# Patient Record
Sex: MALE | Race: WHITE | NOT HISPANIC OR LATINO | Employment: PART TIME | URBAN - METROPOLITAN AREA
[De-identification: names, ages, dates, MRNs, and addresses within clinical notes are randomized per-mention and may not be internally consistent; named-entity substitution may affect disease eponyms.]

---

## 2017-07-07 DIAGNOSIS — M1A.0710 IDIOPATHIC CHRONIC GOUT OF RIGHT FOOT WITHOUT TOPHUS: ICD-10-CM

## 2017-07-07 NOTE — LETTER
[facility]  [address] [place] [address]  [place] [address] [phone]          July 12, 2017    Thien Zarate                                                                                                                     [address] [facility] [address]            Dear Thien,    We have tried to contact you, but have not been able to connect with you.    We were calling to let you know that we received a refill request for a medication.    We were able to send in a supply to your pharmacy, but the provider noted that you will need to be seen for a lab appointment in order to continue the medication.    Please call us at [phone] if you have any questions or to schedule an appointment.    Thank you        Sincerely,         Frankie Kimbrough MD

## 2017-07-10 RX ORDER — INDOMETHACIN 25 MG/1
CAPSULE ORAL
Qty: 30 CAPSULE | Refills: 0 | Status: SHIPPED | OUTPATIENT
Start: 2017-07-10 | End: 2019-09-04

## 2017-07-10 NOTE — TELEPHONE ENCOUNTER
Disp Refills Start End HEMALATHA     indomethacin (INDOCIN) 25 MG capsule 30 capsule 0 10/25/2016  No     Sig: TAKE 2 CAPSULES BY MOUTH THREE TIMES DAILY AS NEEDED WITH MEALS     Class: E-Prescribe     Order: [id number]     Last Office Visit with [facility] or [facility] prescribing provider:  9/1/16        Uric Acid   Date Value Ref Range Status   06/16/2015 9.6 (H) 3.5 - 7.2 mg/dL Final   ]  Creatinine   Date Value Ref Range Status   09/01/2016 0.80 0.66 - 1.25 mg/dL Final   ]  Lab Results   Component Value Date    WBC 8.3 06/16/2015     Lab Results   Component Value Date    RBC 4.51 06/16/2015     Lab Results   Component Value Date    HGB 14.1 06/16/2015     Lab Results   Component Value Date    HCT 40.9 06/16/2015     No components found for: MCT  Lab Results   Component Value Date    MCV 91 06/16/2015     Lab Results   Component Value Date    MCH 31.3 06/16/2015     Lab Results   Component Value Date    MCHC 34.5 06/16/2015     Lab Results   Component Value Date    RDW 12.8 06/16/2015     Lab Results   Component Value Date     06/16/2015     Lab Results   Component Value Date    AST 28 02/15/2013     Lab Results   Component Value Date    ALT 47 02/15/2013        Routing refill request to provider for review/approval because:  Labs not current:  Uric Acid, CBC.   Laura Coello RN

## 2017-09-02 ENCOUNTER — TELEPHONE (OUTPATIENT)
Dept: FAMILY MEDICINE | Facility: CLINIC | Age: 57
End: 2017-09-02

## 2017-09-02 DIAGNOSIS — I10 ESSENTIAL HYPERTENSION WITH GOAL BLOOD PRESSURE LESS THAN 140/90: ICD-10-CM

## 2017-09-04 DIAGNOSIS — I10 ESSENTIAL HYPERTENSION WITH GOAL BLOOD PRESSURE LESS THAN 140/90: ICD-10-CM

## 2017-09-05 RX ORDER — AMLODIPINE BESYLATE 10 MG/1
TABLET ORAL
Qty: 90 TABLET | Refills: 0 | Status: SHIPPED | OUTPATIENT
Start: 2017-09-05 | End: 2017-12-04

## 2017-09-05 RX ORDER — METOPROLOL SUCCINATE 50 MG/1
TABLET, EXTENDED RELEASE ORAL
Qty: 270 TABLET | Refills: 0 | Status: SHIPPED | OUTPATIENT
Start: 2017-09-05 | End: 2017-09-14

## 2017-09-05 NOTE — TELEPHONE ENCOUNTER
amLODIPine (NORVASC) 10 MG tablet      Last Written Prescription Date: 9/1/16  Last Fill Quantity: 90, # refills: 3    Last Office Visit with [facility], [facility] or [facility] prescribing provider:  9/1/16   Future Office Visit:        BP Readings from Last 3 Encounters:   09/01/16 138/87   06/16/15 110/70   06/08/15 151/90

## 2017-09-05 NOTE — TELEPHONE ENCOUNTER
Routing refill request to provider for review/approval because:  Patient needs to be seen because it has been more than 1 year since last office visit. Please Advise . Elisha Mandel RN-BSN

## 2017-09-05 NOTE — TELEPHONE ENCOUNTER
metoprolol (TOPROL-XL) 50 MG 24 hr tablet      Last Written Prescription Date: 9/1/16  Last Fill Quantity: 270, # refills: 3    Last Office Visit with [facility], [facility] or [facility] prescribing provider:  9/1/16   Future Office Visit:        BP Readings from Last 3 Encounters:   09/01/16 138/87   06/16/15 110/70   06/08/15 151/90

## 2017-09-05 NOTE — TELEPHONE ENCOUNTER
Medication is being filled for 1 time refill only due to:  Patient needs to be seen because it has been more than one year since last visit.     RN sees another refill encounter for TC to call and set up appt.  Closing this one.    Asiya Mata RN  [facility]

## 2017-09-06 NOTE — TELEPHONE ENCOUNTER
Message left on home number for patient to call back TC line.  NTBS for HTN check with PCP.    Gela PADILLA

## 2017-09-14 ENCOUNTER — OFFICE VISIT (OUTPATIENT)
Dept: FAMILY MEDICINE | Facility: CLINIC | Age: 57
End: 2017-09-14
Payer: COMMERCIAL

## 2017-09-14 VITALS
SYSTOLIC BLOOD PRESSURE: 138 MMHG | TEMPERATURE: 97.4 F | DIASTOLIC BLOOD PRESSURE: 100 MMHG | BODY MASS INDEX: 34.91 KG/M2 | WEIGHT: 272 LBS | HEIGHT: 74 IN | OXYGEN SATURATION: 96 % | HEART RATE: 84 BPM

## 2017-09-14 DIAGNOSIS — E87.6 HYPOKALEMIA: ICD-10-CM

## 2017-09-14 DIAGNOSIS — Z00.01 ENCOUNTER FOR GENERAL ADULT MEDICAL EXAMINATION WITH ABNORMAL FINDINGS: Primary | ICD-10-CM

## 2017-09-14 DIAGNOSIS — I10 ESSENTIAL HYPERTENSION WITH GOAL BLOOD PRESSURE LESS THAN 140/90: ICD-10-CM

## 2017-09-14 DIAGNOSIS — E66.09 NON MORBID OBESITY DUE TO EXCESS CALORIES: ICD-10-CM

## 2017-09-14 DIAGNOSIS — I10 HYPERTENSION GOAL BP (BLOOD PRESSURE) < 140/90: ICD-10-CM

## 2017-09-14 DIAGNOSIS — E78.5 HYPERLIPIDEMIA LDL GOAL <130: ICD-10-CM

## 2017-09-14 DIAGNOSIS — R61 GENERALIZED HYPERHIDROSIS: ICD-10-CM

## 2017-09-14 LAB
BASOPHILS # BLD AUTO: 0 10E9/L (ref 0–0.2)
BASOPHILS NFR BLD AUTO: 0.6 %
DIFFERENTIAL METHOD BLD: NORMAL
EOSINOPHIL # BLD AUTO: 0.1 10E9/L (ref 0–0.7)
EOSINOPHIL NFR BLD AUTO: 2.6 %
ERYTHROCYTE [DISTWIDTH] IN BLOOD BY AUTOMATED COUNT: 12.8 % (ref 10–15)
HCT VFR BLD AUTO: 41.8 % (ref 40–53)
HGB BLD-MCNC: 14.7 G/DL (ref 13.3–17.7)
LYMPHOCYTES # BLD AUTO: 1.8 10E9/L (ref 0.8–5.3)
LYMPHOCYTES NFR BLD AUTO: 33 %
MCH RBC QN AUTO: 32 PG (ref 26.5–33)
MCHC RBC AUTO-ENTMCNC: 35.2 G/DL (ref 31.5–36.5)
MCV RBC AUTO: 91 FL (ref 78–100)
MONOCYTES # BLD AUTO: 0.6 10E9/L (ref 0–1.3)
MONOCYTES NFR BLD AUTO: 11.2 %
NEUTROPHILS # BLD AUTO: 2.9 10E9/L (ref 1.6–8.3)
NEUTROPHILS NFR BLD AUTO: 52.6 %
PLATELET # BLD AUTO: 222 10E9/L (ref 150–450)
RBC # BLD AUTO: 4.6 10E12/L (ref 4.4–5.9)
WBC # BLD AUTO: 5.5 10E9/L (ref 4–11)

## 2017-09-14 PROCEDURE — 99396 PREV VISIT EST AGE 40-64: CPT | Performed by: FAMILY MEDICINE

## 2017-09-14 PROCEDURE — 85025 COMPLETE CBC W/AUTO DIFF WBC: CPT | Performed by: FAMILY MEDICINE

## 2017-09-14 PROCEDURE — 80048 BASIC METABOLIC PNL TOTAL CA: CPT | Performed by: FAMILY MEDICINE

## 2017-09-14 PROCEDURE — 84443 ASSAY THYROID STIM HORMONE: CPT | Performed by: FAMILY MEDICINE

## 2017-09-14 PROCEDURE — 36415 COLL VENOUS BLD VENIPUNCTURE: CPT | Performed by: FAMILY MEDICINE

## 2017-09-14 RX ORDER — METOPROLOL SUCCINATE 100 MG/1
100 TABLET, EXTENDED RELEASE ORAL 2 TIMES DAILY
Qty: 30 TABLET | Refills: 1 | Status: SHIPPED | OUTPATIENT
Start: 2017-09-14 | End: 2018-10-29

## 2017-09-14 RX ORDER — METOPROLOL SUCCINATE 100 MG/1
100 TABLET, EXTENDED RELEASE ORAL DAILY
Qty: 30 TABLET | Refills: 1 | COMMUNITY
Start: 2017-09-14 | End: 2017-09-14

## 2017-09-14 NOTE — MR AVS SNAPSHOT
After Visit Summary   9/14/2017    Thien Zarate    MRN: [id number]           Patient Information     Date Of Birth          1960        Visit Information        Provider Department      9/14/2017 5:20 PM Frankie Kimbrough MD [facility]        Today's Diagnoses     Encounter for general adult medical examination with abnormal findings    -  1    Hypertension goal BP (blood pressure) < 140/90        Hyperlipidemia LDL goal <130        Non morbid obesity due to excess calories        Essential hypertension with goal blood pressure less than 140/90        Generalized hyperhidrosis          Care Instructions      Preventive Health Recommendations  Male Ages 50 - 64    Yearly exam:             See your health care provider every year in order to  o   Review health changes.   o   Discuss preventive care.    o   Review your medicines if your doctor has prescribed any.     Have a cholesterol test every 5 years, or more frequently if you are at risk for high cholesterol/heart disease.     Have a diabetes test (fasting glucose) every three years. If you are at risk for diabetes, you should have this test more often.     Have a colonoscopy at age 50, or have a yearly FIT test (stool test). These exams will check for colon cancer.      Talk with your health care provider about whether or not a prostate cancer screening test (PSA) is right for you.    You should be tested each year for STDs (sexually transmitted diseases), if you re at risk.     Shots: Get a flu shot each year. Get a tetanus shot every 10 years.     Nutrition:    Eat at least 5 servings of fruits and vegetables daily.     Eat whole-grain bread, whole-wheat pasta and brown rice instead of white grains and rice.     Talk to your provider about Calcium and Vitamin D.     Lifestyle    Exercise for at least 150 minutes a week (30 minutes a day, 5 days a week). This will help you control your weight and prevent disease.  "    Limit alcohol to one drink per day.     No smoking.     Wear sunscreen to prevent skin cancer.     See your dentist every six months for an exam and cleaning.     See your eye doctor every 1 to 2 years.            Follow-ups after your visit        Who to contact     If you have questions or need follow up information about today's clinic visit or your schedule please contact [facility] directly at [phone].  Normal or non-critical lab and imaging results will be communicated to you by [employer], letter or phone within 4 business days after the clinic has received the results. If you do not hear from us within 7 days, please contact the clinic through [employer] or phone. If you have a critical or abnormal lab result, we will notify you by phone as soon as possible.  Submit refill requests through [employer] or call your pharmacy and they will forward the refill request to us. Please allow 3 business days for your refill to be completed.          Additional Information About Your Visit        [employer] Information     [employer] gives you secure access to your electronic health record. If you see a primary care provider, you can also send messages to your care team and make appointments. If you have questions, please call your primary care clinic.  If you do not have a primary care provider, please call [phone] and they will assist you.        Care EveryWhere ID     This is your Care EveryWhere ID. This could be used by other organizations to access your [facility] records  [id number]        Your Vitals Were     Pulse Temperature Height Pulse Oximetry BMI (Body Mass Index)       84 97.4  F (36.3  C) (Oral) 6' 1.5\" (1.867 m) 96% 35.4 kg/m2        Blood Pressure from Last 3 Encounters:   09/14/17 (!) 138/100   09/01/16 138/87   06/16/15 110/70    Weight from Last 3 Encounters:   09/14/17 272 lb (123.4 kg)   09/01/16 266 lb (120.7 kg)   06/16/15 279 lb 12.8 oz (126.9 kg)              We " Performed the Following     Basic metabolic panel     CBC with platelets differential     TSH with free T4 reflex          Today's Medication Changes          These changes are accurate as of: 9/14/17  5:51 PM.  If you have any questions, ask your nurse or doctor.               Start taking these medicines.        Dose/Directions    metoprolol 100 MG 24 hr tablet   Commonly known as:  TOPROL-XL   Used for:  Essential hypertension with goal blood pressure less than 140/90   Started by:  Frankie Kimbrough MD        Dose:  100 mg   Take 1 tablet (100 mg) by mouth 2 times daily   Quantity:  30 tablet   Refills:  1            Where to get your medicines      These medications were sent to [id number] [place], [place] [address] [facility] [address] [place] [address]  [address] [facility] [address], [place] [address]     Phone:  [phone]     metoprolol 100 MG 24 hr tablet                Primary Care Provider Office Phone # Fax #    Frankie Kimbrough [phone]       [address] [facility] [address]        Equal Access to Services     [facility]: Hadii aad ku hadasho Soomaali, waaxda luqadaha, qaybta kaalmada adeegyada, maximo day . So [facility] [phone].    ATENCIÓN: Si habla español, tiene a gray disposición servicios gratuitos de asistencia lingüística. Llame al [phone].    We comply with applicable federal civil rights laws and [place] laws. We do not discriminate on the basis of race, color, national origin, age, disability sex, sexual orientation or gender identity.            Thank you!     Thank you for choosing [facility]  for your care. Our goal is always to provide you with excellent care. Hearing back from our patients is one way we can continue to improve our services. Please take a few minutes to complete the written survey that you may receive in the mail after your visit with us. Thank you!              Your Updated Medication List - Protect others around you: Learn how to safely use, store and throw away your medicines at www.disposemymeds.org.          This list is accurate as of: 9/14/17  5:51 PM.  Always use your most recent med list.                   Brand Name Dispense Instructions for use Diagnosis    amLODIPine 10 MG tablet    NORVASC    90 tablet    TAKE 1 TABLET(10 MG) BY MOUTH DAILY    Essential hypertension with goal blood pressure less than 140/90       FISH OIL PO      Take 1,000 mg by mouth 3 TABLETS DAILY        indomethacin 25 MG capsule    INDOCIN    30 capsule    TAKE 2 CAPSULE BY MOUTH TWICE DAILY AS NEEDED WITH MEALS    Idiopathic chronic gout of right foot without tophus       lisinopril-hydrochlorothiazide 20-12.5 MG per tablet    PRINZIDE/ZESTORETIC    180 tablet    Take 2 tablets by mouth daily    Essential hypertension with goal blood pressure less than 140/90       metoprolol 100 MG 24 hr tablet    TOPROL-XL    30 tablet    Take 1 tablet (100 mg) by mouth 2 times daily    Essential hypertension with goal blood pressure less than 140/90       TYLENOL 8 HOUR PO      Take  by mouth. As needed

## 2017-09-14 NOTE — NURSING NOTE
"Chief Complaint   Patient presents with     Physical     Hypertension       Initial BP (!) 142/93  Pulse 84  Temp 97.4  F (36.3  C) (Oral)  Ht 6' 1.5\" (1.867 m)  Wt 272 lb (123.4 kg)  SpO2 96%  BMI 35.4 kg/m2 Estimated body mass index is 35.4 kg/(m^2) as calculated from the following:    Height as of this encounter: 6' 1.5\" (1.867 m).    Weight as of this encounter: 272 lb (123.4 kg).  Medication Reconciliation: complete   Desiree Carolina MA      "

## 2017-09-14 NOTE — PROGRESS NOTES
SUBJECTIVE:   CC: Thien Zarate is an 57 year old male who presents for preventative health visit.     Physical   Annual:     Getting at least 3 servings of Calcium per day::  NO    Bi-annual eye exam::  NO    Dental care twice a year::  NO    Sleep apnea or symptoms of sleep apnea::  None    Taking medications regularly::  Yes    Medication side effects::  Not applicable    Additional concerns today::  No  Hypertension   Taking medications regularly::  Yes  Medication side effects::  Not applicable  Additional concerns today::  No      Blood Pressure follow up    Today's PHQ-2 Score: PHQ-2 ( 1999 [employer]) 9/14/2017   Q1: Little interest or pleasure in doing things 0   Q2: Feeling down, depressed or hopeless 0   PHQ-2 Score 0   Q1: Little interest or pleasure in doing things Not at all   Q2: Feeling down, depressed or hopeless Not at all   PHQ-2 Score 0       Abuse: Current or Past(Physical, Sexual or Emotional)- No  Do you feel safe in your environment - Yes    Social History   Substance Use Topics     Smoking status: Former Smoker     Years: 30.00     Types: Cigars     Quit date: 6/3/1979     Smokeless tobacco: Never Used     Alcohol use Yes      Comment: 12pk a week last use two days ago     The patient does not drink >3 drinks per day nor >7 drinks per week.    Last PSA: No results found for: PSA    Reviewed orders with patient. Reviewed health maintenance and updated orders accordingly - Yes  BP Readings from Last 3 Encounters:   09/14/17 (!) 142/93   09/01/16 138/87   06/16/15 110/70    Wt Readings from Last 3 Encounters:   09/14/17 272 lb (123.4 kg)   09/01/16 266 lb (120.7 kg)   06/16/15 279 lb 12.8 oz (126.9 kg)                  Patient Active Problem List   Diagnosis     Hypertension goal BP (blood pressure) < 140/90     Obesity     Hyperlipidemia LDL goal <130     Advanced directives, counseling/discussion     Past Surgical History:   Procedure Laterality Date     DISCECTOMY LUMBAR POSTERIOR MICROSCOPIC  ONE LEVEL Left 3/24/2015    Procedure: DISCECTOMY LUMBAR POSTERIOR MICROSCOPIC ONE LEVEL;  Surgeon: Cricket Stone MD;  Location: [facility] OR     HC SACROPLASTY      and also laminectomy, unsure what level     Left MMT arthroscopy  2012     ORTHOPEDIC SURGERY  12/2011    Rt knee     ORTHOPEDIC SURGERY  3/9/2012    Lt knee     Rt MMT arthroscopy  2011       Social History   Substance Use Topics     Smoking status: Former Smoker     Years: 30.00     Types: Cigars     Quit date: 6/3/1979     Smokeless tobacco: Never Used     Alcohol use Yes      Comment: 12pk a week last use two days ago     Family History   Problem Relation Age of Onset     Cardiovascular Father      HEART DISEASE Sister          Current Outpatient Prescriptions   Medication Sig Dispense Refill     amLODIPine (NORVASC) 10 MG tablet TAKE 1 TABLET(10 MG) BY MOUTH DAILY 90 tablet 0     metoprolol (TOPROL-XL) 50 MG 24 hr tablet TAKE 3 TABLETS(150 MG) BY MOUTH DAILY 270 tablet 0     indomethacin (INDOCIN) 25 MG capsule TAKE 2 CAPSULE BY MOUTH TWICE DAILY AS NEEDED WITH MEALS 30 capsule 0     lisinopril-hydrochlorothiazide (PRINZIDE,ZESTORETIC) 20-12.5 MG per tablet Take 2 tablets by mouth daily 180 tablet 3     Acetaminophen (TYLENOL 8 HOUR PO) Take  by mouth. As needed       Omega-3 Fatty Acids (FISH OIL PO) Take 1,000 mg by mouth 3 TABLETS DAILY       Allergies   Allergen Reactions     Penicillins Unknown           Reviewed and updated as needed this visit by clinical staff         Reviewed and updated as needed this visit by Provider        Past Medical History:   Diagnosis Date     Arthritis      Gout      Hypertension       Past Surgical History:   Procedure Laterality Date     DISCECTOMY LUMBAR POSTERIOR MICROSCOPIC ONE LEVEL Left 3/24/2015    Procedure: DISCECTOMY LUMBAR POSTERIOR MICROSCOPIC ONE LEVEL;  Surgeon: Cricket Stone MD;  Location: [facility] OR     HC SACROPLASTY      and also laminectomy, unsure what level     Left MMT arthroscopy  2012      "ORTHOPEDIC SURGERY  12/2011    Rt knee     ORTHOPEDIC SURGERY  3/9/2012    Lt knee     Rt MMT arthroscopy  2011       ROS:  C: NEGATIVE for fever, chills, change in weight  I: NEGATIVE for worrisome rashes, moles or lesions  E: NEGATIVE for vision changes or irritation  ENT: NEGATIVE for ear, mouth and throat problems    Gets allergies at this time of the year     R: NEGATIVE for significant cough or SOB    CV: NEGATIVE for chest pain, palpitations or peripheral edema  GI: NEGATIVE for nausea, abdominal pain, heartburn, or change in bowel habits   male: negative for dysuria, hematuria, decreased urinary stream, erectile dysfunction, urethral discharge  M: NEGATIVE for significant arthralgias or myalgia  N: NEGATIVE for weakness, dizziness or paresthesias  P: NEGATIVE for changes in mood or affect    OBJECTIVE:   BP (!) 142/93  Pulse 84  Temp 97.4  F (36.3  C) (Oral)  Ht 6' 1.5\" (1.867 m)  Wt 272 lb (123.4 kg)  SpO2 96%  BMI 35.4 kg/m2    EXAM:  GENERAL: healthy, alert and no distress  EYES: Eyes grossly normal to inspection, PERRL and conjunctivae and sclerae normal  HENT: ear canals and TM's normal, nose and mouth without ulcers or lesions  NECK: no adenopathy, no asymmetry, masses, or scars and thyroid normal to palpation  RESP: lungs clear to auscultation - no rales, rhonchi or wheezes  CV: regular rate and rhythm, normal S1 S2, no S3 or S4, no murmur, click or rub, no peripheral edema and peripheral pulses strong  ABDOMEN: soft, nontender, no hepatosplenomegaly, no masses and bowel sounds normal  MS: no gross musculoskeletal defects noted, no edema  SKIN: no suspicious lesions or rashes  NEURO: Normal strength and tone, mentation intact and speech normal  PSYCH: mentation appears normal, affect normal/bright    ASSESSMENT/PLAN:       ICD-10-CM    1. Encounter for general adult medical examination with abnormal findings Z00.01    2. Hypertension goal BP (blood pressure) < 140/90 I10    3. Hyperlipidemia " "LDL goal <130 E78.5    4. Non morbid obesity due to excess calories E66.09        COUNSELING:   Reviewed preventive health counseling, as reflected in patient instructions       Regular exercise       Healthy diet/nutrition       Vision screening           reports that he quit smoking about 38 years ago. His smoking use included Cigars. He quit after 30.00 years of use. He has never used smokeless tobacco.      Estimated body mass index is 35.09 kg/(m^2) as calculated from the following:    Height as of 9/1/16: 6' 1\" (1.854 m).    Weight as of 9/1/16: 266 lb (120.7 kg).   Weight management plan: Discussed healthy diet and exercise guidelines and patient will follow up in 12 months in clinic to re-evaluate.    Counseling Resources:  ATP IV Guidelines  Pooled Cohorts Equation Calculator  FRAX Risk Assessment  ICSI Preventive Guidelines  Dietary Guidelines for Americans, 2010  USDA's MyPlate  ASA Prophylaxis  Lung CA Screening    Frankie Kimbrough MD  [facility]  "

## 2017-09-15 LAB
ANION GAP SERPL CALCULATED.3IONS-SCNC: 11 MMOL/L (ref 3–14)
BUN SERPL-MCNC: 15 MG/DL (ref 7–30)
CALCIUM SERPL-MCNC: 9.7 MG/DL (ref 8.5–10.1)
CHLORIDE SERPL-SCNC: 104 MMOL/L (ref 94–109)
CO2 SERPL-SCNC: 27 MMOL/L (ref 20–32)
CREAT SERPL-MCNC: 0.66 MG/DL (ref 0.66–1.25)
GFR SERPL CREATININE-BSD FRML MDRD: >90 ML/MIN/1.7M2
GLUCOSE SERPL-MCNC: 101 MG/DL (ref 70–99)
POTASSIUM SERPL-SCNC: 3.3 MMOL/L (ref 3.4–5.3)
SODIUM SERPL-SCNC: 142 MMOL/L (ref 133–144)
TSH SERPL DL<=0.005 MIU/L-ACNC: 2.22 MU/L (ref 0.4–4)

## 2017-09-15 RX ORDER — POTASSIUM CHLORIDE 1500 MG/1
20 TABLET, EXTENDED RELEASE ORAL DAILY
Qty: 30 TABLET | Refills: 0 | Status: SHIPPED | OUTPATIENT
Start: 2017-09-15 | End: 2018-10-29

## 2017-09-15 NOTE — PROGRESS NOTES
Your basic metabolic panel which includes electrolytes,kidney function is normal and  -Glucose (diabetic screening test) is within normal limits.  Your potassium was a little low.  We will nee you to takes some potassium tablets.  I have called this in for you.   Your thyroid is normal.       Follow up in 1 month(s)

## 2017-10-02 DIAGNOSIS — I10 ESSENTIAL HYPERTENSION WITH GOAL BLOOD PRESSURE LESS THAN 140/90: ICD-10-CM

## 2017-10-02 RX ORDER — LISINOPRIL AND HYDROCHLOROTHIAZIDE 12.5; 2 MG/1; MG/1
TABLET ORAL
Qty: 180 TABLET | Refills: 0 | Status: SHIPPED | OUTPATIENT
Start: 2017-10-02 | End: 2018-01-02

## 2017-10-02 NOTE — TELEPHONE ENCOUNTER
lisinopril-hydrochlorothiazide (PRINZIDE,ZESTORETIC) 20-12.5 MG per tablet      Last Written Prescription Date: 9-1-16  Last Fill Quantity: 180, # refills: 3  Last Office Visit with G, P or [facility] prescribing provider: 9-14-17  Next 5 appointments (look out 90 days)     Oct 12, 2017  5:20 PM CDT   SHORT with Frankie Kimbrough MD   [facility]    [address] [place] [id number] [phone]                   Potassium   Date Value Ref Range Status   09/14/2017 3.3 (L) 3.4 - 5.3 mmol/L Final     Creatinine   Date Value Ref Range Status   09/14/2017 0.66 0.66 - 1.25 mg/dL Final     BP Readings from Last 3 Encounters:   09/14/17 (!) 138/100   09/01/16 138/87   06/16/15 110/70

## 2017-10-02 NOTE — TELEPHONE ENCOUNTER
Routing refill request to provider for review/approval because:  Labs out of range  BP was elevated.    Yamile Perez RN [facility] Triage.

## 2017-11-02 ENCOUNTER — OFFICE VISIT (OUTPATIENT)
Dept: FAMILY MEDICINE | Facility: CLINIC | Age: 57
End: 2017-11-02
Payer: COMMERCIAL

## 2017-11-02 VITALS
TEMPERATURE: 97.6 F | WEIGHT: 273 LBS | SYSTOLIC BLOOD PRESSURE: 128 MMHG | HEART RATE: 68 BPM | DIASTOLIC BLOOD PRESSURE: 80 MMHG | BODY MASS INDEX: 35.53 KG/M2 | OXYGEN SATURATION: 97 %

## 2017-11-02 DIAGNOSIS — E66.01 MORBID OBESITY (H): ICD-10-CM

## 2017-11-02 DIAGNOSIS — H00.014 HORDEOLUM EXTERNUM OF LEFT UPPER EYELID: Primary | ICD-10-CM

## 2017-11-02 DIAGNOSIS — I10 HYPERTENSION GOAL BP (BLOOD PRESSURE) < 140/90: ICD-10-CM

## 2017-11-02 PROCEDURE — 99213 OFFICE O/P EST LOW 20 MIN: CPT | Performed by: NURSE PRACTITIONER

## 2017-11-02 RX ORDER — BACITRACIN 500 [USP'U]/G
1 OINTMENT OPHTHALMIC 4 TIMES DAILY
Qty: 3.5 G | Refills: 1 | Status: SHIPPED | OUTPATIENT
Start: 2017-11-02 | End: 2017-11-09

## 2017-11-02 ASSESSMENT — PAIN SCALES - GENERAL: PAINLEVEL: NO PAIN (0)

## 2017-11-02 NOTE — PATIENT INSTRUCTIONS
Sty (or Stye)  A sty is an infection of the oil gland of the eyelid. It may develop into a small pocket of pus (an abscess). This can cause pain, redness, and swelling. In early stages, a sty is treated with antibiotic cream, eye drops, or a small towel soaked in warm water (a warm compress). More severe cases may need to be opened and drained by a healthcare provider.  Home care    Eye drops or ointment are usually prescribed to treat the infection. Use these as directed.     Artificial tears may also be used to lubricate the eye and make it more comfortable. You can buy these over the counter without a prescription. Talk with your healthcare provider before using any over-the-counter treatment for a sty.    Apply a warm, damp towel to the affected eye for at least 5 minutes, 3 to 4 times a day for a week. Warm compresses open the pores and speed the healing. But if the compresses are too hot, they may burn your eyelid.    Sometimes the sty will drain with this treatment alone. If this happens, keep using the antibiotic until all the redness and swelling are gone.    Wash your hands before and after touching the infected eyelid to avoid spreading the infection.    Don t squeeze or try to break open the sty.  Follow-up care  Follow up with your healthcare provider, or as advised.   When to seek medical advice  Call your healthcare provider right away if any of these occur:    Increase in swelling or redness around the eyelid after 48 to 72 hours    Increase in eye pain or the eyelid blisters    Increase in warmth--the eyelid feels hot    Drainage of blood or thick pus from the sty    Blister on the eyelid    Inability to open the eyelid due to swelling    Fever of 100.4 F (38 C) or above, or as directed by your provider    Vision changes    Headache or stiff neck    The sty comes back  Date Last Reviewed: 8/1/2017 2000-2017 [employer]. [address] [place] [address], [place] [address]. All rights  reserved. This information is not intended as a substitute for professional medical care. Always follow your healthcare professional's instructions.

## 2017-11-02 NOTE — PROGRESS NOTES
SUBJECTIVE:   Thien Zarate is a 57 year old male who presents to clinic today for the following health issues:      Eye(s) Problem  Onset: 4 days    Description:   Location: left  Pain: no   Redness: YES    Accompanying Signs & Symptoms:  Discharge/mattering: no  Swelling: YES  Visual changes: no  Fever: no  Nasal Congestion: no  Bothered by bright lights: no    History:   Trauma: no   Foreign body exposure: no    Precipitating factors:   Wearing contacts: no    Alleviating factors:  Improved by: nothing    Therapies Tried and outcome:     Worsening past 4 days  No vision change  No drainage  No fevers    HTN  Compliance with medications    Problem list and histories reviewed & adjusted, as indicated.  Additional history: none    Patient Active Problem List   Diagnosis     Hypertension goal BP (blood pressure) < 140/90     Hyperlipidemia LDL goal <130     Advanced directives, counseling/discussion     Non morbid obesity due to excess calories     Morbid obesity (H)     Past Surgical History:   Procedure Laterality Date     DISCECTOMY LUMBAR POSTERIOR MICROSCOPIC ONE LEVEL Left 3/24/2015    Procedure: DISCECTOMY LUMBAR POSTERIOR MICROSCOPIC ONE LEVEL;  Surgeon: Cricket Stone MD;  Location: [facility]     HC SACROPLASTY      and also laminectomy, unsure what level     Left MMT arthroscopy  2012     ORTHOPEDIC SURGERY  12/2011    Rt knee     ORTHOPEDIC SURGERY  3/9/2012    Lt knee     Rt MMT arthroscopy  2011       Social History   Substance Use Topics     Smoking status: Former Smoker     Years: 30.00     Types: Cigars     Quit date: 6/3/1979     Smokeless tobacco: Never Used     Alcohol use Yes      Comment: 12pk a week last use two days ago     Family History   Problem Relation Age of Onset     Cardiovascular Father      HEART DISEASE Sister          BP Readings from Last 3 Encounters:   11/02/17 128/80   09/14/17 (!) 138/100   09/01/16 138/87    Wt Readings from Last 3 Encounters:   11/02/17 273 lb (123.8 kg)    09/14/17 272 lb (123.4 kg)   09/01/16 266 lb (120.7 kg)                        Reviewed and updated as needed this visit by clinical staffTobacco  Allergies  Meds  Med Hx  Surg Hx  Fam Hx  Soc Hx      Reviewed and updated as needed this visit by Provider         ROS:  Constitutional, HEENT, cardiovascular, pulmonary, gi and gu systems are negative, except as otherwise noted.      OBJECTIVE:   /80  Pulse 68  Temp 97.6  F (36.4  C) (Oral)  Wt 273 lb (123.8 kg)  SpO2 97%  BMI 35.53 kg/m2  Body mass index is 35.53 kg/(m^2).  GENERAL: healthy, alert and no distress  EYES: hordeolum left upper inner eyelid with surrounding erythema and swelling. No warmth. Visual fields intact  CV: regular rate and rhythm, normal S1 S2, no S3 or S4, no murmur, click or rub    Diagnostic Test Results:  none     ASSESSMENT/PLAN:   1. Hordeolum externum of left upper eyelid  - Reviewed self cares and handout given  - bacitracin ophthalmic ointment; Apply 1 Application to eye 4 times daily for 7 days  Dispense: 3.5 g; Refill: 1    2. Hypertension goal BP (blood pressure) < 140/90  - Improved upon recheck. Continue with current medications    3. Morbid obesity (H)  - Advised weight loss and exercise to help with BP control      See Patient Instructions    ABBI Lagunas [facility]

## 2017-11-02 NOTE — NURSING NOTE
"Chief Complaint   Patient presents with     Eye Problem       Initial BP (!) 149/93 (BP Location: Right arm, Patient Position: Chair, Cuff Size: Adult Large)  Pulse 68  Temp 97.6  F (36.4  C) (Oral)  Wt 273 lb (123.8 kg)  SpO2 97%  BMI 35.53 kg/m2 Estimated body mass index is 35.53 kg/(m^2) as calculated from the following:    Height as of 9/14/17: 6' 1.5\" (1.867 m).    Weight as of this encounter: 273 lb (123.8 kg).  Medication Reconciliation: complete.  JOSE Coronado MA      "

## 2017-11-02 NOTE — MR AVS SNAPSHOT
After Visit Summary   11/2/2017    Thien Zarate    MRN: [id number]           Patient Information     Date Of Birth          1960        Visit Information        Provider Department      11/2/2017 9:40 AM Fabiola Caceres APRN [facility]        Today's Diagnoses     Hypertension goal BP (blood pressure) < 140/90    -  1    Hordeolum externum of left upper eyelid          Care Instructions      Sty (or Stye)  A sty is an infection of the oil gland of the eyelid. It may develop into a small pocket of pus (an abscess). This can cause pain, redness, and swelling. In early stages, a sty is treated with antibiotic cream, eye drops, or a small towel soaked in warm water (a warm compress). More severe cases may need to be opened and drained by a healthcare provider.  Home care    Eye drops or ointment are usually prescribed to treat the infection. Use these as directed.     Artificial tears may also be used to lubricate the eye and make it more comfortable. You can buy these over the counter without a prescription. Talk with your healthcare provider before using any over-the-counter treatment for a sty.    Apply a warm, damp towel to the affected eye for at least 5 minutes, 3 to 4 times a day for a week. Warm compresses open the pores and speed the healing. But if the compresses are too hot, they may burn your eyelid.    Sometimes the sty will drain with this treatment alone. If this happens, keep using the antibiotic until all the redness and swelling are gone.    Wash your hands before and after touching the infected eyelid to avoid spreading the infection.    Don t squeeze or try to break open the sty.  Follow-up care  Follow up with your healthcare provider, or as advised.   When to seek medical advice  Call your healthcare provider right away if any of these occur:    Increase in swelling or redness around the eyelid after 48 to 72 hours    Increase in eye pain or  the eyelid blisters    Increase in warmth--the eyelid feels hot    Drainage of blood or thick pus from the sty    Blister on the eyelid    Inability to open the eyelid due to swelling    Fever of 100.4 F (38 C) or above, or as directed by your provider    Vision changes    Headache or stiff neck    The sty comes back  Date Last Reviewed: 8/1/2017 2000-2017 [employer]. [address] [place] [address]. All rights reserved. This information is not intended as a substitute for professional medical care. Always follow your healthcare professional's instructions.                Follow-ups after your visit        Who to contact     If you have questions or need follow up information about today's clinic visit or your schedule please contact [facility] directly at [phone].  Normal or non-critical lab and imaging results will be communicated to you by MyChart, letter or phone within 4 business days after the clinic has received the results. If you do not hear from us within 7 days, please contact the clinic through [employer] or phone. If you have a critical or abnormal lab result, we will notify you by phone as soon as possible.  Submit refill requests through [employer] or call your pharmacy and they will forward the refill request to us. Please allow 3 business days for your refill to be completed.          Additional Information About Your Visit        [employer] Information     [employer] gives you secure access to your electronic health record. If you see a primary care provider, you can also send messages to your care team and make appointments. If you have questions, please call your primary care clinic.  If you do not have a primary care provider, please call [phone] and they will assist you.        Care EveryWhere ID     This is your Care EveryWhere ID. This could be used by other organizations to access your [place] medical records  [id number]        Your Vitals  Were     Pulse Temperature Pulse Oximetry BMI (Body Mass Index)          68 97.6  F (36.4  C) (Oral) 97% 35.53 kg/m2         Blood Pressure from Last 3 Encounters:   11/02/17 128/80   09/14/17 (!) 138/100   09/01/16 138/87    Weight from Last 3 Encounters:   11/02/17 273 lb (123.8 kg)   09/14/17 272 lb (123.4 kg)   09/01/16 266 lb (120.7 kg)              Today, you had the following     No orders found for display         Today's Medication Changes          These changes are accurate as of: 11/2/17 10:12 AM.  If you have any questions, ask your nurse or doctor.               Start taking these medicines.        Dose/Directions    bacitracin ophthalmic ointment   Used for:  Hordeolum externum of left upper eyelid   Started by:  Fabiola Caceres APRN CNP        Dose:  1 Application   Apply 1 Application to eye 4 times daily for 7 days   Quantity:  3.5 g   Refills:  1            Where to get your medicines      These medications were sent to [facility] [address]  [address] [facility] [address]     Phone:  [phone]     bacitracin ophthalmic ointment                Primary Care Provider Office Phone # Fax #    Frankie Kimbrough [phone]       [address] [facility] [address]        Equal Access to Services     ANKITA MARTINEZ AH: Hadii aad ku hadasho Soomaali, waaxda luqadaha, qaybta kaalmada adeegyada, maximo llanos haysaw carrasco. So [facility] [phone].    ATENCIÓN: Si habla español, tiene a gray disposición servicios gratuitos de asistencia lingüística. Llame al [phone].    We comply with applicable federal civil rights laws and [place] laws. We do not discriminate on the basis of race, color, national origin, age, disability, sex, sexual orientation, or gender identity.            Thank you!     Thank you for choosing [facility]  for your care. Our goal is  always to provide you with excellent care. Hearing back from our patients is one way we can continue to improve our services. Please take a few minutes to complete the written survey that you may receive in the mail after your visit with us. Thank you!             Your Updated Medication List - Protect others around you: Learn how to safely use, store and throw away your medicines at www.disposemymeds.org.          This list is accurate as of: 11/2/17 10:12 AM.  Always use your most recent med list.                   Brand Name Dispense Instructions for use Diagnosis    amLODIPine 10 MG tablet    NORVASC    90 tablet    TAKE 1 TABLET(10 MG) BY MOUTH DAILY    Essential hypertension with goal blood pressure less than 140/90       bacitracin ophthalmic ointment     3.5 g    Apply 1 Application to eye 4 times daily for 7 days    Hordeolum externum of left upper eyelid       FISH OIL PO      Take 1,000 mg by mouth 3 TABLETS DAILY        indomethacin 25 MG capsule    INDOCIN    30 capsule    TAKE 2 CAPSULE BY MOUTH TWICE DAILY AS NEEDED WITH MEALS    Idiopathic chronic gout of right foot without tophus       lisinopril-hydrochlorothiazide 20-12.5 MG per tablet    PRINZIDE/ZESTORETIC    180 tablet    TAKE 2 TABLETS BY MOUTH DAILY    Essential hypertension with goal blood pressure less than 140/90       metoprolol 100 MG 24 hr tablet    TOPROL-XL    30 tablet    Take 1 tablet (100 mg) by mouth 2 times daily    Essential hypertension with goal blood pressure less than 140/90       potassium chloride SA 20 MEQ CR tablet    KLOR-CON    30 tablet    Take 1 tablet (20 mEq) by mouth daily    Hypokalemia       TYLENOL 8 HOUR PO      Take  by mouth. As needed

## 2017-11-14 ENCOUNTER — OFFICE VISIT (OUTPATIENT)
Dept: FAMILY MEDICINE | Facility: CLINIC | Age: 57
End: 2017-11-14
Payer: COMMERCIAL

## 2017-11-14 VITALS
SYSTOLIC BLOOD PRESSURE: 144 MMHG | OXYGEN SATURATION: 96 % | HEART RATE: 76 BPM | WEIGHT: 276 LBS | TEMPERATURE: 97.8 F | BODY MASS INDEX: 35.92 KG/M2 | DIASTOLIC BLOOD PRESSURE: 87 MMHG

## 2017-11-14 DIAGNOSIS — I10 HYPERTENSION GOAL BP (BLOOD PRESSURE) < 140/90: Primary | ICD-10-CM

## 2017-11-14 PROCEDURE — 99213 OFFICE O/P EST LOW 20 MIN: CPT | Performed by: FAMILY MEDICINE

## 2017-11-14 RX ORDER — CARVEDILOL 3.12 MG/1
3.12 TABLET ORAL 2 TIMES DAILY WITH MEALS
Qty: 60 TABLET | Refills: 0 | Status: SHIPPED | OUTPATIENT
Start: 2017-11-14 | End: 2017-12-23

## 2017-11-14 NOTE — PATIENT INSTRUCTIONS
We are going to change your metoprolol to coreg    Take 1.5 tablets for three days then   Take 1 tablet for three days then   Take 0.5 tab for three days then stop   The day after the last pill start coreg 3.125 mg two times  a day

## 2017-11-14 NOTE — PROGRESS NOTES
SUBJECTIVE:   Thien Zarate is a 57 year old male who presents to clinic today for the following health issues:    Blood pressure follow up    Complains of wrist pain   Just got a stye that has resolved     O:/87 (BP Location: Right arm, Patient Position: Sitting, Cuff Size: Adult Large)  Pulse 76  Temp 97.8  F (36.6  C) (Oral)  Wt 276 lb (125.2 kg)  SpO2 96%  BMI 35.92 kg/m2      In NAD     Head: Normocephalic, atraumatic.  Eyes: Conjunctiva clear, non icteric. PERRLA.  Ears: External ears and TMs normal BL.  Nose: Septum midline, nasal mucosa pink and moist. No discharge.  Mouth / Throat: Normal dentition.  No oral lesions. Pharynx non erythematous, tonsils without hypertrophy.  Neck: Supple, no enlarged LN, trachea midline.    Chest wall normal to inspection and palpation. Good excursion bilaterally. Lungs clear to auscultation. Good air movement bilaterally without rales, wheezes, or rhonchi.   Regular rate and  rhythm. S1 and S2 normal, no murmurs, clicks, gallops or rubs. No edema or JVD.    Repeat bp was the same       ICD-10-CM    1. Hypertension goal BP (blood pressure) < 140/90 I10 carvedilol (COREG) 3.125 MG tablet     Patient will return in 3 weeks for recheck of bp .   See patient instructions   Plan would be to get his bp up to 25 mg 2 x a day if needed

## 2017-11-14 NOTE — NURSING NOTE
"Chief Complaint   Patient presents with     Hypertension     Follow up       Initial /87 (BP Location: Right arm, Patient Position: Sitting, Cuff Size: Adult Large)  Pulse 76  Temp 97.8  F (36.6  C) (Oral)  Wt 276 lb (125.2 kg)  SpO2 96%  BMI 35.92 kg/m2 Estimated body mass index is 35.92 kg/(m^2) as calculated from the following:    Height as of 9/14/17: 6' 1.5\" (1.867 m).    Weight as of this encounter: 276 lb (125.2 kg).  Medication Reconciliation: complete   Desiree Carolina MA      "

## 2017-12-04 DIAGNOSIS — I10 ESSENTIAL HYPERTENSION WITH GOAL BLOOD PRESSURE LESS THAN 140/90: ICD-10-CM

## 2017-12-05 DIAGNOSIS — I10 ESSENTIAL HYPERTENSION WITH GOAL BLOOD PRESSURE LESS THAN 140/90: ICD-10-CM

## 2017-12-05 RX ORDER — AMLODIPINE BESYLATE 10 MG/1
TABLET ORAL
Qty: 90 TABLET | Refills: 0 | Status: SHIPPED | OUTPATIENT
Start: 2017-12-05 | End: 2018-03-06

## 2017-12-05 RX ORDER — METOPROLOL SUCCINATE 50 MG/1
TABLET, EXTENDED RELEASE ORAL
Qty: 270 TABLET | Refills: 0 | OUTPATIENT
Start: 2017-12-05

## 2017-12-05 RX ORDER — METOPROLOL SUCCINATE 100 MG/1
TABLET, EXTENDED RELEASE ORAL
Qty: 180 TABLET | Refills: 1 | OUTPATIENT
Start: 2017-12-05

## 2017-12-05 NOTE — TELEPHONE ENCOUNTER
Requested Prescriptions   Pending Prescriptions Disp Refills     amLODIPine (NORVASC) 10 MG tablet [Pharmacy Med Name: AMLODIPINE BESYLATE 10MG TABLETS] 90 tablet 0     Sig: TAKE 1 TABLET(10 MG) BY MOUTH DAILY    Calcium Channel Blockers Protocol  Failed    12/4/2017  8:31 AM       Failed - Blood pressure under 140/90    BP Readings from Last 3 Encounters:   11/14/17 144/87   11/02/17 128/80   09/14/17 (!) 138/100                Passed - Recent or future visit with authorizing provider    Patient had office visit in the last year or has a visit in the next 30 days with authorizing provider.  See chart review.              Passed - Patient is age 18 or older       Passed - Normal serum creatinine on file in past 12 months    Recent Labs   Lab Test  09/14/17   1756   CR  0.66             metoprolol (TOPROL-XL) 100 MG 24 hr tablet [Pharmacy Med Name: METOPROLOL ER SUCCINATE 100MG TABS] 180 tablet 1     Sig: TAKE 1 TABLET(100 MG) BY MOUTH TWICE DAILY    Beta-Blockers Protocol Failed    12/4/2017  8:31 AM       Failed - Blood pressure under 140/90    BP Readings from Last 3 Encounters:   11/14/17 144/87   11/02/17 128/80   09/14/17 (!) 138/100                Passed - Patient is age 6 or older       Passed - Recent or future visit with authorizing provider's specialty    Patient had office visit in the last year or has a visit in the next 30 days with authorizing provider.  See chart review.

## 2017-12-05 NOTE — TELEPHONE ENCOUNTER
Per last OV note:    We are going to change your metoprolol to coreg     Take 1.5 tablets for three days then   Take 1 tablet for three days then   Take 0.5 tab for three days then stop   The day after the last pill start coreg 3.125 mg two times  a day     Metoprolol denied.  Amlodipine Prescription approved per [facility] Refill Protocol.    Asiya Mata RN  [facility]

## 2017-12-06 NOTE — TELEPHONE ENCOUNTER
See other encounter.  Patient was switched to Coreg.    Asiya Mata RN  [facility]

## 2017-12-06 NOTE — TELEPHONE ENCOUNTER
Requested Prescriptions   Pending Prescriptions Disp Refills     metoprolol (TOPROL-XL) 50 MG 24 hr tablet [Pharmacy Med Name: METOPROLOL ER SUCCINATE 50MG TABS] 270 tablet 0     Sig: TAKE 3 TABLETS(150 MG) BY MOUTH DAILY    Beta-Blockers Protocol Failed    12/5/2017  3:47 AM       Failed - Blood pressure under 140/90    BP Readings from Last 3 Encounters:   11/14/17 144/87   11/02/17 128/80   09/14/17 (!) 138/100                Passed - Patient is age 6 or older       Passed - Recent or future visit with authorizing provider's specialty    Patient had office visit in the last year or has a visit in the next 30 days with authorizing provider.  See chart review.

## 2017-12-23 DIAGNOSIS — I10 HYPERTENSION GOAL BP (BLOOD PRESSURE) < 140/90: ICD-10-CM

## 2017-12-27 RX ORDER — CARVEDILOL 3.12 MG/1
TABLET ORAL
Qty: 60 TABLET | Refills: 0 | Status: SHIPPED | OUTPATIENT
Start: 2017-12-27 | End: 2017-12-29

## 2017-12-27 NOTE — TELEPHONE ENCOUNTER
Medication is being filled for 1 time refill only due to:  Patient needs to be seen because due for BP check.     Routed to team to assist with setting up needed visit.    Asiya Mata RN  [facility]

## 2017-12-27 NOTE — TELEPHONE ENCOUNTER
Requested Prescriptions   Pending Prescriptions Disp Refills     carvedilol (COREG) 3.125 MG tablet [Pharmacy Med Name: CARVEDILOL 3.125MG TABLETS] 60 tablet 0     Sig: TAKE 1 TABLET BY MOUTH TWICE DAILY WITH MEALS    Beta-Blockers Protocol Failed    12/23/2017 11:41 AM       Failed - Blood pressure under 140/90    BP Readings from Last 3 Encounters:   11/14/17 144/87   11/02/17 128/80   09/14/17 (!) 138/100                Passed - Patient is age 6 or older       Passed - Recent or future visit with authorizing provider's specialty    Patient had office visit in the last year or has a visit in the next 30 days with authorizing provider.  See chart review.

## 2017-12-29 ENCOUNTER — OFFICE VISIT (OUTPATIENT)
Dept: FAMILY MEDICINE | Facility: CLINIC | Age: 57
End: 2017-12-29
Payer: COMMERCIAL

## 2017-12-29 VITALS
HEART RATE: 71 BPM | TEMPERATURE: 97.2 F | SYSTOLIC BLOOD PRESSURE: 128 MMHG | DIASTOLIC BLOOD PRESSURE: 80 MMHG | OXYGEN SATURATION: 97 % | BODY MASS INDEX: 36.31 KG/M2 | WEIGHT: 279 LBS

## 2017-12-29 DIAGNOSIS — I10 HYPERTENSION GOAL BP (BLOOD PRESSURE) < 140/90: Primary | ICD-10-CM

## 2017-12-29 PROCEDURE — 99213 OFFICE O/P EST LOW 20 MIN: CPT | Performed by: FAMILY MEDICINE

## 2017-12-29 RX ORDER — CARVEDILOL 3.12 MG/1
3.12 TABLET ORAL 2 TIMES DAILY WITH MEALS
Qty: 180 TABLET | Refills: 3 | Status: SHIPPED | OUTPATIENT
Start: 2017-12-29 | End: 2018-09-27

## 2017-12-29 NOTE — MR AVS SNAPSHOT
After Visit Summary   12/29/2017    Thien Zarate    MRN: [id number]           Patient Information     Date Of Birth          1960        Visit Information        Provider Department      12/29/2017 3:20 PM Frankie Kimbrough MD [facility]        Today's Diagnoses     Hypertension goal BP (blood pressure) < 140/90    -  1       Follow-ups after your visit        Who to contact     If you have questions or need follow up information about today's clinic visit or your schedule please contact [facility] directly at [phone].  Normal or non-critical lab and imaging results will be communicated to you by [employer], letter or phone within 4 business days after the clinic has received the results. If you do not hear from us within 7 days, please contact the clinic through [employer] or phone. If you have a critical or abnormal lab result, we will notify you by phone as soon as possible.  Submit refill requests through [employer] or call your pharmacy and they will forward the refill request to us. Please allow 3 business days for your refill to be completed.          Additional Information About Your Visit        MyChart Information     [employer] gives you secure access to your electronic health record. If you see a primary care provider, you can also send messages to your care team and make appointments. If you have questions, please call your primary care clinic.  If you do not have a primary care provider, please call [phone] and they will assist you.        Care EveryWhere ID     This is your Care EveryWhere ID. This could be used by other organizations to access your [place] medical records  [id number]        Your Vitals Were     Pulse Temperature Pulse Oximetry BMI (Body Mass Index)          71 97.2  F (36.2  C) (Oral) 97% 36.31 kg/m2         Blood Pressure from Last 3 Encounters:   12/29/17 128/80   11/14/17 144/87   11/02/17 128/80    Weight  from Last 3 Encounters:   12/29/17 279 lb (126.6 kg)   11/14/17 276 lb (125.2 kg)   11/02/17 273 lb (123.8 kg)              Today, you had the following     No orders found for display         Today's Medication Changes          These changes are accurate as of: 12/29/17  3:53 PM.  If you have any questions, ask your nurse or doctor.               These medicines have changed or have updated prescriptions.        Dose/Directions    carvedilol 3.125 MG tablet   Commonly known as:  COREG   This may have changed:  See the new instructions.   Used for:  Hypertension goal BP (blood pressure) < 140/90   Changed by:  Frankie Kimbrough MD        Dose:  3.125 mg   Take 1 tablet (3.125 mg) by mouth 2 times daily (with meals)   Quantity:  180 tablet   Refills:  3            Where to get your medicines      These medications were sent to [facility] [address] [place] [address], [place] [address] AT [place] [address], [facility] [address]     Phone:  [phone]     carvedilol 3.125 MG tablet                Primary Care Provider Office Phone # Fax #    Frankie Kimbrough [phone]       [address] [facility] [address]        Equal Access to Services     JACOB MARTINEZ AH: Hadii amanda ku hadasho Soomaali, waaxda luqadaha, qaybta kaalmada adeegyada, waxay romi haysaw day . So [facility] [phone].    ATENCIÓN: Si habla español, tiene a gray disposición servicios gratuitos de asistencia lingüística. Llame al [phone].    We comply with applicable federal civil rights laws and [place] laws. We do not discriminate on the basis of race, color, national origin, age, disability, sex, sexual orientation, or gender identity.            Thank you!     Thank you for choosing [facility]  for your care. Our goal is always to provide you with excellent care. Hearing back from our patients is one way we can continue to improve our  services. Please take a few minutes to complete the written survey that you may receive in the mail after your visit with us. Thank you!             Your Updated Medication List - Protect others around you: Learn how to safely use, store and throw away your medicines at www.disposemymeds.org.          This list is accurate as of: 12/29/17  3:53 PM.  Always use your most recent med list.                   Brand Name Dispense Instructions for use Diagnosis    amLODIPine 10 MG tablet    NORVASC    90 tablet    TAKE 1 TABLET(10 MG) BY MOUTH DAILY    Essential hypertension with goal blood pressure less than 140/90       carvedilol 3.125 MG tablet    COREG    180 tablet    Take 1 tablet (3.125 mg) by mouth 2 times daily (with meals)    Hypertension goal BP (blood pressure) < 140/90       FISH OIL PO      Take 1,000 mg by mouth 3 TABLETS DAILY        indomethacin 25 MG capsule    INDOCIN    30 capsule    TAKE 2 CAPSULE BY MOUTH TWICE DAILY AS NEEDED WITH MEALS    Idiopathic chronic gout of right foot without tophus       lisinopril-hydrochlorothiazide 20-12.5 MG per tablet    PRINZIDE/ZESTORETIC    180 tablet    TAKE 2 TABLETS BY MOUTH DAILY    Essential hypertension with goal blood pressure less than 140/90       metoprolol 100 MG 24 hr tablet    TOPROL-XL    30 tablet    Take 1 tablet (100 mg) by mouth 2 times daily    Essential hypertension with goal blood pressure less than 140/90       potassium chloride SA 20 MEQ CR tablet    KLOR-CON    30 tablet    Take 1 tablet (20 mEq) by mouth daily    Hypokalemia       TYLENOL 8 HOUR PO      Take  by mouth. As needed

## 2017-12-29 NOTE — PROGRESS NOTES
SUBJECTIVE:   Thien Zarate is a 57 year old male who presents to clinic today for the following health issues:      Medication Followup of coreg    Taking Medication as prescribed: yes    Side Effects:  None    Medication Helping Symptoms:  Unsure     Current Outpatient Prescriptions   Medication Sig Dispense Refill     carvedilol (COREG) 3.125 MG tablet TAKE 1 TABLET BY MOUTH TWICE DAILY WITH MEALS 60 tablet 0     amLODIPine (NORVASC) 10 MG tablet TAKE 1 TABLET(10 MG) BY MOUTH DAILY 90 tablet 0     lisinopril-hydrochlorothiazide (PRINZIDE/ZESTORETIC) 20-12.5 MG per tablet TAKE 2 TABLETS BY MOUTH DAILY 180 tablet 0     indomethacin (INDOCIN) 25 MG capsule TAKE 2 CAPSULE BY MOUTH TWICE DAILY AS NEEDED WITH MEALS 30 capsule 0     Acetaminophen (TYLENOL 8 HOUR PO) Take  by mouth. As needed       Omega-3 Fatty Acids (FISH OIL PO) Take 1,000 mg by mouth 3 TABLETS DAILY       potassium chloride SA (POTASSIUM CHLORIDE) 20 MEQ CR tablet Take 1 tablet (20 mEq) by mouth daily (Patient not taking: Reported on 11/2/2017) 30 tablet 0     metoprolol (TOPROL-XL) 100 MG 24 hr tablet Take 1 tablet (100 mg) by mouth 2 times daily (Patient not taking: Reported on 12/29/2017) 30 tablet 1     No syncope       Ros: no chest pain, chest tightness   No sob   No leg edema   No abdominal pain     Denies fever or chills   Weight stable     O; /80  Pulse 71  Temp 97.2  F (36.2  C) (Oral)  Wt 279 lb (126.6 kg)  SpO2 97%  BMI 36.31 kg/m2    BP Readings from Last 3 Encounters:   12/29/17 139/85   11/14/17 144/87   11/02/17 128/80     Chest wall normal to inspection and palpation. Good excursion bilaterally. Lungs clear to auscultation. Good air movement bilaterally without rales, wheezes, or rhonchi.   Regular rate and  rhythm. S1 and S2 normal, no murmurs, clicks, gallops or rubs. No edema or JVD.      ICD-10-CM    1. Hypertension goal BP (blood pressure) < 140/90 I10 carvedilol (COREG) 3.125 MG tablet       Recheck in 6 months              Reviewed and updated as needed this visit by clinical staffTobacco  Allergies  Meds  Med Hx  Surg Hx  Fam Hx  Soc Hx      Reviewed and updated as needed this visit by Provider

## 2017-12-29 NOTE — NURSING NOTE
"Chief Complaint   Patient presents with     Hypertension     Hyperlipidemia       Initial /85 (BP Location: Right arm, Patient Position: Chair, Cuff Size: Adult Large)  Pulse 71  Temp 97.2  F (36.2  C) (Oral)  Wt 279 lb (126.6 kg)  SpO2 97%  BMI 36.31 kg/m2 Estimated body mass index is 36.31 kg/(m^2) as calculated from the following:    Height as of 9/14/17: 6' 1.5\" (1.867 m).    Weight as of this encounter: 279 lb (126.6 kg).  Medication Reconciliation: complete   Cariirena Redmond MA      "

## 2018-01-02 DIAGNOSIS — I10 ESSENTIAL HYPERTENSION WITH GOAL BLOOD PRESSURE LESS THAN 140/90: ICD-10-CM

## 2018-01-02 NOTE — TELEPHONE ENCOUNTER
Requested Prescriptions   Pending Prescriptions Disp Refills     lisinopril-hydrochlorothiazide (PRINZIDE/ZESTORETIC) 20-12.5 MG per tablet [Pharmacy Med Name: LISINOPRIL-HCTZ 20/12.5MG TABLETS] 180 tablet 0    Last Written Prescription Date:  10-2-17  Last Fill Quantity: 180,  # refills: 0   Last Office Visit with [facility], [facility] or [facility] prescribing provider:  12-29-17   Future Office Visit:      Sig: TAKE 2 TABLETS BY MOUTH DAILY    Diuretics (Including Combos) Protocol Failed    1/2/2018  3:46 AM       Failed - Normal serum potassium on file in past 12 months    Recent Labs   Lab Test  09/14/17   1756   POTASSIUM  3.3*                   Passed - Blood pressure under 140/90    BP Readings from Last 3 Encounters:   12/29/17 128/80   11/14/17 144/87   11/02/17 128/80                Passed - Recent or future visit with authorizing provider's specialty    Patient had office visit in the last year or has a visit in the next 30 days with authorizing provider.  See chart review.              Passed - Patient is age 18 or older       Passed - Normal serum creatinine on file in past 12 months    Recent Labs   Lab Test  09/14/17   1756   CR  0.66             Passed - Normal serum sodium on file in past 12 months    Recent Labs   Lab Test  09/14/17   1756   NA  142

## 2018-01-03 NOTE — TELEPHONE ENCOUNTER
Routing refill request to provider for review/approval because:  Labs out of range  Yamile Perez, JAMEL [facility] Triage.

## 2018-01-04 RX ORDER — LISINOPRIL AND HYDROCHLOROTHIAZIDE 12.5; 2 MG/1; MG/1
TABLET ORAL
Qty: 180 TABLET | Refills: 0 | Status: SHIPPED | OUTPATIENT
Start: 2018-01-04 | End: 2018-04-06

## 2018-03-06 DIAGNOSIS — I10 ESSENTIAL HYPERTENSION WITH GOAL BLOOD PRESSURE LESS THAN 140/90: ICD-10-CM

## 2018-03-06 RX ORDER — AMLODIPINE BESYLATE 10 MG/1
TABLET ORAL
Qty: 90 TABLET | Refills: 0 | Status: SHIPPED | OUTPATIENT
Start: 2018-03-06 | End: 2018-06-07

## 2018-03-06 NOTE — TELEPHONE ENCOUNTER
"Requested Prescriptions   Pending Prescriptions Disp Refills     amLODIPine (NORVASC) 10 MG tablet [Pharmacy Med Name: AMLODIPINE BESYLATE 10MG TABLETS] 90 tablet 0    Last Written Prescription Date:  12/5/17  Last Fill Quantity: 90,  # refills: 0   Last office visit: 12/29/2017 with prescribing provider:     Future Office Visit:     Sig: TAKE 1 TABLET(10 MG) BY MOUTH DAILY    Calcium Channel Blockers Protocol  Passed    3/6/2018 11:38 AM       Passed - Blood pressure under 140/90 in past 12 months    BP Readings from Last 3 Encounters:   12/29/17 128/80   11/14/17 144/87   11/02/17 128/80                Passed - Recent (12 mo) or future (30 days) visit within the authorizing provider's specialty    Patient had office visit in the last year or has a visit in the next 30 days with authorizing provider.  See \"Patient Info\" tab in inbasket, or \"Choose Columns\" in Meds & Orders section of the refill encounter.            Passed - Patient is age 18 or older       Passed - Normal serum creatinine on file in past 12 months    Recent Labs   Lab Test  09/14/17   1756   CR  0.66               "

## 2018-03-06 NOTE — TELEPHONE ENCOUNTER
Prescription approved per [facility] Refill Protocol.  Shazia Barrera,Clinic Rn  [facility] [place]

## 2018-04-06 DIAGNOSIS — I10 ESSENTIAL HYPERTENSION WITH GOAL BLOOD PRESSURE LESS THAN 140/90: ICD-10-CM

## 2018-04-06 RX ORDER — LISINOPRIL AND HYDROCHLOROTHIAZIDE 12.5; 2 MG/1; MG/1
TABLET ORAL
Qty: 180 TABLET | Refills: 0 | Status: SHIPPED | OUTPATIENT
Start: 2018-04-06 | End: 2018-07-07

## 2018-04-06 NOTE — TELEPHONE ENCOUNTER
Routing refill request to provider for review/approval because:  Labs out of range:  See below        Suleiman Richter RN

## 2018-04-06 NOTE — TELEPHONE ENCOUNTER
"Requested Prescriptions   Pending Prescriptions Disp Refills     lisinopril-hydrochlorothiazide (PRINZIDE/ZESTORETIC) 20-12.5 MG per tablet [Pharmacy Med Name: LISINOPRIL-HCTZ 20/12.5MG TABLETS] 180 tablet 0    Last Written Prescription Date:  1/4/18  Last Fill Quantity: 180,  # refills: 0   Last office visit: 12/29/2017 with prescribing provider:     Future Office Visit:   Next 5 appointments (look out 90 days)     Jun 29, 2018  3:20 PM CDT   [facility]    [address] [place] [id number] [phone]                  Sig: TAKE 2 TABLETS BY MOUTH DAILY    Diuretics (Including Combos) Protocol Failed    4/6/2018  3:46 AM       Failed - Normal serum potassium on file in past 12 months    Recent Labs   Lab Test  09/14/17   1756   POTASSIUM  3.3*                   Passed - Blood pressure under 140/90 in past 12 months    BP Readings from Last 3 Encounters:   12/29/17 128/80   11/14/17 144/87   11/02/17 128/80                Passed - Recent (12 mo) or future (30 days) visit within the authorizing provider's specialty    Patient had office visit in the last 12 months or has a visit in the next 30 days with authorizing provider or within the authorizing provider's specialty.  See \"Patient Info\" tab in inbasket, or \"Choose Columns\" in Meds & Orders section of the refill encounter.           Passed - Patient is age 18 or older       Passed - Normal serum creatinine on file in past 12 months    Recent Labs   Lab Test  09/14/17   1756   CR  0.66             Passed - Normal serum sodium on file in past 12 months    Recent Labs   Lab Test  09/14/17   1756   NA  142                "

## 2018-04-13 ENCOUNTER — TRANSFERRED RECORDS (OUTPATIENT)
Dept: HEALTH INFORMATION MANAGEMENT | Facility: CLINIC | Age: 58
End: 2018-04-13

## 2018-04-27 ENCOUNTER — TRANSFERRED RECORDS (OUTPATIENT)
Dept: HEALTH INFORMATION MANAGEMENT | Facility: CLINIC | Age: 58
End: 2018-04-27

## 2018-05-25 ENCOUNTER — TRANSFERRED RECORDS (OUTPATIENT)
Dept: HEALTH INFORMATION MANAGEMENT | Facility: CLINIC | Age: 58
End: 2018-05-25

## 2018-06-07 DIAGNOSIS — I10 ESSENTIAL HYPERTENSION WITH GOAL BLOOD PRESSURE LESS THAN 140/90: ICD-10-CM

## 2018-06-07 RX ORDER — AMLODIPINE BESYLATE 10 MG/1
TABLET ORAL
Qty: 90 TABLET | Refills: 0 | Status: SHIPPED | OUTPATIENT
Start: 2018-06-07 | End: 2018-09-09

## 2018-06-07 NOTE — TELEPHONE ENCOUNTER
"Requested Prescriptions   Pending Prescriptions Disp Refills     amLODIPine (NORVASC) 10 MG tablet [Pharmacy Med Name: AMLODIPINE BESYLATE 10MG TABLETS] 90 tablet 0    Last Written Prescription Date:  3-6-18  Last Fill Quantity: 90,  # refills: 0   Last office visit: 12/29/2017 with prescribing provider:     Future Office Visit:   Next 5 appointments (look out 90 days)     Jun 29, 2018  3:20 PM CDT   SHORT with Frankie Kimbrough MD   [facility]    [address] [place] [id number] [phone]                  Sig: TAKE 1 TABLET(10 MG) BY MOUTH DAILY    Calcium Channel Blockers Protocol  Passed    6/7/2018  3:46 AM       Passed - Blood pressure under 140/90 in past 12 months    BP Readings from Last 3 Encounters:   12/29/17 128/80   11/14/17 144/87   11/02/17 128/80                Passed - Recent (12 mo) or future (30 days) visit within the authorizing provider's specialty    Patient had office visit in the last 12 months or has a visit in the next 30 days with authorizing provider or within the authorizing provider's specialty.  See \"Patient Info\" tab in inbasket, or \"Choose Columns\" in Meds & Orders section of the refill encounter.           Passed - Patient is age 18 or older       Passed - Normal serum creatinine on file in past 12 months    Recent Labs   Lab Test  09/14/17   1756   CR  0.66               "

## 2018-06-07 NOTE — TELEPHONE ENCOUNTER
Medication is being filled for 1 time refill only due to:  Patient needs to be seen because per copy of plan below.   Pt has appt this month, 6-2018.    Recheck in 6 months

## 2018-06-22 ENCOUNTER — TRANSFERRED RECORDS (OUTPATIENT)
Dept: HEALTH INFORMATION MANAGEMENT | Facility: CLINIC | Age: 58
End: 2018-06-22

## 2018-07-07 DIAGNOSIS — I10 ESSENTIAL HYPERTENSION WITH GOAL BLOOD PRESSURE LESS THAN 140/90: ICD-10-CM

## 2018-07-09 NOTE — TELEPHONE ENCOUNTER
"Requested Prescriptions   Pending Prescriptions Disp Refills     lisinopril-hydrochlorothiazide (PRINZIDE/ZESTORETIC) 20-12.5 MG per tablet [Pharmacy Med Name: LISINOPRIL-HCTZ 20/12.5MG TABLETS] 180 tablet 0    Last Written Prescription Date:  4/6/18  Last Fill Quantity: 180,  # refills: 0   Last office visit: 12/29/2017 with prescribing provider:     Future Office Visit:     Sig: TAKE 2 TABLETS BY MOUTH DAILY    Diuretics (Including Combos) Protocol Failed    7/7/2018  3:46 AM       Failed - Normal serum potassium on file in past 12 months    Recent Labs   Lab Test  09/14/17   1756   POTASSIUM  3.3*                   Passed - Blood pressure under 140/90 in past 12 months    BP Readings from Last 3 Encounters:   12/29/17 128/80   11/14/17 144/87   11/02/17 128/80                Passed - Recent (12 mo) or future (30 days) visit within the authorizing provider's specialty    Patient had office visit in the last 12 months or has a visit in the next 30 days with authorizing provider or within the authorizing provider's specialty.  See \"Patient Info\" tab in inbasket, or \"Choose Columns\" in Meds & Orders section of the refill encounter.           Passed - Patient is age 18 or older       Passed - Normal serum creatinine on file in past 12 months    Recent Labs   Lab Test  09/14/17   1756   CR  0.66             Passed - Normal serum sodium on file in past 12 months    Recent Labs   Lab Test  09/14/17   1756   NA  142                "

## 2018-07-10 RX ORDER — LISINOPRIL AND HYDROCHLOROTHIAZIDE 12.5; 2 MG/1; MG/1
TABLET ORAL
Qty: 180 TABLET | Refills: 0 | Status: SHIPPED | OUTPATIENT
Start: 2018-07-10 | End: 2018-10-07

## 2018-07-10 NOTE — TELEPHONE ENCOUNTER
Routing refill request to provider for review/approval because:  Protocol failed d/t potassium level.    Paradise Clemente RN  [facility]

## 2018-07-20 ENCOUNTER — TRANSFERRED RECORDS (OUTPATIENT)
Dept: HEALTH INFORMATION MANAGEMENT | Facility: CLINIC | Age: 58
End: 2018-07-20

## 2018-07-31 ENCOUNTER — TRANSFERRED RECORDS (OUTPATIENT)
Dept: HEALTH INFORMATION MANAGEMENT | Facility: CLINIC | Age: 58
End: 2018-07-31

## 2018-08-03 ENCOUNTER — TRANSFERRED RECORDS (OUTPATIENT)
Dept: HEALTH INFORMATION MANAGEMENT | Facility: CLINIC | Age: 58
End: 2018-08-03

## 2018-08-06 ENCOUNTER — HEALTH MAINTENANCE LETTER (OUTPATIENT)
Age: 58
End: 2018-08-06

## 2018-08-08 ENCOUNTER — TRANSFERRED RECORDS (OUTPATIENT)
Dept: HEALTH INFORMATION MANAGEMENT | Facility: CLINIC | Age: 58
End: 2018-08-08

## 2018-09-09 DIAGNOSIS — I10 ESSENTIAL HYPERTENSION WITH GOAL BLOOD PRESSURE LESS THAN 140/90: ICD-10-CM

## 2018-09-10 NOTE — TELEPHONE ENCOUNTER
"Requested Prescriptions   Pending Prescriptions Disp Refills     amLODIPine (NORVASC) 10 MG tablet [Pharmacy Med Name: AMLODIPINE BESYLATE 10MG TABLETS] 90 tablet 0    Last Written Prescription Date:  6/7/18  Last Fill Quantity: 90,  # refills: 0   Last office visit: 12/29/2017 with prescribing provider:     Future Office Visit:     Sig: TAKE 1 TABLET(10 MG) BY MOUTH DAILY    Calcium Channel Blockers Protocol  Passed    9/9/2018  3:46 AM       Passed - Blood pressure under 140/90 in past 12 months    BP Readings from Last 3 Encounters:   12/29/17 128/80   11/14/17 144/87   11/02/17 128/80                Passed - Recent (12 mo) or future (30 days) visit within the authorizing provider's specialty    Patient had office visit in the last 12 months or has a visit in the next 30 days with authorizing provider or within the authorizing provider's specialty.  See \"Patient Info\" tab in inbasket, or \"Choose Columns\" in Meds & Orders section of the refill encounter.           Passed - Patient is age 18 or older       Passed - Normal serum creatinine on file in past 12 months    Recent Labs   Lab Test  09/14/17   1756   CR  0.66               "

## 2018-09-11 RX ORDER — AMLODIPINE BESYLATE 10 MG/1
TABLET ORAL
Qty: 90 TABLET | Refills: 0 | Status: SHIPPED | OUTPATIENT
Start: 2018-09-11 | End: 2018-12-08

## 2018-09-11 NOTE — TELEPHONE ENCOUNTER
Prescription approved per [facility] Refill Protocol.  Crystal Mccollum RN

## 2018-09-17 ENCOUNTER — TRANSFERRED RECORDS (OUTPATIENT)
Dept: HEALTH INFORMATION MANAGEMENT | Facility: CLINIC | Age: 58
End: 2018-09-17

## 2018-09-27 ENCOUNTER — OFFICE VISIT (OUTPATIENT)
Dept: FAMILY MEDICINE | Facility: CLINIC | Age: 58
End: 2018-09-27
Payer: OTHER MISCELLANEOUS

## 2018-09-27 VITALS
SYSTOLIC BLOOD PRESSURE: 138 MMHG | OXYGEN SATURATION: 97 % | HEART RATE: 75 BPM | DIASTOLIC BLOOD PRESSURE: 95 MMHG | TEMPERATURE: 97.6 F | BODY MASS INDEX: 34.27 KG/M2 | HEIGHT: 74 IN | WEIGHT: 267 LBS

## 2018-09-27 DIAGNOSIS — M25.532 LEFT WRIST PAIN: ICD-10-CM

## 2018-09-27 DIAGNOSIS — Z01.818 PREOP GENERAL PHYSICAL EXAM: Primary | ICD-10-CM

## 2018-09-27 DIAGNOSIS — I10 HYPERTENSION GOAL BP (BLOOD PRESSURE) < 140/90: ICD-10-CM

## 2018-09-27 PROCEDURE — 99214 OFFICE O/P EST MOD 30 MIN: CPT | Performed by: FAMILY MEDICINE

## 2018-09-27 PROCEDURE — 93000 ELECTROCARDIOGRAM COMPLETE: CPT | Performed by: FAMILY MEDICINE

## 2018-09-27 RX ORDER — CARVEDILOL 6.25 MG/1
6.25 TABLET ORAL 2 TIMES DAILY WITH MEALS
Qty: 180 TABLET | Refills: 3 | Status: SHIPPED | OUTPATIENT
Start: 2018-09-27 | End: 2018-10-29

## 2018-09-27 NOTE — Clinical Note
Dr. Bo.  He requires evaluation and anesthesia risk assessment prior to undergoing surgery/procedure for treatment of left wrist .  Fax number for surgical facility: [phone]

## 2018-09-27 NOTE — PROGRESS NOTES
[address] [id number] [phone]  Dept: [phone]    PRE-OP EVALUATION:  Today's date: 2018    Thien Zarate (: 1960) presents for pre-operative evaluation assessment as requested by Dr. Bo.  He requires evaluation and anesthesia risk assessment prior to undergoing surgery/procedure for treatment of left wrist .    Fax number for surgical facility: [phone]  Primary Physician: Frankie Kimbrough  Type of Anesthesia Anticipated: to be determined    Patient has a Health Care Directive or Living Will:  YES    Preop Questions 2018   Who is doing your surgery? dr bo [facility]   What are you having done? left wrist surgery   Date of Surgery/Procedure:    Facility or Hospital where procedure/surgery will be performed: [facility]   1.  Do you have a history of Heart attack, stroke, stent, coronary bypass surgery, or other heart surgery? No   2.  Do you ever have any pain or discomfort in your chest? No   3.  Do you have a history of  Heart Failure? No   4.   Are you troubled by shortness of breath when:  walking on a level surface, or up a slight hill, or at night? No   5.  Do you currently have a cold, bronchitis or other respiratory infection? No   6.  Do you have a cough, shortness of breath, or wheezing? No   7.  Do you sometimes get pains in the calves of your legs when you walk? No   8. Do you or anyone in your family have previous history of blood clots? UNKNOWN   9.  Do you or does anyone in your family have a serious bleeding problem such as prolonged bleeding following surgeries or cuts? No   10. Have you ever had problems with anemia or been told to take iron pills? No   11. Have you had any abnormal blood loss such as black, tarry or bloody stools? No   12. Have you ever had a blood transfusion? UNKNOWN   13. Have you or any of your relatives ever had  problems with anesthesia? No   14. Do you have sleep apnea, excessive snoring or daytime drowsiness? No   15. Do you have any prosthetic heart valves? No   16. Do you have prosthetic joints? No         HPI:     HPI related to upcoming procedure: Pain in the left wrist about 7 months ago.  He was grabbing frozen garbage out of carts.  He has to snap them out.  He did feel a little twinge.  He noticed that it started to swell by the end of the day.  He was told he had some bone spurs and carpal tunnel syndrome.  He failed injections and physical therapy   He is currently using K/T tape.  No numbness in the hand.  MRI and emg's done at [facility].    Past Medical History:   Diagnosis Date     Arthritis      Gout      Hypertension        MEDICAL HISTORY:     Patient Active Problem List    Diagnosis Date Noted     Morbid obesity (H) 11/02/2017     Priority: Medium     Non morbid obesity due to excess calories 09/14/2017     Priority: Medium     Advanced directives, counseling/discussion 09/02/2016     Priority: Medium     Advance Care Planning 9/2/2016: ACP Review of Chart / Resources Provided:  Reviewed chart for advance care plan.  Thien Zarate has no plan or code status on file. Discussed available resources and provided with information. Confirmed code status reflects current choices pending further ACP discussions.  Confirmed/documented legally designated decision makers.  Added by Yessenia Diez             Hyperlipidemia LDL goal <130 06/04/2013     Priority: Medium     Hypertension goal BP (blood pressure) < 140/90 08/25/2011     Priority: Medium      Past Medical History:   Diagnosis Date     Arthritis      Gout      Hypertension      Past Surgical History:   Procedure Laterality Date     DISCECTOMY LUMBAR POSTERIOR MICROSCOPIC ONE LEVEL Left 3/24/2015    Procedure: DISCECTOMY LUMBAR POSTERIOR MICROSCOPIC ONE LEVEL;  Surgeon: Cricket Stone MD;  Location:  OR      SACROPLASTY      and also  laminectomy, unsure what level     Left MMT arthroscopy  2012     ORTHOPEDIC SURGERY  12/2011    Rt knee     ORTHOPEDIC SURGERY  3/9/2012    Lt knee     Rt MMT arthroscopy  2011     Current Outpatient Prescriptions   Medication Sig Dispense Refill     Acetaminophen (TYLENOL 8 HOUR PO) Take  by mouth. As needed       amLODIPine (NORVASC) 10 MG tablet TAKE 1 TABLET(10 MG) BY MOUTH DAILY 90 tablet 0     carvedilol (COREG) 3.125 MG tablet Take 1 tablet (3.125 mg) by mouth 2 times daily (with meals) 180 tablet 3     indomethacin (INDOCIN) 25 MG capsule TAKE 2 CAPSULE BY MOUTH TWICE DAILY AS NEEDED WITH MEALS 30 capsule 0     lisinopril-hydrochlorothiazide (PRINZIDE/ZESTORETIC) 20-12.5 MG per tablet TAKE 2 TABLETS BY MOUTH DAILY 180 tablet 0     Omega-3 Fatty Acids (FISH OIL PO) Take 1,000 mg by mouth 3 TABLETS DAILY       metoprolol (TOPROL-XL) 100 MG 24 hr tablet Take 1 tablet (100 mg) by mouth 2 times daily (Patient not taking: Reported on 12/29/2017) 30 tablet 1     potassium chloride SA (POTASSIUM CHLORIDE) 20 MEQ CR tablet Take 1 tablet (20 mEq) by mouth daily (Patient not taking: Reported on 11/2/2017) 30 tablet 0     OTC products: NSAIDS    Allergies   Allergen Reactions     Penicillins Unknown      Latex Allergy: NO    Social History   Substance Use Topics     Smoking status: Former Smoker     Years: 30.00     Types: Cigars     Quit date: 6/3/1979     Smokeless tobacco: Never Used     Alcohol use Yes     History   Drug Use No       REVIEW OF SYSTEMS:   CONSTITUTIONAL: NEGATIVE for fever, chills, change in weight  INTEGUMENTARY/SKIN: NEGATIVE for worrisome rashes, moles or lesions  EYES: NEGATIVE for vision changes or irritation  ENT/MOUTH: NEGATIVE for ear, mouth and throat problems  RESP: NEGATIVE for significant cough or SOB  BREAST: NEGATIVE for masses, tenderness or discharge  CV: NEGATIVE for chest pain, palpitations or peripheral edema  GI: NEGATIVE for nausea, abdominal pain, heartburn, or change in bowel  "habits  : NEGATIVE for frequency, dysuria, or hematuria  MUSCULOSKELETAL: NEGATIVE for significant arthralgias or myalgia  NEURO: NEGATIVE for weakness, dizziness or paresthesias  ENDOCRINE: NEGATIVE for temperature intolerance, skin/hair changes  HEME: NEGATIVE for bleeding problems  PSYCHIATRIC: NEGATIVE for changes in mood or affect    EXAM:   BP (!) 138/95 (BP Location: Right arm, Patient Position: Chair, Cuff Size: Adult Large)  Pulse 75  Temp 97.6  F (36.4  C) (Oral)  Ht 6' 2\" (1.88 m)  Wt 267 lb (121.1 kg)  SpO2 97%  BMI 34.28 kg/m2    GENERAL APPEARANCE: healthy, alert and no distress     EYES: EOMI,  PERRL     HENT: ear canals and TM's normal and nose and mouth without ulcers or lesions     NECK: no adenopathy, no asymmetry, masses, or scars and thyroid normal to palpation     RESP: lungs clear to auscultation - no rales, rhonchi or wheezes     CV: regular rates and rhythm, normal S1 S2, no S3 or S4 and no murmur, click or rub     ABDOMEN:  soft, nontender, no HSM or masses and bowel sounds normal     MS: extremities normal- no gross deformities noted, no evidence of inflammation in joints, FROM in all extremities.     SKIN: no suspicious lesions or rashes     NEURO: Normal strength and tone, sensory exam grossly normal, mentation intact and speech normal     PSYCH: mentation appears normal. and affect normal/bright     LYMPHATICS: No cervical adenopathy    DIAGNOSTICS:   EKG: Not indicated due to non-vascular surgery and low risk of event (age <65 and without cardiac risk factors)    Recent Labs   Lab Test  09/14/17   1756  09/01/16   1607  06/16/15   1530   03/06/12   1825   HGB  14.7   --   14.1   < >  14.1   PLT  222   --   229   < >  186   NA  142  137   --    < >  142   POTASSIUM  3.3*  3.4   --    < >  3.8   CR  0.66  0.80   --    < >  0.79   A1C   --    --    --    --   5.3    < > = values in this interval not displayed.        IMPRESSION:   Reason for surgery/procedure: left wrist pain "   Diagnosis/reason for consult: tendonitis, carpal tunnel and bone spurs of the left wrist     The proposed surgical procedure is considered LOW risk.    REVISED CARDIAC RISK INDEX    No serious cardiac risks  INTERPRETATION: 0 risks: Class I (very low risk - 0.4% complication rate)    The patient has the following additional risks for perioperative complications:  No identified additional risks      ICD-10-CM    1. Preop general physical exam Z01.818    2. Hypertension goal BP (blood pressure) < 140/90 I10 carvedilol (COREG) 6.25 MG tablet     EKG 12-lead complete w/read - Clinics   3. Left wrist pain M25.532        RECOMMENDATIONS:       --Patient is to take all scheduled medications on the day of surgery EXCEPT for modifications listed below.    Anticoagulant or Antiplatelet Medication Use  NSAIDS: Ibuprofen (Motrin):         Stop one day prior to surgery        APPROVAL GIVEN to proceed with proposed procedure, without further diagnostic evaluation       Signed Electronically by: Frankie Kimbrough MD    Copy of this evaluation report is provided to requesting physician.    [facility] Preop Guidelines    Revised Cardiac Risk Index

## 2018-09-27 NOTE — MR AVS SNAPSHOT
After Visit Summary   9/27/2018    Thien Zarate    MRN: [id number]           Patient Information     Date Of Birth          1960        Visit Information        Provider Department      9/27/2018 9:00 AM Frankie Kimbrough MD [facility]        Today's Diagnoses     Preop general physical exam    -  1    Hypertension goal BP (blood pressure) < 140/90          Care Instructions      Before Your Surgery      Call your surgeon if there is any change in your health. This includes signs of a cold or flu (such as a sore throat, runny nose, cough, rash or fever).    Do not smoke, drink alcohol or take over the counter medicine (unless your surgeon or primary care doctor tells you to) for the 24 hours before and after surgery.    If you take prescribed drugs: Follow your doctor s orders about which medicines to take and which to stop until after surgery.    Eating and drinking prior to surgery: follow the instructions from your surgeon    Take a shower or bath the night before surgery. Use the soap your surgeon gave you to gently clean your skin. If you do not have soap from your surgeon, use your regular soap. Do not shave or scrub the surgery site.  Wear clean pajamas and have clean sheets on your bed.           Follow-ups after your visit        Who to contact     If you have questions or need follow up information about today's clinic visit or your schedule please contact [facility] directly at [phone].  Normal or non-critical lab and imaging results will be communicated to you by MyChart, letter or phone within 4 business days after the clinic has received the results. If you do not hear from us within 7 days, please contact the clinic through MyChart or phone. If you have a critical or abnormal lab result, we will notify you by phone as soon as possible.  Submit refill requests through Liquid Spins or call your pharmacy and they will forward the  "refill request to us. Please allow 3 business days for your refill to be completed.          Additional Information About Your Visit        KryptiqharMy eStore App Information     Outbrain gives you secure access to your electronic health record. If you see a primary care provider, you can also send messages to your care team and make appointments. If you have questions, please call your primary care clinic.  If you do not have a primary care provider, please call [phone] and they will assist you.        Care EveryWhere ID     This is your Care EveryWhere ID. This could be used by other organizations to access your [place] medical records  [id number]        Your Vitals Were     Pulse Temperature Height Pulse Oximetry BMI (Body Mass Index)       75 97.6  F (36.4  C) (Oral) 6' 2\" (1.88 m) 97% 34.28 kg/m2        Blood Pressure from Last 3 Encounters:   09/27/18 (!) 138/95   12/29/17 128/80   11/14/17 144/87    Weight from Last 3 Encounters:   09/27/18 267 lb (121.1 kg)   12/29/17 279 lb (126.6 kg)   11/14/17 276 lb (125.2 kg)              Today, you had the following     No orders found for display         Today's Medication Changes          These changes are accurate as of 9/27/18  9:33 AM.  If you have any questions, ask your nurse or doctor.               These medicines have changed or have updated prescriptions.        Dose/Directions    carvedilol 6.25 MG tablet   Commonly known as:  COREG   This may have changed:    - medication strength  - how much to take   Used for:  Hypertension goal BP (blood pressure) < 140/90   Changed by:  Frankie Kimbrough MD        Dose:  6.25 mg   Take 1 tablet (6.25 mg) by mouth 2 times daily (with meals)   Quantity:  180 tablet   Refills:  3            Where to get your medicines      Some of these will need a paper prescription and others can be bought over the counter.  Ask your nurse if you have questions.     Bring a paper prescription for each of these medications     carvedilol " 6.25 MG tablet                Primary Care Provider Office Phone # Fax #    Frankie Kimbrough [phone]       [address] [facility] [address]        Equal Access to Services     JACOB MARTINEZ : Hadii aad ku hadfransiscoo Somar, waaxda luqadaha, qaybta kaalmada adeegyada, maximo ruanon edison mc barb carrasco. So [facility] [phone].    ATENCIÓN: Si habla español, tiene a gray disposición servicios gratuitos de asistencia lingüística. Llame al [phone].    We comply with applicable federal civil rights laws and [place] laws. We do not discriminate on the basis of race, color, national origin, age, disability, sex, sexual orientation, or gender identity.            Thank you!     Thank you for choosing [facility]  for your care. Our goal is always to provide you with excellent care. Hearing back from our patients is one way we can continue to improve our services. Please take a few minutes to complete the written survey that you may receive in the mail after your visit with us. Thank you!             Your Updated Medication List - Protect others around you: Learn how to safely use, store and throw away your medicines at www.disposemymeds.org.          This list is accurate as of 9/27/18  9:33 AM.  Always use your most recent med list.                   Brand Name Dispense Instructions for use Diagnosis    amLODIPine 10 MG tablet    NORVASC    90 tablet    TAKE 1 TABLET(10 MG) BY MOUTH DAILY    Essential hypertension with goal blood pressure less than 140/90       carvedilol 6.25 MG tablet    COREG    180 tablet    Take 1 tablet (6.25 mg) by mouth 2 times daily (with meals)    Hypertension goal BP (blood pressure) < 140/90       FISH OIL PO      Take 1,000 mg by mouth 3 TABLETS DAILY        indomethacin 25 MG capsule    INDOCIN    30 capsule    TAKE 2 CAPSULE BY MOUTH TWICE DAILY AS NEEDED WITH MEALS    Idiopathic chronic gout of right foot without tophus        lisinopril-hydrochlorothiazide 20-12.5 MG per tablet    PRINZIDE/ZESTORETIC    180 tablet    TAKE 2 TABLETS BY MOUTH DAILY    Essential hypertension with goal blood pressure less than 140/90       metoprolol succinate 100 MG 24 hr tablet    TOPROL-XL    30 tablet    Take 1 tablet (100 mg) by mouth 2 times daily    Essential hypertension with goal blood pressure less than 140/90       potassium chloride SA 20 MEQ CR tablet    KLOR-CON    30 tablet    Take 1 tablet (20 mEq) by mouth daily    Hypokalemia       TYLENOL 8 HOUR PO      Take  by mouth. As needed

## 2018-10-07 DIAGNOSIS — I10 ESSENTIAL HYPERTENSION WITH GOAL BLOOD PRESSURE LESS THAN 140/90: ICD-10-CM

## 2018-10-08 NOTE — TELEPHONE ENCOUNTER
"Requested Prescriptions   Pending Prescriptions Disp Refills     lisinopril-hydrochlorothiazide (PRINZIDE/ZESTORETIC) 20-12.5 MG per tablet [Pharmacy Med Name: LISINOPRIL-HCTZ 20/12.5MG TABLETS] 180 tablet 0    Last Written Prescription Date:  7/10/18  Last Fill Quantity: 180,  # refills: 0   Last office visit: 9/27/2018 with prescribing provider:     Future Office Visit:   Next 5 appointments (look out 90 days)     Oct 22, 2018  5:00 PM CDT   [facility]    [address] [place] [id number] [phone]                  Sig: TAKE 2 TABLETS BY MOUTH DAILY    Diuretics (Including Combos) Protocol Failed    10/7/2018  3:56 AM       Failed - Blood pressure under 140/90 in past 12 months    BP Readings from Last 3 Encounters:   09/27/18 (!) 138/95   12/29/17 128/80   11/14/17 144/87                Failed - Normal serum creatinine on file in past 12 months    Recent Labs   Lab Test  09/14/17   1756   CR  0.66             Failed - Normal serum potassium on file in past 12 months    Recent Labs   Lab Test  09/14/17   1756   POTASSIUM  3.3*                   Failed - Normal serum sodium on file in past 12 months    Recent Labs   Lab Test  09/14/17   1756   NA  142             Passed - Recent (12 mo) or future (30 days) visit within the authorizing provider's specialty    Patient had office visit in the last 12 months or has a visit in the next 30 days with authorizing provider or within the authorizing provider's specialty.  See \"Patient Info\" tab in inbasket, or \"Choose Columns\" in Meds & Orders section of the refill encounter.           Passed - Patient is age 18 or older          "

## 2018-10-09 RX ORDER — LISINOPRIL AND HYDROCHLOROTHIAZIDE 12.5; 2 MG/1; MG/1
TABLET ORAL
Qty: 60 TABLET | Refills: 0 | Status: SHIPPED | OUTPATIENT
Start: 2018-10-09 | End: 2018-11-07

## 2018-10-09 NOTE — TELEPHONE ENCOUNTER
Patient is scheduled for advised follow up, low potassium was previously addressed.    Medication is being filled for 1 time refill only due to:  Patient needs to be seen because due for BP follow up, is scheduled.     HOWEVER:    Routing refill request to provider for review/approval because:  Drug interaction warning      Anastacia Ulrich RN  [facility]

## 2018-10-12 ENCOUNTER — TRANSFERRED RECORDS (OUTPATIENT)
Dept: HEALTH INFORMATION MANAGEMENT | Facility: CLINIC | Age: 58
End: 2018-10-12

## 2018-10-29 ENCOUNTER — OFFICE VISIT (OUTPATIENT)
Dept: FAMILY MEDICINE | Facility: CLINIC | Age: 58
End: 2018-10-29
Payer: COMMERCIAL

## 2018-10-29 VITALS
BODY MASS INDEX: 35.69 KG/M2 | DIASTOLIC BLOOD PRESSURE: 92 MMHG | TEMPERATURE: 97.3 F | WEIGHT: 278 LBS | HEART RATE: 81 BPM | SYSTOLIC BLOOD PRESSURE: 146 MMHG | OXYGEN SATURATION: 96 %

## 2018-10-29 DIAGNOSIS — Z78.9 ALCOHOL USE: Primary | ICD-10-CM

## 2018-10-29 DIAGNOSIS — I10 HYPERTENSION GOAL BP (BLOOD PRESSURE) < 140/90: ICD-10-CM

## 2018-10-29 PROCEDURE — 80048 BASIC METABOLIC PNL TOTAL CA: CPT | Performed by: FAMILY MEDICINE

## 2018-10-29 PROCEDURE — 99213 OFFICE O/P EST LOW 20 MIN: CPT | Mod: 25 | Performed by: FAMILY MEDICINE

## 2018-10-29 PROCEDURE — 36415 COLL VENOUS BLD VENIPUNCTURE: CPT | Performed by: FAMILY MEDICINE

## 2018-10-29 RX ORDER — CARVEDILOL 12.5 MG/1
12.5 TABLET ORAL 2 TIMES DAILY WITH MEALS
Qty: 60 TABLET | Refills: 1 | Status: SHIPPED | OUTPATIENT
Start: 2018-10-29 | End: 2019-01-31

## 2018-10-29 NOTE — MR AVS SNAPSHOT
After Visit Summary   10/29/2018    Thien Zarate    MRN: [id number]           Patient Information     Date Of Birth          1960        Visit Information        Provider Department      10/29/2018 5:00 PM Frankie Kimbrough MD [facility]        Today's Diagnoses     Hypertension goal BP (blood pressure) < 140/90           Follow-ups after your visit        Who to contact     If you have questions or need follow up information about today's clinic visit or your schedule please contact [facility] directly at [phone].  Normal or non-critical lab and imaging results will be communicated to you by Tylr Mobilehart, letter or phone within 4 business days after the clinic has received the results. If you do not hear from us within 7 days, please contact the clinic through Tylr Mobilehart or phone. If you have a critical or abnormal lab result, we will notify you by phone as soon as possible.  Submit refill requests through Symphogen or call your pharmacy and they will forward the refill request to us. Please allow 3 business days for your refill to be completed.          Additional Information About Your Visit        MyChart Information     Symphogen gives you secure access to your electronic health record. If you see a primary care provider, you can also send messages to your care team and make appointments. If you have questions, please call your primary care clinic.  If you do not have a primary care provider, please call [phone] and they will assist you.        Care EveryWhere ID     This is your Care EveryWhere ID. This could be used by other organizations to access your [facility] medical records  [id number]        Your Vitals Were     Pulse Temperature Pulse Oximetry BMI (Body Mass Index)          81 97.3  F (36.3  C) (Oral) 96% 35.69 kg/m2         Blood Pressure from Last 3 Encounters:   10/29/18 (!) 146/92   09/27/18 (!) 138/95   12/29/17 128/80     Weight from Last 3 Encounters:   10/29/18 278 lb (126.1 kg)   09/27/18 267 lb (121.1 kg)   12/29/17 279 lb (126.6 kg)              We Performed the Following     Basic metabolic panel          Today's Medication Changes          These changes are accurate as of 10/29/18  5:28 PM.  If you have any questions, ask your nurse or doctor.               These medicines have changed or have updated prescriptions.        Dose/Directions    carvedilol 12.5 MG tablet   Commonly known as:  COREG   This may have changed:    - medication strength  - how much to take   Used for:  Hypertension goal BP (blood pressure) < 140/90   Changed by:  Frankie Kimbrough MD        Dose:  12.5 mg   Take 1 tablet (12.5 mg) by mouth 2 times daily (with meals)   Quantity:  60 tablet   Refills:  1         Stop taking these medicines if you haven't already. Please contact your care team if you have questions.     metoprolol succinate 100 MG 24 hr tablet   Commonly known as:  TOPROL-XL   Stopped by:  Frankie Kimbrough MD           potassium chloride SA 20 MEQ CR tablet   Commonly known as:  KLOR-CON   Stopped by:  Frankie Kimbrough MD                Where to get your medicines      [facility] [address] [place] [address] AT [place] [address], [facility] [address]     Phone:  [phone]     carvedilol 12.5 MG tablet                Primary Care Provider Office Phone # Fax #    Frankie Kimbrough [phone]       [address] [facility] [address]        Equal Access to Services     [facility] Center[facility]ANNI : Hadrebekah Cedeño, wachristianeda luinna, qaybta kaalmaximo pearce. So [facility] [phone].    ATENCIÓN: Si habla español, tiene a gray disposición servicios gratuitos de asistencia lingüística. Ariel al [phone].    We comply with applicable federal civil rights laws and [place]  laws. We do not discriminate on the basis of race, color, national origin, age, disability, sex, sexual orientation, or gender identity.            Thank you!     Thank you for choosing [facility]  for your care. Our goal is always to provide you with excellent care. Hearing back from our patients is one way we can continue to improve our services. Please take a few minutes to complete the written survey that you may receive in the mail after your visit with us. Thank you!             Your Updated Medication List - Protect others around you: Learn how to safely use, store and throw away your medicines at www.disposemymeds.org.          This list is accurate as of 10/29/18  5:28 PM.  Always use your most recent med list.                   Brand Name Dispense Instructions for use Diagnosis    amLODIPine 10 MG tablet    NORVASC    90 tablet    TAKE 1 TABLET(10 MG) BY MOUTH DAILY    Essential hypertension with goal blood pressure less than 140/90       carvedilol 12.5 MG tablet    COREG    60 tablet    Take 1 tablet (12.5 mg) by mouth 2 times daily (with meals)    Hypertension goal BP (blood pressure) < 140/90       FISH OIL PO      Take 1,000 mg by mouth 3 TABLETS DAILY        indomethacin 25 MG capsule    INDOCIN    30 capsule    TAKE 2 CAPSULE BY MOUTH TWICE DAILY AS NEEDED WITH MEALS    Idiopathic chronic gout of right foot without tophus       lisinopril-hydrochlorothiazide 20-12.5 MG per tablet    PRINZIDE/ZESTORETIC    60 tablet    TAKE 2 TABLETS BY MOUTH DAILY    Essential hypertension with goal blood pressure less than 140/90       TYLENOL 8 HOUR PO      Take  by mouth. As needed

## 2018-10-29 NOTE — PROGRESS NOTES
SUBJECTIVE:   Thien Zarate is a 58 year old male who presents to clinic today for the following health issues:      Blood pressure follow up    Patient states his alcohol has cut back to once every 3 weeks     He had 8-10 beers on Saturday night (2 days ago )     Current Outpatient Prescriptions   Medication Sig Dispense Refill     Acetaminophen (TYLENOL 8 HOUR PO) Take  by mouth. As needed       amLODIPine (NORVASC) 10 MG tablet TAKE 1 TABLET(10 MG) BY MOUTH DAILY 90 tablet 0     carvedilol (COREG) 6.25 MG tablet Take 1 tablet (6.25 mg) by mouth 2 times daily (with meals) 180 tablet 3     indomethacin (INDOCIN) 25 MG capsule TAKE 2 CAPSULE BY MOUTH TWICE DAILY AS NEEDED WITH MEALS 30 capsule 0     lisinopril-hydrochlorothiazide (PRINZIDE/ZESTORETIC) 20-12.5 MG per tablet TAKE 2 TABLETS BY MOUTH DAILY 60 tablet 0     Omega-3 Fatty Acids (FISH OIL PO) Take 1,000 mg by mouth 3 TABLETS DAILY       He was drinking a case every week   He does not think this was a lot       O: BP (!) 146/92 (BP Location: Right arm, Patient Position: Sitting, Cuff Size: Adult Large)  Pulse 81  Temp 97.3  F (36.3  C) (Oral)  Wt 278 lb (126.1 kg)  SpO2 96%  BMI 35.69 kg/m2    Chest wall normal to inspection and palpation. Good excursion bilaterally. Lungs clear to auscultation. Good air movement bilaterally without rales, wheezes, or rhonchi.   Regular rate and  rhythm. S1 and S2 normal, no murmurs, clicks, gallops or rubs. No edema or JVD.          ICD-10-CM    1. Hypertension goal BP (blood pressure) < 140/90 I10 carvedilol (COREG) 12.5 MG tablet     Basic metabolic panel     Told to stop drinking for 3 weeks   Increase his coreg to 12.5 2 x a day   Recheck in 3 weeks

## 2018-10-30 LAB
ANION GAP SERPL CALCULATED.3IONS-SCNC: 11 MMOL/L (ref 3–14)
BUN SERPL-MCNC: 17 MG/DL (ref 7–30)
CALCIUM SERPL-MCNC: 9.3 MG/DL (ref 8.5–10.1)
CHLORIDE SERPL-SCNC: 103 MMOL/L (ref 94–109)
CO2 SERPL-SCNC: 28 MMOL/L (ref 20–32)
CREAT SERPL-MCNC: 0.65 MG/DL (ref 0.66–1.25)
GFR SERPL CREATININE-BSD FRML MDRD: >90 ML/MIN/1.7M2
GLUCOSE SERPL-MCNC: 98 MG/DL (ref 70–99)
POTASSIUM SERPL-SCNC: 3.6 MMOL/L (ref 3.4–5.3)
SODIUM SERPL-SCNC: 142 MMOL/L (ref 133–144)

## 2018-11-02 ENCOUNTER — TRANSFERRED RECORDS (OUTPATIENT)
Dept: HEALTH INFORMATION MANAGEMENT | Facility: CLINIC | Age: 58
End: 2018-11-02

## 2018-11-02 NOTE — PROGRESS NOTES
Your basic metabolic panel which includes electrolytes,kidney function is normal and  -Glucose (diabetic screening test) is within normal limits    Follow up in 6 month(s)

## 2018-11-07 DIAGNOSIS — I10 ESSENTIAL HYPERTENSION WITH GOAL BLOOD PRESSURE LESS THAN 140/90: ICD-10-CM

## 2018-11-07 NOTE — TELEPHONE ENCOUNTER
Requested Prescriptions   Pending Prescriptions Disp Refills     lisinopril-hydrochlorothiazide (PRINZIDE/ZESTORETIC) 20-12.5 MG per tablet 60 tablet 0    There is no refill protocol information for this order   Last Written Prescription Date:  10-9-18  Last Fill Quantity: 60,  # refills: 0   Last office visit: 10/29/2018 with prescribing provider:  10-29-18   Future Office Visit:   Next 5 appointments (look out 90 days)     Nov 19, 2018  5:00 PM CST   SHORT with Frankie Kimbrough MD   [facility]    [address] [place] [id number] [phone]

## 2018-11-07 NOTE — TELEPHONE ENCOUNTER
"Requested Prescriptions   Pending Prescriptions Disp Refills     lisinopril-hydrochlorothiazide (PRINZIDE/ZESTORETIC) 20-12.5 MG per tablet 60 tablet 0    Diuretics (Including Combos) Protocol Failed    11/7/2018 10:06 AM       Failed - Blood pressure under 140/90 in past 12 months    BP Readings from Last 3 Encounters:   10/29/18 (!) 146/92   09/27/18 (!) 138/95   12/29/17 128/80                Failed - Normal serum creatinine on file in past 12 months    Recent Labs   Lab Test  10/29/18   1731   CR  0.65*             Passed - Recent (12 mo) or future (30 days) visit within the authorizing provider's specialty    Patient had office visit in the last 12 months or has a visit in the next 30 days with authorizing provider or within the authorizing provider's specialty.  See \"Patient Info\" tab in inbasket, or \"Choose Columns\" in Meds & Orders section of the refill encounter.             Passed - Patient is age 18 or older       Passed - Normal serum potassium on file in past 12 months    Recent Labs   Lab Test  10/29/18   1731   POTASSIUM  3.6                   Passed - Normal serum sodium on file in past 12 months    Recent Labs   Lab Test  10/29/18   1731   NA  142                "

## 2018-11-07 NOTE — TELEPHONE ENCOUNTER
"Requested Prescriptions   Pending Prescriptions Disp Refills     lisinopril-hydrochlorothiazide (PRINZIDE/ZESTORETIC) 20-12.5 MG per tablet [Pharmacy Med Name: LISINOPRIL-HCTZ 20/12.5MG TABLETS] 60 tablet 0     Sig: TAKE 2 TABLETS BY MOUTH DAILY    Diuretics (Including Combos) Protocol Failed    11/7/2018  2:54 PM       Failed - Blood pressure under 140/90 in past 12 months    BP Readings from Last 3 Encounters:   10/29/18 (!) 146/92   09/27/18 (!) 138/95   12/29/17 128/80                Failed - Normal serum creatinine on file in past 12 months    Recent Labs   Lab Test  10/29/18   1731   CR  0.65*             Passed - Recent (12 mo) or future (30 days) visit within the authorizing provider's specialty    Patient had office visit in the last 12 months or has a visit in the next 30 days with authorizing provider or within the authorizing provider's specialty.  See \"Patient Info\" tab in inbasket, or \"Choose Columns\" in Meds & Orders section of the refill encounter.             Passed - Patient is age 18 or older       Passed - Normal serum potassium on file in past 12 months    Recent Labs   Lab Test  10/29/18   1731   POTASSIUM  3.6                   Passed - Normal serum sodium on file in past 12 months    Recent Labs   Lab Test  10/29/18   1731   NA  142                "

## 2018-11-07 NOTE — TELEPHONE ENCOUNTER
Routing refill request to provider for review/approval because:  RX Protocol Failed.  Please review refill.  Thank you.  Crystal Mccollum RN

## 2018-11-08 RX ORDER — LISINOPRIL AND HYDROCHLOROTHIAZIDE 12.5; 2 MG/1; MG/1
TABLET ORAL
Qty: 60 TABLET | Refills: 0 | Status: SHIPPED | OUTPATIENT
Start: 2018-11-08 | End: 2018-12-10

## 2018-11-08 RX ORDER — LISINOPRIL AND HYDROCHLOROTHIAZIDE 12.5; 2 MG/1; MG/1
TABLET ORAL
Qty: 60 TABLET | Refills: 0 | OUTPATIENT
Start: 2018-11-08

## 2018-11-09 NOTE — TELEPHONE ENCOUNTER
Patient was supposed to come in for a recheck bp and he did not come back.  Please call and have reschedule an appointment on meds   See last note

## 2018-11-19 ENCOUNTER — OFFICE VISIT (OUTPATIENT)
Dept: FAMILY MEDICINE | Facility: CLINIC | Age: 58
End: 2018-11-19
Payer: COMMERCIAL

## 2018-11-19 VITALS
BODY MASS INDEX: 35.18 KG/M2 | WEIGHT: 274 LBS | OXYGEN SATURATION: 97 % | TEMPERATURE: 96.7 F | HEART RATE: 70 BPM | DIASTOLIC BLOOD PRESSURE: 82 MMHG | SYSTOLIC BLOOD PRESSURE: 124 MMHG

## 2018-11-19 DIAGNOSIS — I10 HYPERTENSION GOAL BP (BLOOD PRESSURE) < 140/90: Primary | ICD-10-CM

## 2018-11-19 DIAGNOSIS — G89.29 CHRONIC PAIN OF LEFT KNEE: ICD-10-CM

## 2018-11-19 DIAGNOSIS — M25.562 CHRONIC PAIN OF LEFT KNEE: ICD-10-CM

## 2018-11-19 PROCEDURE — 99213 OFFICE O/P EST LOW 20 MIN: CPT | Performed by: FAMILY MEDICINE

## 2018-11-19 NOTE — PROGRESS NOTES
SUBJECTIVE:   Thien Zarate is a 58 year old male who presents to clinic today for the following health issues:      Blood pressure and Lab results    Patient did not get his results   He is not using his computer but he is willing to try again     Current Outpatient Prescriptions   Medication Sig Dispense Refill     Acetaminophen (TYLENOL 8 HOUR PO) Take  by mouth. As needed       amLODIPine (NORVASC) 10 MG tablet TAKE 1 TABLET(10 MG) BY MOUTH DAILY 90 tablet 0     carvedilol (COREG) 12.5 MG tablet Take 1 tablet (12.5 mg) by mouth 2 times daily (with meals) 60 tablet 1     lisinopril-hydrochlorothiazide (PRINZIDE/ZESTORETIC) 20-12.5 MG per tablet TAKE 2 TABLETS BY MOUTH DAILY 60 tablet 0     Omega-3 Fatty Acids (FISH OIL PO) Take 1,000 mg by mouth 3 TABLETS DAILY       indomethacin (INDOCIN) 25 MG capsule TAKE 2 CAPSULE BY MOUTH TWICE DAILY AS NEEDED WITH MEALS (Patient not taking: Reported on 11/19/2018) 30 capsule 0     Has not taken the indomethacin lately     Patient has recurrent left knee pain   It is swollen   He is supposed to be seen by Ortho   It is getting worse with cold weather   He has an MRI scheduled   Patient has had cortisone injections       O: /82  Pulse 70  Temp 96.7  F (35.9  C) (Oral)  Wt 274 lb (124.3 kg)  SpO2 97%  BMI 35.18 kg/m2    Repeat bp was good     Chest wall normal to inspection and palpation. Good excursion bilaterally. Lungs clear to auscultation. Good air movement bilaterally without rales, wheezes, or rhonchi.   Regular rate and  rhythm. S1 and S2 normal, no murmurs, clicks, gallops or rubs. No edema or JVD.      ICD-10-CM    1. Hypertension goal BP (blood pressure) < 140/90 I10    2. Chronic pain of left knee M25.562     G89.29      Recheck 6 months

## 2018-11-19 NOTE — MR AVS SNAPSHOT
After Visit Summary   11/19/2018    Thien Zarate    MRN: [id number]           Patient Information     Date Of Birth          1960        Visit Information        Provider Department      11/19/2018 5:00 PM Frankie Kimbrough MD [facility]        Today's Diagnoses     Hypertension goal BP (blood pressure) < 140/90    -  1    Chronic pain of left knee           Follow-ups after your visit        Follow-up notes from your care team     Return in about 6 months (around 5/19/2019) for BP Recheck.      Who to contact     If you have questions or need follow up information about today's clinic visit or your schedule please contact [facility] directly at [phone].  Normal or non-critical lab and imaging results will be communicated to you by [place], letter or phone within 4 business days after the clinic has received the results. If you do not hear from us within 7 days, please contact the clinic through [place] or phone. If you have a critical or abnormal lab result, we will notify you by phone as soon as possible.  Submit refill requests through [place] or call your pharmacy and they will forward the refill request to us. Please allow 3 business days for your refill to be completed.          Additional Information About Your Visit        MyChart Information     [place] gives you secure access to your electronic health record. If you see a primary care provider, you can also send messages to your care team and make appointments. If you have questions, please call your primary care clinic.  If you do not have a primary care provider, please call [phone] and they will assist you.        Care EveryWhere ID     This is your Care EveryWhere ID. This could be used by other organizations to access your [facility] medical records  [id number]        Your Vitals Were     Pulse Temperature Pulse Oximetry BMI (Body Mass Index)          70 96.7  F (35.9   C) (Oral) 97% 35.18 kg/m2         Blood Pressure from Last 3 Encounters:   11/19/18 124/82   10/29/18 (!) 146/92   09/27/18 (!) 138/95    Weight from Last 3 Encounters:   11/19/18 274 lb (124.3 kg)   10/29/18 278 lb (126.1 kg)   09/27/18 267 lb (121.1 kg)              Today, you had the following     No orders found for display       Primary Care Provider Office Phone # Fax #    Frankie Kimbrough [phone]       [address] [facility] [address]        Equal Access to Services     ANKITA MARTINEZ : Lenin Cedeño, waaxda kika, qaybta kaalmada jena, maximo day . So [facility] [phone].    ATENCIÓN: Si habla español, tiene a gray disposición servicios gratuitos de asistencia lingüística. Llame al [phone].    We comply with applicable federal civil rights laws and [place] laws. We do not discriminate on the basis of race, color, national origin, age, disability, sex, sexual orientation, or gender identity.            Thank you!     Thank you for choosing [facility]  for your care. Our goal is always to provide you with excellent care. Hearing back from our patients is one way we can continue to improve our services. Please take a few minutes to complete the written survey that you may receive in the mail after your visit with us. Thank you!             Your Updated Medication List - Protect others around you: Learn how to safely use, store and throw away your medicines at www.disposemymeds.org.          This list is accurate as of 11/19/18  5:29 PM.  Always use your most recent med list.                   Brand Name Dispense Instructions for use Diagnosis    amLODIPine 10 MG tablet    NORVASC    90 tablet    TAKE 1 TABLET(10 MG) BY MOUTH DAILY    Essential hypertension with goal blood pressure less than 140/90       carvedilol 12.5 MG tablet    COREG    60 tablet    Take 1 tablet (12.5 mg) by mouth 2 times  daily (with meals)    Hypertension goal BP (blood pressure) < 140/90       FISH OIL PO      Take 1,000 mg by mouth 3 TABLETS DAILY        indomethacin 25 MG capsule    INDOCIN    30 capsule    TAKE 2 CAPSULE BY MOUTH TWICE DAILY AS NEEDED WITH MEALS    Idiopathic chronic gout of right foot without tophus       lisinopril-hydrochlorothiazide 20-12.5 MG per tablet    PRINZIDE/ZESTORETIC    60 tablet    TAKE 2 TABLETS BY MOUTH DAILY    Essential hypertension with goal blood pressure less than 140/90       TYLENOL 8 HOUR PO      Take  by mouth. As needed

## 2018-12-08 DIAGNOSIS — I10 ESSENTIAL HYPERTENSION WITH GOAL BLOOD PRESSURE LESS THAN 140/90: ICD-10-CM

## 2018-12-10 DIAGNOSIS — I10 ESSENTIAL HYPERTENSION WITH GOAL BLOOD PRESSURE LESS THAN 140/90: ICD-10-CM

## 2018-12-10 RX ORDER — AMLODIPINE BESYLATE 10 MG/1
TABLET ORAL
Qty: 90 TABLET | Refills: 0 | Status: SHIPPED | OUTPATIENT
Start: 2018-12-10 | End: 2019-03-09

## 2018-12-10 NOTE — TELEPHONE ENCOUNTER
"Requested Prescriptions   Pending Prescriptions Disp Refills     amLODIPine (NORVASC) 10 MG tablet [Pharmacy Med Name: AMLODIPINE BESYLATE 10MG TABLETS]  Last Written Prescription Date:  9/11/18  Last Fill Quantity: 90,  # refills: 0   Last office visit: 11/19/2018 with prescribing provider:  Kaylan   Future Office Visit:     90 tablet 0     Sig: TAKE 1 TABLET(10 MG) BY MOUTH DAILY    Calcium Channel Blockers Protocol  Failed - 12/8/2018  3:46 AM       Failed - Normal serum creatinine on file in past 12 months    Recent Labs   Lab Test 10/29/18  1731   CR 0.65*            Passed - Blood pressure under 140/90 in past 12 months    BP Readings from Last 3 Encounters:   11/19/18 124/82   10/29/18 (!) 146/92   09/27/18 (!) 138/95                Passed - Recent (12 mo) or future (30 days) visit within the authorizing provider's specialty    Patient had office visit in the last 12 months or has a visit in the next 30 days with authorizing provider or within the authorizing provider's specialty.  See \"Patient Info\" tab in inbasket, or \"Choose Columns\" in Meds & Orders section of the refill encounter.             Passed - Patient is age 18 or older          "

## 2018-12-10 NOTE — TELEPHONE ENCOUNTER
Routing refill request to provider for review/approval because:  Labs out of range:  Creatinine      Tiffany Rowland RN

## 2018-12-10 NOTE — TELEPHONE ENCOUNTER
"Requested Prescriptions   Pending Prescriptions Disp Refills     lisinopril-hydrochlorothiazide (PRINZIDE/ZESTORETIC) 20-12.5 MG tablet [Pharmacy Med Name: LISINOPRIL-HCTZ 20/12.5MG TABLETS]  Last Written Prescription Date:  11/8/18  Last Fill Quantity: 60,  # refills: 0   Last office visit: 11/19/2018 with prescribing provider:  Kaylan   Future Office Visit:     60 tablet 0     Sig: TAKE 2 TABLETS BY MOUTH DAILY    Diuretics (Including Combos) Protocol Failed - 12/10/2018  2:53 PM       Failed - Normal serum creatinine on file in past 12 months    Recent Labs   Lab Test 10/29/18  1731   CR 0.65*             Passed - Blood pressure under 140/90 in past 12 months    BP Readings from Last 3 Encounters:   11/19/18 124/82   10/29/18 (!) 146/92   09/27/18 (!) 138/95                Passed - Recent (12 mo) or future (30 days) visit within the authorizing provider's specialty    Patient had office visit in the last 12 months or has a visit in the next 30 days with authorizing provider or within the authorizing provider's specialty.  See \"Patient Info\" tab in inbasket, or \"Choose Columns\" in Meds & Orders section of the refill encounter.             Passed - Patient is age 18 or older       Passed - Normal serum potassium on file in past 12 months    Recent Labs   Lab Test 10/29/18  1731   POTASSIUM 3.6                   Passed - Normal serum sodium on file in past 12 months    Recent Labs   Lab Test 10/29/18  1731                   "

## 2018-12-13 RX ORDER — LISINOPRIL AND HYDROCHLOROTHIAZIDE 12.5; 2 MG/1; MG/1
TABLET ORAL
Qty: 60 TABLET | Refills: 0 | Status: SHIPPED | OUTPATIENT
Start: 2018-12-13 | End: 2019-01-13

## 2019-01-04 DIAGNOSIS — I10 HYPERTENSION GOAL BP (BLOOD PRESSURE) < 140/90: ICD-10-CM

## 2019-01-04 NOTE — TELEPHONE ENCOUNTER
"Requested Prescriptions   Pending Prescriptions Disp Refills     carvedilol (COREG) 3.125 MG tablet [Pharmacy Med Name: CARVEDILOL 3.125MG TABLETS] 180 tablet 0    Last Written Prescription Date:  10/29/18  Last Fill Quantity: 60,  # refills: 1   Last office visit: 11/19/2018 with prescribing provider:     Future Office Visit:     Sig: TAKE 1 TABLET BY MOUTH TWICE DAILY(WITH MEALS)    Beta-Blockers Protocol Passed - 1/4/2019  3:41 PM       Passed - Blood pressure under 140/90 in past 12 months    BP Readings from Last 3 Encounters:   11/19/18 124/82   10/29/18 (!) 146/92   09/27/18 (!) 138/95                Passed - Patient is age 6 or older       Passed - Recent (12 mo) or future (30 days) visit within the authorizing provider's specialty    Patient had office visit in the last 12 months or has a visit in the next 30 days with authorizing provider or within the authorizing provider's specialty.  See \"Patient Info\" tab in inbasket, or \"Choose Columns\" in Meds & Orders section of the refill encounter.             Passed - Medication is active on med list          "

## 2019-01-07 NOTE — TELEPHONE ENCOUNTER
Routing refill request to provider for review/approval because clarify prescription:  Verify dose / directions:    carvedilol (COREG) 12.5 MG tablet 60 tablet 1 10/29/2018  --   Sig - Route: Take 1 tablet (12.5 mg) by mouth 2 times daily (with meals) - Oral     Route to PCP    Renetta Madden RN

## 2019-01-08 RX ORDER — CARVEDILOL 3.12 MG/1
TABLET ORAL
Qty: 180 TABLET | Refills: 0 | Status: SHIPPED | OUTPATIENT
Start: 2019-01-08 | End: 2019-01-31 | Stop reason: DRUGHIGH

## 2019-01-13 DIAGNOSIS — I10 ESSENTIAL HYPERTENSION WITH GOAL BLOOD PRESSURE LESS THAN 140/90: ICD-10-CM

## 2019-01-14 RX ORDER — LISINOPRIL AND HYDROCHLOROTHIAZIDE 12.5; 2 MG/1; MG/1
TABLET ORAL
Qty: 60 TABLET | Refills: 0 | Status: SHIPPED | OUTPATIENT
Start: 2019-01-14 | End: 2019-02-10

## 2019-01-14 NOTE — TELEPHONE ENCOUNTER
"Requested Prescriptions   Pending Prescriptions Disp Refills     lisinopril-hydrochlorothiazide (PRINZIDE/ZESTORETIC) 20-12.5 MG tablet [Pharmacy Med Name: LISINOPRIL-HCTZ 20/12.5MG TABLETS] 60 tablet 0    Last Written Prescription Date:  12/13/18  Last Fill Quantity: 60,  # refills: 0   Last office visit: 11/19/2018 with prescribing provider:     Future Office Visit:     Sig: TAKE 2 TABLETS BY MOUTH DAILY    Diuretics (Including Combos) Protocol Failed - 1/13/2019 12:30 PM       Failed - Normal serum creatinine on file in past 12 months    Recent Labs   Lab Test 10/29/18  1731   CR 0.65*             Passed - Blood pressure under 140/90 in past 12 months    BP Readings from Last 3 Encounters:   11/19/18 124/82   10/29/18 (!) 146/92   09/27/18 (!) 138/95                Passed - Recent (12 mo) or future (30 days) visit within the authorizing provider's specialty    Patient had office visit in the last 12 months or has a visit in the next 30 days with authorizing provider or within the authorizing provider's specialty.  See \"Patient Info\" tab in inbasket, or \"Choose Columns\" in Meds & Orders section of the refill encounter.             Passed - Medication is active on med list       Passed - Patient is age 18 or older       Passed - Normal serum potassium on file in past 12 months    Recent Labs   Lab Test 10/29/18  1731   POTASSIUM 3.6                   Passed - Normal serum sodium on file in past 12 months    Recent Labs   Lab Test 10/29/18  1731                   "

## 2019-01-31 DIAGNOSIS — I10 HYPERTENSION GOAL BP (BLOOD PRESSURE) < 140/90: ICD-10-CM

## 2019-01-31 RX ORDER — CARVEDILOL 12.5 MG/1
TABLET ORAL
Qty: 60 TABLET | Refills: 2 | Status: SHIPPED | OUTPATIENT
Start: 2019-01-31 | End: 2019-05-04

## 2019-01-31 NOTE — TELEPHONE ENCOUNTER
"Requested Prescriptions   Pending Prescriptions Disp Refills     carvedilol (COREG) 12.5 MG tablet [Pharmacy Med Name: CARVEDILOL 12.5MG TABLETS] 60 tablet 0    Last Written Prescription Date:  1-8-19  Last Fill Quantity: 180,  # refills: 0   Last office visit: 11/19/2018 with prescribing provider:  11-19-18   Future Office Visit:     Sig: TAKE 1 TABLET(12.5 MG) BY MOUTH TWICE DAILY WITH MEALS    Beta-Blockers Protocol Passed - 1/31/2019  3:46 AM       Passed - Blood pressure under 140/90 in past 12 months    BP Readings from Last 3 Encounters:   11/19/18 124/82   10/29/18 (!) 146/92   09/27/18 (!) 138/95                Passed - Patient is age 6 or older       Passed - Recent (12 mo) or future (30 days) visit within the authorizing provider's specialty    Patient had office visit in the last 12 months or has a visit in the next 30 days with authorizing provider or within the authorizing provider's specialty.  See \"Patient Info\" tab in inbasket, or \"Choose Columns\" in Meds & Orders section of the refill encounter.             Passed - Medication is active on med list          "

## 2019-01-31 NOTE — TELEPHONE ENCOUNTER
There are 2 different strengths of coreg on Epic med list, 12.5 BID and 3.125 BID.  Is he on both?    Patient was last seen 11/19/18.    See plan:          Instructions         Return in about 6 months (around 5/19/2019) for BP Recheck.       I see the Rx for the 3.125 was routed to provider earlier this month to clarify the dosing.   Rx was sent but no explanation, both still on list.  This is a request for the 12.5 mg strength.    Which is he on?  Both?    Attempted to call patient at home/mobile number, left message on voicemail; patient was instructed to return call to [facility] RN directly on the RN call back line at [phone]   Anastacia Ulrich, JAMEL  [facility]

## 2019-01-31 NOTE — TELEPHONE ENCOUNTER
Patient returned call, he states the 3.125 was stopped and he is on 12.5 mg BID now.   He says he did NOT  the 3.125 mg sent earlier this month as he knew it was wrong.      12.5 mg BID Prescription approved per [facility] Refill Protocol.    Patient due to be seen again in May, he was reminded of that in this call as well.    Anastacia Ulrich RN  [facility]

## 2019-02-10 DIAGNOSIS — I10 ESSENTIAL HYPERTENSION WITH GOAL BLOOD PRESSURE LESS THAN 140/90: ICD-10-CM

## 2019-02-11 NOTE — TELEPHONE ENCOUNTER
"Requested Prescriptions   Pending Prescriptions Disp Refills     lisinopril-hydrochlorothiazide (PRINZIDE/ZESTORETIC) 20-12.5 MG tablet [Pharmacy Med Name: LISINOPRIL-HCTZ 20/12.5MG TABLETS] 60 tablet 0    Last Written Prescription Date:  1-14-19  Last Fill Quantity: 60,  # refills: 0   Last office visit: 11/19/2018 with prescribing provider:  11-19-18   Future Office Visit:     Sig: TAKE 2 TABLETS BY MOUTH DAILY    Diuretics (Including Combos) Protocol Failed - 2/10/2019  5:58 PM       Failed - Normal serum creatinine on file in past 12 months    Recent Labs   Lab Test 10/29/18  1731   CR 0.65*             Passed - Blood pressure under 140/90 in past 12 months    BP Readings from Last 3 Encounters:   11/19/18 124/82   10/29/18 (!) 146/92   09/27/18 (!) 138/95                Passed - Recent (12 mo) or future (30 days) visit within the authorizing provider's specialty    Patient had office visit in the last 12 months or has a visit in the next 30 days with authorizing provider or within the authorizing provider's specialty.  See \"Patient Info\" tab in inbasket, or \"Choose Columns\" in Meds & Orders section of the refill encounter.             Passed - Medication is active on med list       Passed - Patient is age 18 or older       Passed - Normal serum potassium on file in past 12 months    Recent Labs   Lab Test 10/29/18  1731   POTASSIUM 3.6                   Passed - Normal serum sodium on file in past 12 months    Recent Labs   Lab Test 10/29/18  1731                   "

## 2019-02-12 RX ORDER — LISINOPRIL AND HYDROCHLOROTHIAZIDE 12.5; 2 MG/1; MG/1
TABLET ORAL
Qty: 60 TABLET | Refills: 1 | Status: SHIPPED | OUTPATIENT
Start: 2019-02-12 | End: 2019-04-15

## 2019-02-12 NOTE — TELEPHONE ENCOUNTER
Routing refill request to provider for review/approval because:  Labs out of range  Yamile Perze, JAMEL [facility] Triage.

## 2019-03-09 DIAGNOSIS — I10 ESSENTIAL HYPERTENSION WITH GOAL BLOOD PRESSURE LESS THAN 140/90: ICD-10-CM

## 2019-03-11 NOTE — TELEPHONE ENCOUNTER
"Requested Prescriptions   Pending Prescriptions Disp Refills     amLODIPine (NORVASC) 10 MG tablet [Pharmacy Med Name: AMLODIPINE BESYLATE 10MG TABLETS] 90 tablet 0    Last Written Prescription Date:  12/10/18  Last Fill Quantity: 90,  # refills: 0   Last office visit: 11/19/2018 with prescribing provider:     Future Office Visit:   Next 5 appointments (look out 90 days)    May 20, 2019  5:00 PM CDT  SHORT with Frankie Kimbrough MD  [facility] [address] [place] [id number] [phone]          Sig: TAKE 1 TABLET(10 MG) BY MOUTH DAILY    Calcium Channel Blockers Protocol  Failed - 3/9/2019  3:46 AM       Failed - Normal serum creatinine on file in past 12 months    Recent Labs   Lab Test 10/29/18  1731   CR 0.65*            Passed - Blood pressure under 140/90 in past 12 months    BP Readings from Last 3 Encounters:   11/19/18 124/82   10/29/18 (!) 146/92   09/27/18 (!) 138/95                Passed - Recent (12 mo) or future (30 days) visit within the authorizing provider's specialty    Patient had office visit in the last 12 months or has a visit in the next 30 days with authorizing provider or within the authorizing provider's specialty.  See \"Patient Info\" tab in inbasket, or \"Choose Columns\" in Meds & Orders section of the refill encounter.             Passed - Medication is active on med list       Passed - Patient is age 18 or older          "

## 2019-03-12 RX ORDER — AMLODIPINE BESYLATE 10 MG/1
TABLET ORAL
Qty: 90 TABLET | Refills: 0 | Status: SHIPPED | OUTPATIENT
Start: 2019-03-12 | End: 2019-04-15

## 2019-04-15 DIAGNOSIS — I10 ESSENTIAL HYPERTENSION WITH GOAL BLOOD PRESSURE LESS THAN 140/90: ICD-10-CM

## 2019-04-15 NOTE — TELEPHONE ENCOUNTER
"Requested Prescriptions   Pending Prescriptions Disp Refills     lisinopril-hydrochlorothiazide (PRINZIDE/ZESTORETIC) 20-12.5 MG tablet [Pharmacy Med Name: LISINOPRIL-HCTZ 20/12.5MG TABLETS] 60 tablet 0     Sig: TAKE 2 TABLETS BY MOUTH DAILY   Last Written Prescription Date:  2-12-19  Last Fill Quantity: 60,  # refills: 1   Last office visit: 11/19/2018 with prescribing provider:  11-19-18   Future Office Visit:   Next 5 appointments (look out 90 days)    May 20, 2019  5:00 PM CDT  SHORT with Frankie Kimbrough MD  [facility]) [address] [place] [id number] [phone]             Diuretics (Including Combos) Protocol Failed - 4/15/2019  6:33 PM        Failed - Normal serum creatinine on file in past 12 months     Recent Labs   Lab Test 10/29/18  1731   CR 0.65*              Passed - Blood pressure under 140/90 in past 12 months     BP Readings from Last 3 Encounters:   11/19/18 124/82   10/29/18 (!) 146/92   09/27/18 (!) 138/95                 Passed - Recent (12 mo) or future (30 days) visit within the authorizing provider's specialty     Patient had office visit in the last 12 months or has a visit in the next 30 days with authorizing provider or within the authorizing provider's specialty.  See \"Patient Info\" tab in inbasket, or \"Choose Columns\" in Meds & Orders section of the refill encounter.              Passed - Medication is active on med list        Passed - Patient is age 18 or older        Passed - Normal serum potassium on file in past 12 months     Recent Labs   Lab Test 10/29/18  1731   POTASSIUM 3.6                    Passed - Normal serum sodium on file in past 12 months     Recent Labs   Lab Test 10/29/18  1731                 amLODIPine (NORVASC) 10 MG tablet [Pharmacy Med Name: AMLODIPINE BESYLATE 10MG TABLETS] 90 tablet 0     Sig: TAKE 1 TABLET(10 MG) BY MOUTH DAILY   Last Written Prescription " "Date:  3-12-19  Last Fill Quantity: 90,  # refills: 0   Last office visit: 11/19/2018 with prescribing provider:     Future Office Visit:   Next 5 appointments (look out 90 days)    May 20, 2019  5:00 PM CDT  SHORT with Frankie Kimbrough MD  [facility]) [address] [facility] [address]  [phone]             Calcium Channel Blockers Protocol  Failed - 4/15/2019  6:33 PM        Failed - Normal serum creatinine on file in past 12 months     Recent Labs   Lab Test 10/29/18  1731   CR 0.65*             Passed - Blood pressure under 140/90 in past 12 months     BP Readings from Last 3 Encounters:   11/19/18 124/82   10/29/18 (!) 146/92   09/27/18 (!) 138/95                 Passed - Recent (12 mo) or future (30 days) visit within the authorizing provider's specialty     Patient had office visit in the last 12 months or has a visit in the next 30 days with authorizing provider or within the authorizing provider's specialty.  See \"Patient Info\" tab in inbasket, or \"Choose Columns\" in Meds & Orders section of the refill encounter.              Passed - Medication is active on med list        Passed - Patient is age 18 or older          "

## 2019-04-16 RX ORDER — AMLODIPINE BESYLATE 10 MG/1
TABLET ORAL
Qty: 90 TABLET | Refills: 0 | Status: SHIPPED | OUTPATIENT
Start: 2019-04-16 | End: 2019-06-10

## 2019-04-16 RX ORDER — LISINOPRIL AND HYDROCHLOROTHIAZIDE 12.5; 2 MG/1; MG/1
TABLET ORAL
Qty: 60 TABLET | Refills: 0 | Status: SHIPPED | OUTPATIENT
Start: 2019-04-16 | End: 2019-05-13

## 2019-04-22 ENCOUNTER — TRANSFERRED RECORDS (OUTPATIENT)
Dept: HEALTH INFORMATION MANAGEMENT | Facility: CLINIC | Age: 59
End: 2019-04-22

## 2019-05-04 DIAGNOSIS — I10 HYPERTENSION GOAL BP (BLOOD PRESSURE) < 140/90: ICD-10-CM

## 2019-05-05 DIAGNOSIS — I10 HYPERTENSION GOAL BP (BLOOD PRESSURE) < 140/90: ICD-10-CM

## 2019-05-06 RX ORDER — CARVEDILOL 12.5 MG/1
TABLET ORAL
Qty: 60 TABLET | Refills: 0 | Status: SHIPPED | OUTPATIENT
Start: 2019-05-06 | End: 2019-05-20

## 2019-05-06 NOTE — TELEPHONE ENCOUNTER
See plan at last visit with PCP on 11/19/18:                Instructions         Return in about 6 months (around 5/19/2019) for BP Recheck.     Patient is scheduled for 5/20/19.      Medication is being filled for 1 time refill only due to:  Patient needs to be seen because due per plan.      Anastacia Ulrich RN  [facility]

## 2019-05-06 NOTE — TELEPHONE ENCOUNTER
"Requested Prescriptions   Pending Prescriptions Disp Refills     carvedilol (COREG) 12.5 MG tablet [Pharmacy Med Name: CARVEDILOL 12.5MG TABLETS] 60 tablet 0     Sig: TAKE 1 TABLET(12.5 MG) BY MOUTH TWICE DAILY WITH MEALS   Last Written Prescription Date:  1/31/19  Last Fill Quantity: 60,  # refills: 2   Last office visit: 11/19/2018 with prescribing provider:     Future Office Visit:   Next 5 appointments (look out 90 days)    May 20, 2019  5:00 PM CDT  SHORT with Frankie Kimbrough MD  [facility] ([facility]) [address] [place] [id number]  [phone]             Beta-Blockers Protocol Passed - 5/5/2019 11:35 AM        Passed - Blood pressure under 140/90 in past 12 months     BP Readings from Last 3 Encounters:   11/19/18 124/82   10/29/18 (!) 146/92   09/27/18 (!) 138/95                 Passed - Patient is age 6 or older        Passed - Recent (12 mo) or future (30 days) visit within the authorizing provider's specialty     Patient had office visit in the last 12 months or has a visit in the next 30 days with authorizing provider or within the authorizing provider's specialty.  See \"Patient Info\" tab in inbasket, or \"Choose Columns\" in Meds & Orders section of the refill encounter.              Passed - Medication is active on med list          "

## 2019-05-06 NOTE — TELEPHONE ENCOUNTER
"Requested Prescriptions   Pending Prescriptions Disp Refills     carvedilol (COREG) 12.5 MG tablet [Pharmacy Med Name: CARVEDILOL 12.5MG TABLETS] 60 tablet 0     Sig: TAKE 1 TABLET(12.5 MG) BY MOUTH TWICE DAILY WITH MEALS       Last Written Prescription Date:  1/31/19  Last Fill Quantity: 60,  # refills: 2   Last office visit: 11/19/2018 with prescribing provider:  11/19/18   Future Office Visit:   Next 5 appointments (look out 90 days)    May 20, 2019  5:00 PM CDT  SHORT with Frankie Kimbrough MD  [facility] [address] [facility] [address]  [phone]             Beta-Blockers Protocol Passed - 5/4/2019  3:47 AM        Passed - Blood pressure under 140/90 in past 12 months     BP Readings from Last 3 Encounters:   11/19/18 124/82   10/29/18 (!) 146/92   09/27/18 (!) 138/95                 Passed - Patient is age 6 or older        Passed - Recent (12 mo) or future (30 days) visit within the authorizing provider's specialty     Patient had office visit in the last 12 months or has a visit in the next 30 days with authorizing provider or within the authorizing provider's specialty.  See \"Patient Info\" tab in inbasket, or \"Choose Columns\" in Meds & Orders section of the refill encounter.              Passed - Medication is active on med list          "

## 2019-05-07 RX ORDER — CARVEDILOL 12.5 MG/1
TABLET ORAL
Qty: 60 TABLET | Refills: 0 | OUTPATIENT
Start: 2019-05-07

## 2019-05-07 NOTE — TELEPHONE ENCOUNTER
"Duplicate.    \"Refusal\" routed back to pharmacy with note.    Anastacia Ulrich RN  [facility]      "

## 2019-05-13 DIAGNOSIS — I10 ESSENTIAL HYPERTENSION WITH GOAL BLOOD PRESSURE LESS THAN 140/90: ICD-10-CM

## 2019-05-14 NOTE — TELEPHONE ENCOUNTER
"Requested Prescriptions   Pending Prescriptions Disp Refills     lisinopril-hydrochlorothiazide (PRINZIDE/ZESTORETIC) 20-12.5 MG tablet [Pharmacy Med Name: LISINOPRIL-HCTZ 20/12.5MG TABLETS] 60 tablet 0     Sig: TAKE 2 TABLETS BY MOUTH DAILY   Last Written Prescription Date:  4/16/19  Last Fill Quantity: 60,  # refills: 0   Last office visit: 11/19/2018 with prescribing provider:     Future Office Visit:   Next 5 appointments (look out 90 days)    May 20, 2019  5:00 PM CDT  SHORT with Frankie Kimbrough MD  [facility] [address] [place] [id number] [phone]             Diuretics (Including Combos) Protocol Failed - 5/13/2019  6:46 PM        Failed - Normal serum creatinine on file in past 12 months     Recent Labs   Lab Test 10/29/18  1731   CR 0.65*              Passed - Blood pressure under 140/90 in past 12 months     BP Readings from Last 3 Encounters:   11/19/18 124/82   10/29/18 (!) 146/92   09/27/18 (!) 138/95                 Passed - Recent (12 mo) or future (30 days) visit within the authorizing provider's specialty     Patient had office visit in the last 12 months or has a visit in the next 30 days with authorizing provider or within the authorizing provider's specialty.  See \"Patient Info\" tab in inbasket, or \"Choose Columns\" in Meds & Orders section of the refill encounter.              Passed - Medication is active on med list        Passed - Patient is age 18 or older        Passed - Normal serum potassium on file in past 12 months     Recent Labs   Lab Test 10/29/18  1731   POTASSIUM 3.6                    Passed - Normal serum sodium on file in past 12 months     Recent Labs   Lab Test 10/29/18  1731                   "

## 2019-05-17 RX ORDER — LISINOPRIL AND HYDROCHLOROTHIAZIDE 12.5; 2 MG/1; MG/1
TABLET ORAL
Qty: 60 TABLET | Refills: 0 | Status: SHIPPED | OUTPATIENT
Start: 2019-05-17 | End: 2019-06-10

## 2019-05-20 ENCOUNTER — OFFICE VISIT (OUTPATIENT)
Dept: FAMILY MEDICINE | Facility: CLINIC | Age: 59
End: 2019-05-20
Payer: COMMERCIAL

## 2019-05-20 VITALS
OXYGEN SATURATION: 98 % | SYSTOLIC BLOOD PRESSURE: 141 MMHG | WEIGHT: 268 LBS | HEART RATE: 70 BPM | HEIGHT: 73 IN | BODY MASS INDEX: 35.52 KG/M2 | TEMPERATURE: 97.4 F | DIASTOLIC BLOOD PRESSURE: 92 MMHG

## 2019-05-20 DIAGNOSIS — I10 HYPERTENSION GOAL BP (BLOOD PRESSURE) < 140/90: ICD-10-CM

## 2019-05-20 PROCEDURE — 99213 OFFICE O/P EST LOW 20 MIN: CPT | Performed by: FAMILY MEDICINE

## 2019-05-20 RX ORDER — CARVEDILOL 25 MG/1
25 TABLET ORAL 2 TIMES DAILY WITH MEALS
Qty: 60 TABLET | Refills: 1 | Status: SHIPPED | OUTPATIENT
Start: 2019-05-20 | End: 2019-07-20

## 2019-05-20 ASSESSMENT — MIFFLIN-ST. JEOR: SCORE: 2094.52

## 2019-05-20 NOTE — PROGRESS NOTES
"Subjective     Thien Zarate is a 58 year old male who presents to clinic today for the following health issues:    HPI   Blood pressure follow up    He had an injury to the right knee in the past   He had a menisectomy on the right   He was to have a  knee replacement on the right   He needed a second opinion     As a result he did not get his surgery   He has been out of work   He was denied, saying that his knee injury was due to weight and age    He started to have problems on his left knee due to using it more     Initial injury was from a fall when he was getting out of his truck and slipped on a mat that was torn and sticking out   This was the right knee and in 2011.      They did take fluid out of it   They gave him steroids   He was told that they are bone on bone on right   The left one is a little better     They denied him FMLA         Reviewed and updated as needed this visit by Provider         Review of Systems         Objective    BP (!) 141/92 (BP Location: Right arm, Patient Position: Sitting, Cuff Size: Adult Large)   Pulse 70   Temp 97.4  F (36.3  C) (Oral)   Ht 1.854 m (6' 1\")   Wt 121.6 kg (268 lb)   SpO2 98%   BMI 35.36 kg/m    Body mass index is 35.36 kg/m .  Physical Exam         ICD-10-CM    1. Hypertension goal BP (blood pressure) < 140/90 I10 carvedilol (COREG) 25 MG tablet             "

## 2019-06-05 DIAGNOSIS — I10 HYPERTENSION GOAL BP (BLOOD PRESSURE) < 140/90: ICD-10-CM

## 2019-06-05 RX ORDER — CARVEDILOL 12.5 MG/1
TABLET ORAL
Qty: 60 TABLET | Refills: 0 | OUTPATIENT
Start: 2019-06-05

## 2019-06-05 NOTE — TELEPHONE ENCOUNTER
"Requested Prescriptions   Pending Prescriptions Disp Refills     carvedilol (COREG) 12.5 MG tablet [Pharmacy Med Name: CARVEDILOL 12.5MG TABLETS] 60 tablet 0     Sig: TAKE 1 TABLET(12.5 MG) BY MOUTH TWICE DAILY WITH MEALS   Last Written Prescription Date:  5-20-19  Last Fill Quantity: 60,  # refills: 1   Last office visit: No previous visit found with prescribing provider:  5-20-19   Future Office Visit:   Next 5 appointments (look out 90 days)    Saud 10, 2019 11:20 AM CDT  SHORT with Frankie Kimbrough MD  [facility] ([facility]) [address] [facility] [address]  [phone]             Beta-Blockers Protocol Failed - 6/5/2019  3:46 AM        Failed - Blood pressure under 140/90 in past 12 months     BP Readings from Last 3 Encounters:   05/20/19 (!) 141/92   11/19/18 124/82   10/29/18 (!) 146/92                 Passed - Patient is age 6 or older        Passed - Recent (12 mo) or future (30 days) visit within the authorizing provider's specialty     Patient had office visit in the last 12 months or has a visit in the next 30 days with authorizing provider or within the authorizing provider's specialty.  See \"Patient Info\" tab in inbasket, or \"Choose Columns\" in Meds & Orders section of the refill encounter.              Passed - Medication is active on med list          "

## 2019-06-07 ENCOUNTER — TRANSFERRED RECORDS (OUTPATIENT)
Dept: HEALTH INFORMATION MANAGEMENT | Facility: CLINIC | Age: 59
End: 2019-06-07

## 2019-06-10 ENCOUNTER — OFFICE VISIT (OUTPATIENT)
Dept: FAMILY MEDICINE | Facility: CLINIC | Age: 59
End: 2019-06-10
Payer: COMMERCIAL

## 2019-06-10 VITALS
DIASTOLIC BLOOD PRESSURE: 79 MMHG | HEART RATE: 59 BPM | SYSTOLIC BLOOD PRESSURE: 119 MMHG | OXYGEN SATURATION: 97 % | BODY MASS INDEX: 34.43 KG/M2 | WEIGHT: 261 LBS | TEMPERATURE: 97.3 F

## 2019-06-10 DIAGNOSIS — I10 ESSENTIAL HYPERTENSION WITH GOAL BLOOD PRESSURE LESS THAN 140/90: ICD-10-CM

## 2019-06-10 DIAGNOSIS — Z12.11 SCREENING FOR COLON CANCER: Primary | ICD-10-CM

## 2019-06-10 DIAGNOSIS — I10 HYPERTENSION GOAL BP (BLOOD PRESSURE) < 140/90: ICD-10-CM

## 2019-06-10 PROCEDURE — 99213 OFFICE O/P EST LOW 20 MIN: CPT | Performed by: FAMILY MEDICINE

## 2019-06-10 RX ORDER — LISINOPRIL AND HYDROCHLOROTHIAZIDE 12.5; 2 MG/1; MG/1
2 TABLET ORAL DAILY
Qty: 60 TABLET | Refills: 5 | Status: SHIPPED | OUTPATIENT
Start: 2019-06-10 | End: 2019-12-27

## 2019-06-10 RX ORDER — AMLODIPINE BESYLATE 10 MG/1
10 TABLET ORAL DAILY
Qty: 30 TABLET | Refills: 5 | Status: SHIPPED | OUTPATIENT
Start: 2019-06-10 | End: 2019-12-26

## 2019-06-10 NOTE — PROGRESS NOTES
Subjective     Thien Zarate is a 58 year old male who presents to clinic today for the following health issues:    HPI   Hypertension Follow-up      Do you check your blood pressure regularly outside of the clinic? Yes but has an old BP cuff    Are you following a low salt diet? Yes    Are your blood pressures ever more than 140 on the top number (systolic) OR more   than 90 on the bottom number (diastolic), for example 140/90? Yes    Amount of exercise or physical activity: walking    Problems taking medications regularly: No    Medication side effects: none    Diet: no salt added    Pt would like refills on amlodipine.    Past Medical History:   Diagnosis Date     Arthritis      Gout      Hypertension        Past Surgical History:   Procedure Laterality Date     DISCECTOMY LUMBAR POSTERIOR MICROSCOPIC ONE LEVEL Left 3/24/2015    Procedure: DISCECTOMY LUMBAR POSTERIOR MICROSCOPIC ONE LEVEL;  Surgeon: Cricket Stone MD;  Location: [facility] OR      SACROPLASTY      and also laminectomy, unsure what level     Left MMT arthroscopy       ORTHOPEDIC SURGERY  2011    Rt knee     ORTHOPEDIC SURGERY  3/9/2012    Lt knee     Rt MMT arthroscopy         Family History   Problem Relation Age of Onset     Cardiovascular Father      Heart Disease Sister        Social History     Tobacco Use     Smoking status: Former Smoker     Years: 30.00     Types: Cigars     Last attempt to quit: 6/3/1979     Years since quittin.0     Smokeless tobacco: Never Used   Substance Use Topics     Alcohol use: Yes       Reviewed and updated as needed this visit by Provider         Review of Systems   Ros: no chest pain, chest tightness   No sob   No leg edema   No abdominal pain     Denies fever or chills   Weight is good     Patient has bilateral knee pain.    He is going to have both at once           Objective    /79 (BP Location: Left arm, Patient Position: Chair, Cuff Size: Adult Large)   Pulse 59   Temp 97.3  F (36.3   C) (Oral)   Wt 118.4 kg (261 lb)   SpO2 97%   BMI 34.43 kg/m    Body mass index is 34.43 kg/m .     Wt Readings from Last 4 Encounters:   06/10/19 118.4 kg (261 lb)   05/20/19 121.6 kg (268 lb)   11/19/18 124.3 kg (274 lb)   10/29/18 126.1 kg (278 lb)       Physical Exam     Chest wall normal to inspection and palpation. Good excursion bilaterally. Lungs clear to auscultation. Good air movement bilaterally without rales, wheezes, or rhonchi.   Regular rate and  rhythm. S1 and S2 normal, no murmurs, clicks, gallops or rubs. No edema or JVD.        ICD-10-CM    1. Screening for colon cancer Z12.11    2. Hypertension goal BP (blood pressure) < 140/90 I10    3. Essential hypertension with goal blood pressure less than 140/90 I10 lisinopril-hydrochlorothiazide (PRINZIDE/ZESTORETIC) 20-12.5 MG tablet     amLODIPine (NORVASC) 10 MG tablet     Currently bp med is working well   Follow up in 6 months   No change in dose

## 2019-07-20 DIAGNOSIS — I10 HYPERTENSION GOAL BP (BLOOD PRESSURE) < 140/90: ICD-10-CM

## 2019-07-22 RX ORDER — CARVEDILOL 25 MG/1
TABLET ORAL
Qty: 60 TABLET | Refills: 0 | Status: SHIPPED | OUTPATIENT
Start: 2019-07-22 | End: 2019-08-18

## 2019-07-22 NOTE — TELEPHONE ENCOUNTER
"Requested Prescriptions   Pending Prescriptions Disp Refills     carvedilol (COREG) 25 MG tablet [Pharmacy Med Name: CARVEDILOL 25MG TABLETS] 60 tablet 0     Sig: TAKE 1 TABLET(25 MG) BY MOUTH TWICE DAILY WITH MEALS   Last Written Prescription Date:  5/20/19  Last Fill Quantity: 60,  # refills: 1   Last office visit: 6/10/2019 with prescribing provider:     Future Office Visit:   Next 5 appointments (look out 90 days)    Jul 25, 2019 11:40 AM CDT  Pre-Op physical with Frankie Kimbrough MD  [facility] ([facility]) [address] [place] [id number]  [phone]             Beta-Blockers Protocol Passed - 7/20/2019  1:43 PM        Passed - Blood pressure under 140/90 in past 12 months     BP Readings from Last 3 Encounters:   06/10/19 119/79   05/20/19 (!) 141/92   11/19/18 124/82                 Passed - Patient is age 6 or older        Passed - Recent (12 mo) or future (30 days) visit within the authorizing provider's specialty     Patient had office visit in the last 12 months or has a visit in the next 30 days with authorizing provider or within the authorizing provider's specialty.  See \"Patient Info\" tab in inbasket, or \"Choose Columns\" in Meds & Orders section of the refill encounter.              Passed - Medication is active on med list          "

## 2019-07-25 ENCOUNTER — OFFICE VISIT (OUTPATIENT)
Dept: FAMILY MEDICINE | Facility: CLINIC | Age: 59
End: 2019-07-25
Payer: COMMERCIAL

## 2019-07-25 VITALS
WEIGHT: 264 LBS | HEART RATE: 66 BPM | OXYGEN SATURATION: 96 % | BODY MASS INDEX: 34.99 KG/M2 | DIASTOLIC BLOOD PRESSURE: 79 MMHG | TEMPERATURE: 97.2 F | HEIGHT: 73 IN | SYSTOLIC BLOOD PRESSURE: 118 MMHG

## 2019-07-25 DIAGNOSIS — I10 HYPERTENSION GOAL BP (BLOOD PRESSURE) < 140/90: ICD-10-CM

## 2019-07-25 DIAGNOSIS — Z01.818 PREOP GENERAL PHYSICAL EXAM: Primary | ICD-10-CM

## 2019-07-25 DIAGNOSIS — E78.5 HYPERLIPIDEMIA LDL GOAL <130: ICD-10-CM

## 2019-07-25 LAB
ALBUMIN UR-MCNC: NEGATIVE MG/DL
ANION GAP SERPL CALCULATED.3IONS-SCNC: 7 MMOL/L (ref 3–14)
APPEARANCE UR: CLEAR
BASOPHILS # BLD AUTO: 0 10E9/L (ref 0–0.2)
BASOPHILS NFR BLD AUTO: 0.7 %
BILIRUB UR QL STRIP: NEGATIVE
BUN SERPL-MCNC: 14 MG/DL (ref 7–30)
CALCIUM SERPL-MCNC: 8.9 MG/DL (ref 8.5–10.1)
CHLORIDE SERPL-SCNC: 106 MMOL/L (ref 94–109)
CO2 SERPL-SCNC: 28 MMOL/L (ref 20–32)
COLOR UR AUTO: YELLOW
CREAT SERPL-MCNC: 0.71 MG/DL (ref 0.66–1.25)
DIFFERENTIAL METHOD BLD: NORMAL
EOSINOPHIL # BLD AUTO: 0.1 10E9/L (ref 0–0.7)
EOSINOPHIL NFR BLD AUTO: 1.8 %
ERYTHROCYTE [DISTWIDTH] IN BLOOD BY AUTOMATED COUNT: 14.6 % (ref 10–15)
GFR SERPL CREATININE-BSD FRML MDRD: >90 ML/MIN/{1.73_M2}
GLUCOSE SERPL-MCNC: 120 MG/DL (ref 70–99)
GLUCOSE UR STRIP-MCNC: NEGATIVE MG/DL
HCT VFR BLD AUTO: 42.1 % (ref 40–53)
HGB BLD-MCNC: 14.4 G/DL (ref 13.3–17.7)
HGB UR QL STRIP: NEGATIVE
KETONES UR STRIP-MCNC: NEGATIVE MG/DL
LEUKOCYTE ESTERASE UR QL STRIP: NEGATIVE
LYMPHOCYTES # BLD AUTO: 1.5 10E9/L (ref 0.8–5.3)
LYMPHOCYTES NFR BLD AUTO: 26.9 %
MCH RBC QN AUTO: 30.8 PG (ref 26.5–33)
MCHC RBC AUTO-ENTMCNC: 34.2 G/DL (ref 31.5–36.5)
MCV RBC AUTO: 90 FL (ref 78–100)
MONOCYTES # BLD AUTO: 0.7 10E9/L (ref 0–1.3)
MONOCYTES NFR BLD AUTO: 12.8 %
NEUTROPHILS # BLD AUTO: 3.2 10E9/L (ref 1.6–8.3)
NEUTROPHILS NFR BLD AUTO: 57.8 %
NITRATE UR QL: NEGATIVE
PH UR STRIP: 6 PH (ref 5–7)
PLATELET # BLD AUTO: 205 10E9/L (ref 150–450)
POTASSIUM SERPL-SCNC: 3.4 MMOL/L (ref 3.4–5.3)
RBC # BLD AUTO: 4.68 10E12/L (ref 4.4–5.9)
SODIUM SERPL-SCNC: 141 MMOL/L (ref 133–144)
SOURCE: NORMAL
SP GR UR STRIP: 1.02 (ref 1–1.03)
UROBILINOGEN UR STRIP-ACNC: 0.2 EU/DL (ref 0.2–1)
WBC # BLD AUTO: 5.5 10E9/L (ref 4–11)

## 2019-07-25 PROCEDURE — 81003 URINALYSIS AUTO W/O SCOPE: CPT | Performed by: FAMILY MEDICINE

## 2019-07-25 PROCEDURE — 99214 OFFICE O/P EST MOD 30 MIN: CPT | Performed by: FAMILY MEDICINE

## 2019-07-25 PROCEDURE — 85025 COMPLETE CBC W/AUTO DIFF WBC: CPT | Performed by: FAMILY MEDICINE

## 2019-07-25 PROCEDURE — 80048 BASIC METABOLIC PNL TOTAL CA: CPT | Performed by: FAMILY MEDICINE

## 2019-07-25 PROCEDURE — 36415 COLL VENOUS BLD VENIPUNCTURE: CPT | Performed by: FAMILY MEDICINE

## 2019-07-25 ASSESSMENT — MIFFLIN-ST. JEOR: SCORE: 2071.38

## 2019-07-25 NOTE — PROGRESS NOTES
[address] [address] [phone]  Dept: [phone]    PRE-OP EVALUATION:  Today's date: 2019    Thien Zarate (: 1960) presents for pre-operative evaluation assessment as requested by Dr. Morrison.  He requires evaluation and anesthesia risk assessment prior to undergoing surgery/procedure for treatment of Bilat knee Replacement .    Fax number for surgical facility: [phone]  Primary Physician: Frankie Kimbrough  Type of Anesthesia Anticipated: to be determined    Patient has a Health Care Directive or Living Will:  NO    Preop Questions 2019   Who is doing your surgery? dr edis morrison   What are you having done? [facility]   Date of Surgery/Procedure:    Facility or Hospital where procedure/surgery will be performed[facility]   1.  Do you have a history of Heart attack, stroke, stent, coronary bypass surgery, or other heart surgery? No   2.  Do you ever have any pain or discomfort in your chest? No   3.  Do you have a history of  Heart Failure? No   4.   Are you troubled by shortness of breath when:  walking on a level surface, or up a slight hill, or at night? No   5.  Do you currently have a cold, bronchitis or other respiratory infection? No   6.  Do you have a cough, shortness of breath, or wheezing? No   7.  Do you sometimes get pains in the calves of your legs when you walk? No   8. Do you or anyone in your family have previous history of blood clots? No   9.  Do you or does anyone in your family have a serious bleeding problem such as prolonged bleeding following surgeries or cuts? No   10. Have you ever had problems with anemia or been told to take iron pills? No   11. Have you had any abnormal blood loss such as black, tarry or bloody stools? No   12. Have you ever had a blood transfusion? UNKNOWN    13. Have you or any of your relatives ever had problems with  anesthesia? No   14. Do you have sleep apnea, excessive snoring or daytime drowsiness? No   15. Do you have any prosthetic heart valves? No   16. Do you have prosthetic joints? No         HPI:     HPI related to upcoming procedure: both knees are having pain   He is having bilateral knees         HYPERLIPIDEMIA - Patient has a long history of significant Hyperlipidemia requiring medication for treatment with recent good control. Patient reports no problems or side effects with the medication.     HYPERTENSION - Patient has longstanding history of HTN , currently denies any symptoms referable to elevated blood pressure. Specifically denies chest pain, palpitations, dyspnea, orthopnea, PND or peripheral edema. Blood pressure readings have been in normal range. Current medication regimen is as listed below. Patient denies any side effects of medication.       MEDICAL HISTORY:     Patient Active Problem List    Diagnosis Date Noted     Morbid obesity (H) 11/02/2017     Priority: Medium     Non morbid obesity due to excess calories 09/14/2017     Priority: Medium     Advanced directives, counseling/discussion 09/02/2016     Priority: Medium     Advance Care Planning 9/2/2016: ACP Review of Chart / Resources Provided:  Reviewed chart for advance care plan.  Thien Zarate has no plan or code status on file. Discussed available resources and provided with information. Confirmed code status reflects current choices pending further ACP discussions.  Confirmed/documented legally designated decision makers.  Added by Yessenia Diez             Hyperlipidemia LDL goal <130 06/04/2013     Priority: Medium     Hypertension goal BP (blood pressure) < 140/90 08/25/2011     Priority: Medium      Past Medical History:   Diagnosis Date     Arthritis      Gout      Hypertension      Past Surgical History:   Procedure Laterality Date     DISCECTOMY LUMBAR POSTERIOR MICROSCOPIC ONE LEVEL Left 3/24/2015    Procedure: DISCECTOMY LUMBAR  POSTERIOR MICROSCOPIC ONE LEVEL;  Surgeon: Cricket Stone MD;  Location: [facility] OR     HC SACROPLASTY      and also laminectomy, unsure what level     Left MMT arthroscopy  2012     ORTHOPEDIC SURGERY  2011    Rt knee     ORTHOPEDIC SURGERY  3/9/2012    Lt knee     Rt MMT arthroscopy       Current Outpatient Medications   Medication Sig Dispense Refill     Acetaminophen (TYLENOL 8 HOUR PO) Take  by mouth. As needed       amLODIPine (NORVASC) 10 MG tablet Take 1 tablet (10 mg) by mouth daily 30 tablet 5     carvedilol (COREG) 25 MG tablet TAKE 1 TABLET(25 MG) BY MOUTH TWICE DAILY WITH MEALS 60 tablet 0     indomethacin (INDOCIN) 25 MG capsule TAKE 2 CAPSULE BY MOUTH TWICE DAILY AS NEEDED WITH MEALS 30 capsule 0     lisinopril-hydrochlorothiazide (PRINZIDE/ZESTORETIC) 20-12.5 MG tablet Take 2 tablets by mouth daily 60 tablet 5     Omega-3 Fatty Acids (FISH OIL PO) Take 1,000 mg by mouth 3 TABLETS DAILY     Patient has Tramadol at home, but is not using them       OTC products: None, except as noted above    Allergies   Allergen Reactions     Penicillins Unknown      Latex Allergy: NO    Social History     Tobacco Use     Smoking status: Former Smoker     Years: 30.00     Types: Cigars     Last attempt to quit: 6/3/1979     Years since quittin.1     Smokeless tobacco: Never Used   Substance Use Topics     Alcohol use: Yes     History   Drug Use No       REVIEW OF SYSTEMS:   CONSTITUTIONAL: NEGATIVE for fever, chills, change in weight  INTEGUMENTARY/SKIN: NEGATIVE for worrisome rashes, moles or lesions  EYES: NEGATIVE for vision changes or irritation  ENT/MOUTH: NEGATIVE for ear, mouth and throat problems  RESP: NEGATIVE for significant cough or SOB  BREAST: NEGATIVE for masses, tenderness or discharge  CV: NEGATIVE for chest pain, palpitations or peripheral edema  GI: NEGATIVE for nausea, abdominal pain, heartburn, or change in bowel habits  : NEGATIVE for frequency, dysuria, or hematuria  MUSCULOSKELETAL:  "NEGATIVE for significant arthralgias or myalgia  NEURO: NEGATIVE for weakness, dizziness or paresthesias  ENDOCRINE: NEGATIVE for temperature intolerance, skin/hair changes  HEME: NEGATIVE for bleeding problems  PSYCHIATRIC: NEGATIVE for changes in mood or affect    EXAM:   /79 (BP Location: Right arm, Patient Position: Sitting, Cuff Size: Adult Large)   Pulse 66   Temp 97.2  F (36.2  C) (Oral)   Ht 1.854 m (6' 1\")   Wt 119.7 kg (264 lb)   SpO2 96%   BMI 34.83 kg/m       Wt Readings from Last 4 Encounters:   07/25/19 119.7 kg (264 lb)   06/10/19 118.4 kg (261 lb)   05/20/19 121.6 kg (268 lb)   11/19/18 124.3 kg (274 lb)       GENERAL APPEARANCE: healthy, alert and no distress     EYES: EOMI,  PERRL     HENT: ear canals and TM's normal and nose and mouth without ulcers or lesions     NECK: no adenopathy, no asymmetry, masses, or scars and thyroid normal to palpation     RESP: lungs clear to auscultation - no rales, rhonchi or wheezes     CV: regular rates and rhythm, normal S1 S2, no S3 or S4 and no murmur, click or rub     ABDOMEN:  soft, nontender, no HSM or masses and bowel sounds normal     MS: extremities normal- no gross deformities noted, no evidence of inflammation in joints, FROM in all extremities.     SKIN: no suspicious lesions or rashes     NEURO: Normal strength and tone, sensory exam grossly normal, mentation intact and speech normal     PSYCH: mentation appears normal. and affect normal/bright     LYMPHATICS: No cervical adenopathy    DIAGNOSTICS:   EKG: appears normal, NSR, normal axis, normal intervals, no acute ST/T changes c/w ischemia, no LVH by voltage criteria, unchanged from previous tracings    Recent Labs   Lab Test 10/29/18  1731 09/14/17  1756  06/16/15  1530  03/06/12  1825   HGB  --  14.7  --  14.1   < > 14.1   PLT  --  222  --  229   < > 186    142   < >  --    < > 142   POTASSIUM 3.6 3.3*   < >  --    < > 3.8   CR 0.65* 0.66   < >  --    < > 0.79   A1C  --   --   --   " --   --  5.3    < > = values in this interval not displayed.        IMPRESSION:   Reason for surgery/procedure: bilaeral knee pain   Diagnosis/reason for consult: pre op authorization     The proposed surgical procedure is considered INTERMEDIATE risk.    REVISED CARDIAC RISK INDEX  The patient has the following serious cardiovascular risks for perioperative complications such as (MI, PE, VFib and 3  AV Block):  No serious cardiac risks  INTERPRETATION: 0 risks: Class I (very low risk - 0.4% complication rate)    The patient has the following additional risks for perioperative complications:  No identified additional risks      ICD-10-CM    1. Preop general physical exam Z01.818        RECOMMENDATIONS:         --Patient is to take all scheduled medications on the day of surgery EXCEPT for modifications listed below.    APPROVAL GIVEN to proceed with proposed procedure, without further diagnostic evaluation       Signed Electronically by: Frankie Kimbrough MD    Copy of this evaluation report is provided to requesting physician.    [facility] Preop Guidelines    Revised Cardiac Risk Index

## 2019-07-25 NOTE — Clinical Note
Dr. Morrison.  He requires evaluation and anesthesia risk assessment prior to undergoing surgery/procedure for treatment of Bilat knee Replacement .Fax number for surgical facility: [phone] Physician: Frankie Kimbrough

## 2019-07-26 NOTE — RESULT ENCOUNTER NOTE
Your complete blood count is normal.  Urinalysis is normal  Electrolytes are normal and kidney function tests are normal, however your blood sugar is 120.  This is clearly in the prediabetic level if this is a fasting specimen.  He should have this test repeated in approximately 6 months to make sure that everything stays normal.  He could certainly have it done sooner and on a fasting level.  If it stays elevated some people start you on a medication to prevent you from going into diabetes.  This would be called metformin.

## 2019-08-01 ENCOUNTER — TRANSFERRED RECORDS (OUTPATIENT)
Dept: HEALTH INFORMATION MANAGEMENT | Facility: CLINIC | Age: 59
End: 2019-08-01

## 2019-08-14 ENCOUNTER — TELEPHONE (OUTPATIENT)
Dept: FAMILY MEDICINE | Facility: CLINIC | Age: 59
End: 2019-08-14

## 2019-08-14 ENCOUNTER — TRANSFERRED RECORDS (OUTPATIENT)
Dept: HEALTH INFORMATION MANAGEMENT | Facility: CLINIC | Age: 59
End: 2019-08-14

## 2019-08-14 NOTE — TELEPHONE ENCOUNTER
Reason for Call:  Other/ Home care follow up    Detailed comments: Dhara with [facility] called and stated they would like to confirm if PCP would be okay to follow up on home care orders.    Phone Number Patient can be reached at: [phone] (Dhara kimble/[employer])    Best Time: ASAP    Can we leave a detailed message on this number? YES    Call taken on 8/14/2019 at 4:14 PM by Lisa Reynolds

## 2019-08-18 DIAGNOSIS — I10 HYPERTENSION GOAL BP (BLOOD PRESSURE) < 140/90: ICD-10-CM

## 2019-08-19 NOTE — TELEPHONE ENCOUNTER
"Requested Prescriptions   Pending Prescriptions Disp Refills     carvedilol (COREG) 25 MG tablet [Pharmacy Med Name: CARVEDILOL 25MG TABLETS] 60 tablet 0     Sig: TAKE 1 TABLET(25 MG) BY MOUTH TWICE DAILY WITH MEALS   Last Written Prescription Date:  7/22/19  Last Fill Quantity: 60,  # refills: 0   Last office visit: 7/25/2019 with prescribing provider:     Future Office Visit:   Next 5 appointments (look out 90 days)    Aug 22, 2019 10:40 AM CDT  SHORT with Frankie Kimbrough MD  [facility] ([facility]) [address] [place] [address]  [phone]             Beta-Blockers Protocol Passed - 8/18/2019 12:45 PM        Passed - Blood pressure under 140/90 in past 12 months     BP Readings from Last 3 Encounters:   07/25/19 118/79   06/10/19 119/79   05/20/19 (!) 141/92                 Passed - Patient is age 6 or older        Passed - Recent (12 mo) or future (30 days) visit within the authorizing provider's specialty     Patient had office visit in the last 12 months or has a visit in the next 30 days with authorizing provider or within the authorizing provider's specialty.  See \"Patient Info\" tab in inbasket, or \"Choose Columns\" in Meds & Orders section of the refill encounter.              Passed - Medication is active on med list          "

## 2019-08-20 ENCOUNTER — TELEPHONE (OUTPATIENT)
Dept: FAMILY MEDICINE | Facility: CLINIC | Age: 59
End: 2019-08-20

## 2019-08-20 RX ORDER — CARVEDILOL 25 MG/1
TABLET ORAL
Qty: 60 TABLET | Refills: 0 | Status: SHIPPED | OUTPATIENT
Start: 2019-08-20 | End: 2020-01-30

## 2019-08-20 NOTE — TELEPHONE ENCOUNTER
Forms received from: [facility]   Phone number listed: [phone]   Fax listed: [phone]  Date received: 08/20/2019  Form description: admission orders   Once forms are completed, please return to [facility]  via fax.  Form placed: in providers folder  Margarita Ivey

## 2019-08-21 DIAGNOSIS — M1A.0710 IDIOPATHIC CHRONIC GOUT OF RIGHT FOOT WITHOUT TOPHUS: ICD-10-CM

## 2019-08-22 ENCOUNTER — OFFICE VISIT (OUTPATIENT)
Dept: FAMILY MEDICINE | Facility: CLINIC | Age: 59
End: 2019-08-22
Payer: COMMERCIAL

## 2019-08-22 VITALS
DIASTOLIC BLOOD PRESSURE: 64 MMHG | BODY MASS INDEX: 34.1 KG/M2 | OXYGEN SATURATION: 95 % | TEMPERATURE: 97.9 F | WEIGHT: 258.5 LBS | SYSTOLIC BLOOD PRESSURE: 122 MMHG | HEART RATE: 73 BPM

## 2019-08-22 DIAGNOSIS — Z96.653 AFTERCARE FOLLOWING BILATERAL KNEE JOINT REPLACEMENT SURGERY: ICD-10-CM

## 2019-08-22 DIAGNOSIS — E66.01 MORBID OBESITY (H): ICD-10-CM

## 2019-08-22 DIAGNOSIS — I10 HYPERTENSION GOAL BP (BLOOD PRESSURE) < 140/90: Primary | ICD-10-CM

## 2019-08-22 DIAGNOSIS — Z47.1 AFTERCARE FOLLOWING BILATERAL KNEE JOINT REPLACEMENT SURGERY: ICD-10-CM

## 2019-08-22 PROCEDURE — 99214 OFFICE O/P EST MOD 30 MIN: CPT | Performed by: FAMILY MEDICINE

## 2019-08-22 RX ORDER — ASPIRIN 325 MG
325 TABLET ORAL DAILY
COMMUNITY
End: 2023-08-08

## 2019-08-22 NOTE — PROGRESS NOTES
Subjective     Thien Zarate is a 59 year old male who presents to clinic today for the following health issues:    HPI       Hospital Follow-up Visit:    Hospital/Nursing Home/IP Rehab Facility: [facility]  Date of Admission: 8/1/19  Date of Discharge: 8/5/19  Reason(s) for Admission: totally knee arthroplasty, bilateral            Problems taking medications regularly:  None       Medication changes since discharge: pt was on Xarelto but was told he does not need to take it anymore and can take Aspirin 325 mg       Problems adhering to non-medication therapy:  None    Summary of hospitalization:  CareEverywhere information obtained and reviewed  Diagnostic Tests/Treatments reviewed.  Follow up needed: rehab (physical therapy )   Other Healthcare Providers Involved in Patient s Care:         fu with surgeon  1 month   Update since discharge: improved.     Post Discharge Medication Reconciliation: discharge medications reconciled, continue medications without change.  Plan of care communicated with patient     Coding guidelines for this visit:  Type of Medical   Decision Making Face-to-Face Visit       within 7 Days of discharge Face-to-Face Visit        within 14 days of discharge   Moderate Complexity [id number]   High Complexity [id number]                Current Outpatient Medications   Medication Sig Dispense Refill     Acetaminophen (TYLENOL 8 HOUR PO) Take  by mouth. As needed       amLODIPine (NORVASC) 10 MG tablet Take 1 tablet (10 mg) by mouth daily 30 tablet 5     aspirin (ASA) 325 MG tablet Take 325 mg by mouth daily       carvedilol (COREG) 25 MG tablet TAKE 1 TABLET(25 MG) BY MOUTH TWICE DAILY WITH MEALS 60 tablet 0     indomethacin (INDOCIN) 25 MG capsule TAKE 2 CAPSULE BY MOUTH TWICE DAILY AS NEEDED WITH MEALS 30 capsule 0     lisinopril-hydrochlorothiazide (PRINZIDE/ZESTORETIC) 20-12.5 MG tablet Take 2 tablets by mouth daily 60 tablet 5     Omega-3 Fatty Acids (FISH OIL PO) Take 1,000 mg by  mouth 3 TABLETS DAILY       Allergies   Allergen Reactions     Penicillins Unknown     Recent Labs   Lab Test 07/25/19  1247 10/29/18  1731 09/14/17  1756  08/14/14  1449  02/15/13  0820 03/06/12  1825   A1C  --   --   --   --   --   --   --  5.3   LDL  --   --   --   --  130*  --  125 140*   HDL  --   --   --   --  61  --  55 50   TRIG  --   --   --   --  157*  --  199* 281*   ALT  --   --   --   --   --   --  47 33   CR 0.71 0.65* 0.66   < >  --    < > 0.79 0.79   GFRESTIMATED >90 >90 >90   < >  --    < > >90 >90   GFRESTBLACK >90 >90 >90   < >  --    < > >90 >90   POTASSIUM 3.4 3.6 3.3*   < >  --    < > 3.6 3.8   TSH  --   --  2.22  --   --   --   --   --     < > = values in this interval not displayed.      In reviewing the chart patient did have an ultrasound done of his right leg because he suddenly developed swelling in the right foot his ultrasound was negative and the foot swelling resolved after ice pack and elevation for 1 day.    Reviewed and updated as needed this visit by Provider         Review of Systems   Patient gained 20 lbs when in hospital due to fluids   This has come off now.        Objective    /64 (BP Location: Right arm, Patient Position: Chair, Cuff Size: Adult Large)   Pulse 73   Temp 97.9  F (36.6  C) (Oral)   Wt 117.3 kg (258 lb 8 oz)   SpO2 95%   BMI 34.10 kg/m    Body mass index is 34.1 kg/m .  Physical Exam   Head: Normocephalic, atraumatic.  Eyes: Conjunctiva clear, non icteric. PERRLA.  Ears: External ears and TMs normal BL.  Nose: Septum midline, nasal mucosa pink and moist. No discharge.  Mouth / Throat: Normal dentition.  No oral lesions. Pharynx non erythematous, tonsils without hypertrophy.  Neck: Supple, no enlarged LN, trachea midline.    Chest wall normal to inspection and palpation. Good excursion bilaterally. Lungs clear to auscultation. Good air movement bilaterally without rales, wheezes, or rhonchi.   Regular rate and  rhythm. S1 and S2 normal, no murmurs,  clicks, gallops or rubs. 1+ edema, redness  or JVD.        ICD-10-CM    1. Hypertension goal BP (blood pressure) < 140/90 I10    2. Morbid obesity (H) E66.01    3. Aftercare following bilateral knee joint replacement surgery Z47.1     Z96.653      Follow-up in 6 months for blood pressure.  He has follow-up scheduled with orthopedics in 1 month.  Is been getting and continues to get rehab he is starting outpatient rehab now.    Patient is recovering well from his bilateral knee replacements.  He is able to bend the knee to 110 degrees on the left and 90 degrees on the right he is only taking 1 pill pain pill per day as a rule

## 2019-08-23 ENCOUNTER — TELEPHONE (OUTPATIENT)
Dept: FAMILY MEDICINE | Facility: CLINIC | Age: 59
End: 2019-08-23

## 2019-08-23 NOTE — TELEPHONE ENCOUNTER
Forms received from: [facility]   Phone number listed: [phone]   Fax listed: [phone]  Date received: 08/23/2019  Form description: physical therapy discharge summary  Once forms are completed, please return to [facility] via fax.  Form placed: in providers folder  Margarita Ivey

## 2019-08-26 ENCOUNTER — TELEPHONE (OUTPATIENT)
Dept: FAMILY MEDICINE | Facility: CLINIC | Age: 59
End: 2019-08-26

## 2019-08-26 NOTE — TELEPHONE ENCOUNTER
Forms received from: [facility]   Phone number listed: [phone]   Fax listed: [phone]  Date received: 08/26/2019  Form description: occupational therapy discharge summary  Once forms are completed, please return to [facility] via fax.  Form placed: in providers folder  Margarita Ivey

## 2019-08-30 ENCOUNTER — TELEPHONE (OUTPATIENT)
Dept: FAMILY MEDICINE | Facility: CLINIC | Age: 59
End: 2019-08-30

## 2019-08-30 NOTE — TELEPHONE ENCOUNTER
Forms received from: [facility] care   Phone number listed: [phone]   Fax listed: [phone]  Date received: 08/30/2019  Form description: occupational therapy discharge summary  Once forms are completed, please return to [facility] via fax.  Form placed: in providers folder  Margarita Ivey

## 2019-09-04 DIAGNOSIS — M1A.0710 IDIOPATHIC CHRONIC GOUT OF RIGHT FOOT WITHOUT TOPHUS: ICD-10-CM

## 2019-09-04 NOTE — TELEPHONE ENCOUNTER
Reason for call:  Patient reporting a symptom    Symptom or request: Gout symptoms    Duration (how long have symptoms been present): 2 days    Have you been treated for this before? Not currently- past history and PT states same symptoms    Additional comments: None    Phone Number patient can be reached at:  Home number on file [phone] (home)    Best Time:  anytime    Can we leave a detailed message on this number:  YES    Call taken on 9/4/2019 at 8:47 AM by Francy Viramontes

## 2019-09-04 NOTE — TELEPHONE ENCOUNTER
Routing refill request to provider for review/approval because:  Labs not current  Yamile Perez RN [facility] Triage.

## 2019-09-04 NOTE — TELEPHONE ENCOUNTER
"Requested Prescriptions   Pending Prescriptions Disp Refills     indomethacin (INDOCIN) 25 MG capsule 30 capsule 0     Sig: TAKE 2 CAPSULE BY MOUTH TWICE DAILY AS NEEDED WITH MEALS   Last Written Prescription Date:  7-10-17  Last Fill Quantity: 30,  # refills: 0   Last office visit: 8/22/2019 with prescribing provider:  8-22-19   Future Office Visit:        Gout Agents Protocol Failed - 9/4/2019  8:59 AM        Failed - ALT on file in past 12 months     Recent Labs   Lab Test 02/15/13  0820   ALT 47             Failed - Has Uric Acid on file in past 12 months and value is less than 6     Recent Labs   Lab Test 06/16/15  1530   URIC 9.6*     If level is 6mg/dL or greater, ok to refill one time and refer to provider.           Passed - CBC on file in past 12 months     Recent Labs   Lab Test 07/25/19  1247   WBC 5.5   RBC 4.68   HGB 14.4   HCT 42.1                    Passed - Recent (12 mo) or future (30 days) visit within the authorizing provider's specialty     Patient had office visit in the last 12 months or has a visit in the next 30 days with authorizing provider or within the authorizing provider's specialty.  See \"Patient Info\" tab in inbasket, or \"Choose Columns\" in Meds & Orders section of the refill encounter.              Passed - Medication is active on med list        Passed - Patient is age 18 or older        Passed - Normal serum creatinine on file in the past 12 months     Recent Labs   Lab Test 07/25/19  1247   CR 0.71               "

## 2019-09-04 NOTE — TELEPHONE ENCOUNTER
"Called patient and he is looking for a refill of his indomethacin. He stated his gout in his right wrist flares about twice a year and he is unable to refill this medication through the pharmacy.     Routing refill request to provider for review/approval because:  Labs not current:  See below.     Tiffany Rowland, RN      Requested Prescriptions   Pending Prescriptions Disp Refills     indomethacin (INDOCIN) 25 MG capsule 30 capsule 0     Sig: TAKE 2 CAPSULE BY MOUTH TWICE DAILY AS NEEDED WITH MEALS       Gout Agents Protocol Failed - 9/4/2019  8:59 AM        Failed - ALT on file in past 12 months     Recent Labs   Lab Test 02/15/13  0820   ALT 47             Failed - Has Uric Acid on file in past 12 months and value is less than 6     Recent Labs   Lab Test 06/16/15  1530   URIC 9.6*     If level is 6mg/dL or greater, ok to refill one time and refer to provider.           Passed - CBC on file in past 12 months     Recent Labs   Lab Test 07/25/19  1247   WBC 5.5   RBC 4.68   HGB 14.4   HCT 42.1                    Passed - Recent (12 mo) or future (30 days) visit within the authorizing provider's specialty     Patient had office visit in the last 12 months or has a visit in the next 30 days with authorizing provider or within the authorizing provider's specialty.  See \"Patient Info\" tab in inbasket, or \"Choose Columns\" in Meds & Orders section of the refill encounter.              Passed - Medication is active on med list        Passed - Patient is age 18 or older        Passed - Normal serum creatinine on file in the past 12 months     Recent Labs   Lab Test 07/25/19  1247   CR 0.71                 "

## 2019-09-05 ENCOUNTER — TELEPHONE (OUTPATIENT)
Dept: FAMILY MEDICINE | Facility: CLINIC | Age: 59
End: 2019-09-05

## 2019-09-05 RX ORDER — INDOMETHACIN 25 MG/1
CAPSULE ORAL
Qty: 30 CAPSULE | Refills: 0 | Status: SHIPPED | OUTPATIENT
Start: 2019-09-05 | End: 2020-07-03

## 2019-09-05 NOTE — TELEPHONE ENCOUNTER
Reason for Call:  Other / Requests call back    Detailed comments: Patient called and stated he requested this medication for his gout flair up a few days ago.  Patient also stated he cannot continue to take anymore ibuprofen because although he has been practically swallowing it, still it is not doing any good. Patient also stated he cannot take the pain and swelling anymore because he bumps into everything on his way and he is not even sleeping at nights.  Patient is requesting this refill as soon as possible or a call back to explain why it has not been approved.    Phone Number Patient can be reached at: Home number on file [phone] (home)    Best Time: ASAP    Can we leave a detailed message on this number? YES    Call taken on 9/5/2019 at 3:22 PM by Lisa Reynolds

## 2019-09-05 NOTE — TELEPHONE ENCOUNTER
Forms received from: [facility] care   Phone number listed: [phone]    Fax listed: [phone]  Date received: 09/05/2019  Form description: physician orders  Once forms are completed, please return to [facility] via fax.  Form placed: in providers folder  Margarita Ivey

## 2019-09-11 ENCOUNTER — TRANSFERRED RECORDS (OUTPATIENT)
Dept: HEALTH INFORMATION MANAGEMENT | Facility: CLINIC | Age: 59
End: 2019-09-11

## 2019-09-13 ENCOUNTER — TELEPHONE (OUTPATIENT)
Dept: FAMILY MEDICINE | Facility: CLINIC | Age: 59
End: 2019-09-13

## 2019-09-13 NOTE — TELEPHONE ENCOUNTER
Forms received from: [facility]   Phone number listed: [phone]   Fax listed: [phone]  Date received: 09/13/2019  Form description: skilled nurse patient missed visit  Once forms are completed, please return to [facility] via fax.  Form placed: in providers folder  Margarita Ivey

## 2019-09-23 DIAGNOSIS — I10 HYPERTENSION GOAL BP (BLOOD PRESSURE) < 140/90: ICD-10-CM

## 2019-09-23 NOTE — TELEPHONE ENCOUNTER
"Requested Prescriptions   Pending Prescriptions Disp Refills     carvedilol (COREG) 25 MG tablet [Pharmacy Med Name: CARVEDILOL 25MG TABLETS] 60 tablet 0     Sig: TAKE 1 TABLET(25 MG) BY MOUTH TWICE DAILY WITH MEALS   Last Written Prescription Date:  8/20/19  Last Fill Quantity: 60,  # refills: 0   Last office visit: 8/22/2019 with prescribing provider:     Future Office Visit:        Beta-Blockers Protocol Passed - 9/23/2019  9:18 AM        Passed - Blood pressure under 140/90 in past 12 months     BP Readings from Last 3 Encounters:   08/22/19 122/64   07/25/19 118/79   06/10/19 119/79                 Passed - Patient is age 6 or older        Passed - Recent (12 mo) or future (30 days) visit within the authorizing provider's specialty     Patient had office visit in the last 12 months or has a visit in the next 30 days with authorizing provider or within the authorizing provider's specialty.  See \"Patient Info\" tab in inbasket, or \"Choose Columns\" in Meds & Orders section of the refill encounter.              Passed - Medication is active on med list          "

## 2019-09-24 ENCOUNTER — TELEPHONE (OUTPATIENT)
Dept: FAMILY MEDICINE | Facility: CLINIC | Age: 59
End: 2019-09-24

## 2019-09-24 RX ORDER — CARVEDILOL 25 MG/1
TABLET ORAL
Qty: 180 TABLET | Refills: 1 | Status: SHIPPED | OUTPATIENT
Start: 2019-09-24 | End: 2020-05-20

## 2019-09-24 NOTE — TELEPHONE ENCOUNTER
Forms received from: [facility] care   Phone number listed: [phone]   Fax listed: [phone]  Date received: 09/24/2019  Form description: skilled nurse patient missed visit  Once forms are completed, please return to [facility]  via fax.  Form placed: in covering providers folder  Margarita Ivey

## 2019-09-24 NOTE — TELEPHONE ENCOUNTER
6 mos follow up advised at last visit 8/22/19.    Prescription approved per [facility] Refill Protocol.    Anastacia Ulrich RN  [facility]

## 2019-09-30 ENCOUNTER — TRANSFERRED RECORDS (OUTPATIENT)
Dept: HEALTH INFORMATION MANAGEMENT | Facility: CLINIC | Age: 59
End: 2019-09-30

## 2019-10-09 ENCOUNTER — TELEPHONE (OUTPATIENT)
Dept: FAMILY MEDICINE | Facility: CLINIC | Age: 59
End: 2019-10-09

## 2019-10-09 NOTE — TELEPHONE ENCOUNTER
Forms received from [facility]   Phone number listed: [phone]   Fax listed: [phone]  Date received: 10/09/2019  Form description: Skilled nurse patient missed visit  Once forms are completed, please return to [facility] via fax.  Form placed: in providers folder  Margarita Ivey

## 2019-10-10 ENCOUNTER — OFFICE VISIT (OUTPATIENT)
Dept: FAMILY MEDICINE | Facility: CLINIC | Age: 59
End: 2019-10-10
Payer: COMMERCIAL

## 2019-10-10 VITALS
WEIGHT: 259 LBS | HEART RATE: 65 BPM | SYSTOLIC BLOOD PRESSURE: 109 MMHG | HEIGHT: 73 IN | DIASTOLIC BLOOD PRESSURE: 74 MMHG | TEMPERATURE: 97.3 F | BODY MASS INDEX: 34.33 KG/M2 | OXYGEN SATURATION: 97 %

## 2019-10-10 DIAGNOSIS — Z87.39 HISTORY OF GOUT: ICD-10-CM

## 2019-10-10 DIAGNOSIS — E78.5 HYPERLIPIDEMIA LDL GOAL <130: ICD-10-CM

## 2019-10-10 DIAGNOSIS — Z01.818 PREOP GENERAL PHYSICAL EXAM: Primary | ICD-10-CM

## 2019-10-10 DIAGNOSIS — G56.01 RIGHT CARPAL TUNNEL SYNDROME: ICD-10-CM

## 2019-10-10 DIAGNOSIS — I10 HYPERTENSION GOAL BP (BLOOD PRESSURE) < 140/90: ICD-10-CM

## 2019-10-10 DIAGNOSIS — E66.01 MORBID OBESITY (H): ICD-10-CM

## 2019-10-10 PROCEDURE — 99214 OFFICE O/P EST MOD 30 MIN: CPT | Performed by: FAMILY MEDICINE

## 2019-10-10 ASSESSMENT — PAIN SCALES - GENERAL: PAINLEVEL: NO PAIN (0)

## 2019-10-10 ASSESSMENT — MIFFLIN-ST. JEOR: SCORE: 2052.31

## 2019-10-10 NOTE — PROGRESS NOTES
[address] [id number] [phone]  Dept: [phone]    PRE-OP EVALUATION:  Today's date: 10/10/2019    Thien Zarate (: 1960) presents for pre-operative evaluation assessment as requested by Dr. Bo.  He requires evaluation and anesthesia risk assessment prior to undergoing surgery/procedure for treatment of  Right dorsal carpal boss excision and right ECRB debridement .    Fax number for surgical facility: [phone]  Primary Physician: Frankie Kimbrough  Type of Anesthesia Anticipated: to be determined    Patient has a Health Care Directive or Living Will:  NO    Preop Questions 10/10/2019   Who is doing your surgery? dr dionicio bo   What are you having done? right dorsal carpal boss ecision rightECRB debriderment repaid   Date of Surgery/Procedure: 2019   Facility or Hospital where procedure/surgery will be performed: [facility]   1.  Do you have a history of Heart attack, stroke, stent, coronary bypass surgery, or other heart surgery? No   2.  Do you ever have any pain or discomfort in your chest? No   3.  Do you have a history of  Heart Failure? No   4.   Are you troubled by shortness of breath when:  walking on a level surface, or up a slight hill, or at night? No   5.  Do you currently have a cold, bronchitis or other respiratory infection? No   6.  Do you have a cough, shortness of breath, or wheezing? No   7.  Do you sometimes get pains in the calves of your legs when you walk? No   8. Do you or anyone in your family have previous history of blood clots? No   9.  Do you or does anyone in your family have a serious bleeding problem such as prolonged bleeding following surgeries or cuts? No   10. Have you ever had problems with anemia or been told to take iron pills? No   11. Have you had any abnormal blood loss such as black, tarry or bloody stools? No   12. Have you ever had a blood  transfusion? UNKNOWN -    13. Have you or any of your relatives ever had problems with anesthesia? No   14. Do you have sleep apnea, excessive snoring or daytime drowsiness? No   15. Do you have any prosthetic heart valves? No   16. Do you have prosthetic joints? YES - Knees         HPI:     HPI related to upcoming procedure: Patient with history of carpal tunnel scheduled to have a right carpal tunnel release.  He reports he had his left side done over a year ago.    Recently, he had both of his knee replaced, he had a total knee arthroplasty bilaterally he had no complication.  No complication with anesthesia.  No bleeding and no blood transfusion.    History of hypertension, dyslipidemia being controlled.    Denies history of heart disease, no chest pain, no shortness of breath.  He is very active.  No recent sickness or travel      See problem list for active medical problems.  Problems all longstanding and stable, except as noted/documented.  See ROS for pertinent symptoms related to these conditions.      MEDICAL HISTORY:     Patient Active Problem List    Diagnosis Date Noted     Morbid obesity (H) 11/02/2017     Priority: Medium     Non morbid obesity due to excess calories 09/14/2017     Priority: Medium     Advanced directives, counseling/discussion 09/02/2016     Priority: Medium     Advance Care Planning 9/2/2016: ACP Review of Chart / Resources Provided:  Reviewed chart for advance care plan.  Thien Zarate has no plan or code status on file. Discussed available resources and provided with information. Confirmed code status reflects current choices pending further ACP discussions.  Confirmed/documented legally designated decision makers.  Added by Yessenia Diez             Hyperlipidemia LDL goal <130 06/04/2013     Priority: Medium     Hypertension goal BP (blood pressure) < 140/90 08/25/2011     Priority: Medium      Past Medical History:   Diagnosis Date     Arthritis      Gout      Hypertension       Past Surgical History:   Procedure Laterality Date     DISCECTOMY LUMBAR POSTERIOR MICROSCOPIC ONE LEVEL Left 3/24/2015    Procedure: DISCECTOMY LUMBAR POSTERIOR MICROSCOPIC ONE LEVEL;  Surgeon: Cricket Stone MD;  Location: [facility] OR     HC SACROPLASTY      and also laminectomy, unsure what level     Left MMT arthroscopy  2012     ORTHOPEDIC SURGERY  2011    Rt knee     ORTHOPEDIC SURGERY  3/9/2012    Lt knee     Rt MMT arthroscopy       Current Outpatient Medications   Medication Sig Dispense Refill     Acetaminophen (TYLENOL 8 HOUR PO) Take  by mouth. As needed       amLODIPine (NORVASC) 10 MG tablet Take 1 tablet (10 mg) by mouth daily 30 tablet 5     aspirin (ASA) 325 MG tablet Take 325 mg by mouth daily       carvedilol (COREG) 25 MG tablet TAKE 1 TABLET(25 MG) BY MOUTH TWICE DAILY WITH MEALS 60 tablet 0     indomethacin (INDOCIN) 25 MG capsule TAKE 2 CAPSULE BY MOUTH TWICE DAILY AS NEEDED WITH MEALS 30 capsule 0     lisinopril-hydrochlorothiazide (PRINZIDE/ZESTORETIC) 20-12.5 MG tablet Take 2 tablets by mouth daily 60 tablet 5     Omega-3 Fatty Acids (FISH OIL PO) Take 1,000 mg by mouth 3 TABLETS DAILY       carvedilol (COREG) 25 MG tablet TAKE 1 TABLET(25 MG) BY MOUTH TWICE DAILY WITH MEALS (Patient not taking: Reported on 10/10/2019) 180 tablet 1     OTC products: None, except as noted above    Allergies   Allergen Reactions     Penicillins Unknown     Other reaction(s): *Unknown - Childhood Rxn     Shellfish Allergy       Latex Allergy: NO    Social History     Tobacco Use     Smoking status: Former Smoker     Years: 30.00     Types: Cigars     Last attempt to quit: 6/3/1979     Years since quittin.3     Smokeless tobacco: Never Used   Substance Use Topics     Alcohol use: Yes     History   Drug Use No     Comment: Hx of...       REVIEW OF SYSTEMS:   CONSTITUTIONAL: NEGATIVE for fever, chills, change in weight  INTEGUMENTARY/SKIN: NEGATIVE for worrisome rashes, moles or lesions  EYES:  "NEGATIVE for vision changes or irritation  ENT/MOUTH: NEGATIVE for ear, mouth and throat problems  RESP: NEGATIVE for significant cough or SOB  BREAST: NEGATIVE for masses, tenderness or discharge  CV: NEGATIVE for chest pain, palpitations or peripheral edema  GI: NEGATIVE for nausea, abdominal pain, heartburn, or change in bowel habits  : NEGATIVE for frequency, dysuria, or hematuria  MUSCULOSKELETAL: NEGATIVE for significant arthralgias or myalgia  NEURO: NEGATIVE for weakness, dizziness or paresthesias  ENDOCRINE: NEGATIVE for temperature intolerance, skin/hair changes  HEME: NEGATIVE for bleeding problems  PSYCHIATRIC: NEGATIVE for changes in mood or affect    EXAM:   /74 (BP Location: Right arm, Patient Position: Chair, Cuff Size: Adult Large)   Pulse 65   Temp 97.3  F (36.3  C) (Oral)   Ht 1.86 m (6' 1.23\")   Wt 117.5 kg (259 lb)   SpO2 97%   BMI 33.96 kg/m      GENERAL APPEARANCE: healthy, alert and no distress     EYES: EOMI,  PERRL     HENT: ear canals and TM's normal and nose and mouth without ulcers or lesions     NECK: no adenopathy, no asymmetry, masses, or scars and thyroid normal to palpation     RESP: lungs clear to auscultation - no rales, rhonchi or wheezes     CV: regular rates and rhythm, normal S1 S2, no S3 or S4 and no murmur, click or rub     ABDOMEN:  soft, nontender, no HSM or masses and bowel sounds normal     MS: extremities normal- no gross deformities noted, no evidence of inflammation in joints, FROM in all extremities.     SKIN: no suspicious lesions or rashes     NEURO: Normal strength and tone, sensory exam grossly normal, mentation intact and speech normal     PSYCH: mentation appears normal. and affect normal/bright     LYMPHATICS: No cervical adenopathy    DIAGNOSTICS:   EKG:  ( 09/2018 ): appears normal, NSR, normal axis, normal intervals, no acute ST/T changes c/w ischemia, no LVH by voltage criteria, unchanged from previous tracings    Recent Labs   Lab Test " 07/25/19  1247 10/29/18  1731 09/14/17  1756  03/06/12  1825   HGB 14.4  --  14.7   < > 14.1     --  222   < > 186    142 142   < > 142   POTASSIUM 3.4 3.6 3.3*   < > 3.8   CR 0.71 0.65* 0.66   < > 0.79   A1C  --   --   --   --  5.3    < > = values in this interval not displayed.        IMPRESSION:   Reason for surgery/procedure: Right Carpal tunnel release  Diagnosis/reason for consult: Right hand / wrist pain and numbness, Carpal tunnel syndrome.    The proposed surgical procedure is considered INTERMEDIATE risk.    REVISED CARDIAC RISK INDEX  The patient has the following serious cardiovascular risks for perioperative complications such as (MI, PE, VFib and 3  AV Block):  No serious cardiac risks  INTERPRETATION: 0 risks: Class I (very low risk - 0.4% complication rate)    The patient has the following additional risks for perioperative complications:  No identified additional risks     Diagnosis Comments   1. Preop general physical exam     2. Right carpal tunnel syndrome     3. Hyperlipidemia LDL goal <130     4. Hypertension goal BP (blood pressure) < 140/90     5. Morbid obesity (H)     6. History of gout       RECOMMENDATIONS:     --Consult hospital rounder / IM to assist post-op medical management    --Patient is to take all scheduled medications on the day of surgery EXCEPT for modifications listed below.    APPROVAL GIVEN to proceed with proposed procedure, without further diagnostic evaluation       Signed Electronically by: Nahid Harris MD  Patient Instructions     Before Your Surgery      Call your surgeon if there is any change in your health. This includes signs of a cold or flu (such as a sore throat, runny nose, cough, rash or fever).    Do not smoke, drink alcohol or take over the counter medicine (unless your surgeon or primary care doctor tells you to) for the 24 hours before and after surgery.    If you take prescribed drugs: Follow your doctor s orders about which medicines  to take and which to stop until after surgery.    Eating and drinking prior to surgery: follow the instructions from your surgeon    Take a shower or bath the night before surgery. Use the soap your surgeon gave you to gently clean your skin. If you do not have soap from your surgeon, use your regular soap. Do not shave or scrub the surgery site.  Wear clean pajamas and have clean sheets on your bed.     Nothing to eat or drink after Midnight.  Hold all NSAID and blood thinner 7 days, before surgery ( Aspirin, Ibuprofen, Naproxen, etc, ), and Coumadin.  May take blood pressure medication, the morning of your surgery with sip of water, as directed.        Copy of this evaluation report is provided to requesting physician.    [facility] Preop Guidelines    Revised Cardiac Risk Index

## 2019-10-16 ENCOUNTER — TELEPHONE (OUTPATIENT)
Dept: FAMILY MEDICINE | Facility: CLINIC | Age: 59
End: 2019-10-16

## 2019-10-16 NOTE — TELEPHONE ENCOUNTER
Forms received from: [employer]   Phone number listed: [phone]   Fax listed: [phone]  Date received: 10/16/2019  Form description: Ok for late SOC-08/15/19  Once forms are completed, please return to [facility] via fax.  Is patient requesting to be contacted when forms are completed: NA    Form placed: In provider's basket  Mari Chiang

## 2019-10-31 ENCOUNTER — TELEPHONE (OUTPATIENT)
Dept: FAMILY MEDICINE | Facility: CLINIC | Age: 59
End: 2019-10-31

## 2019-10-31 NOTE — TELEPHONE ENCOUNTER
Forms received from: [facility]   Phone number listed: [phone]   Fax listed: [phone]  Date received: 10/31/2019  Form description: skilled nurse patient missed visit  Once forms are completed, please return to [facility] via fax.  Form placed: in providers folder  Margarita Ivey

## 2019-11-06 ENCOUNTER — TRANSFERRED RECORDS (OUTPATIENT)
Dept: HEALTH INFORMATION MANAGEMENT | Facility: CLINIC | Age: 59
End: 2019-11-06

## 2019-11-11 ENCOUNTER — TELEPHONE (OUTPATIENT)
Dept: FAMILY MEDICINE | Facility: CLINIC | Age: 59
End: 2019-11-11

## 2019-11-11 ENCOUNTER — TRANSFERRED RECORDS (OUTPATIENT)
Dept: HEALTH INFORMATION MANAGEMENT | Facility: CLINIC | Age: 59
End: 2019-11-11

## 2019-11-11 NOTE — TELEPHONE ENCOUNTER
Forms received from: [facility]   Phone number listed: [phone]   Fax listed: [phone]  Date received: 11/11/2019  Form description: home care discontinue summary  Once forms are completed, please return to [facility] via fax.  Form placed: in providers folder  Margarita Ivey

## 2019-12-09 ENCOUNTER — TRANSFERRED RECORDS (OUTPATIENT)
Dept: HEALTH INFORMATION MANAGEMENT | Facility: CLINIC | Age: 59
End: 2019-12-09

## 2019-12-25 DIAGNOSIS — I10 ESSENTIAL HYPERTENSION WITH GOAL BLOOD PRESSURE LESS THAN 140/90: ICD-10-CM

## 2019-12-26 RX ORDER — AMLODIPINE BESYLATE 10 MG/1
TABLET ORAL
Qty: 30 TABLET | Refills: 5 | Status: SHIPPED | OUTPATIENT
Start: 2019-12-26 | End: 2020-03-27

## 2019-12-26 NOTE — TELEPHONE ENCOUNTER
"Requested Prescriptions   Pending Prescriptions Disp Refills     amLODIPine (NORVASC) 10 MG tablet [Pharmacy Med Name: AMLODIPINE BESYLATE 10MG TABLETS] 30 tablet 5     Sig: TAKE 1 TABLET(10 MG) BY MOUTH DAILY   Last Written Prescription Date:  6-10-19  Last Fill Quantity: 30,  # refills: 5   Last office visit: 10/10/2019 with prescribing provider:  8-22-19   Future Office Visit:        Calcium Channel Blockers Protocol  Passed - 12/25/2019  3:46 AM        Passed - Blood pressure under 140/90 in past 12 months     BP Readings from Last 3 Encounters:   10/10/19 109/74   08/22/19 122/64   07/25/19 118/79                 Passed - Recent (12 mo) or future (30 days) visit within the authorizing provider's specialty     Patient has had an office visit with the authorizing provider or a provider within the authorizing providers department within the previous 12 mos or has a future within next 30 days. See \"Patient Info\" tab in inbasket, or \"Choose Columns\" in Meds & Orders section of the refill encounter.              Passed - Medication is active on med list        Passed - Patient is age 18 or older        Passed - Normal serum creatinine on file in past 12 months     Recent Labs   Lab Test 07/25/19  1247   CR 0.71               "

## 2019-12-26 NOTE — TELEPHONE ENCOUNTER
Prescription approved per [facility] Refill Protocol.    Rody Plascencia, RN, BSN, PHN  [facility]: [place]

## 2019-12-27 DIAGNOSIS — I10 ESSENTIAL HYPERTENSION WITH GOAL BLOOD PRESSURE LESS THAN 140/90: ICD-10-CM

## 2019-12-27 RX ORDER — LISINOPRIL AND HYDROCHLOROTHIAZIDE 12.5; 2 MG/1; MG/1
2 TABLET ORAL DAILY
Qty: 60 TABLET | Refills: 3 | Status: SHIPPED | OUTPATIENT
Start: 2019-12-27 | End: 2020-04-27

## 2019-12-27 NOTE — TELEPHONE ENCOUNTER
Routing refill request to provider for review/approval because:  Drug interaction warning          Rody Plascencia RN, BSN, PHN  [facility]: [place]

## 2019-12-27 NOTE — TELEPHONE ENCOUNTER
"Requested Prescriptions   Pending Prescriptions Disp Refills     lisinopril-hydrochlorothiazide (PRINZIDE/ZESTORETIC) 20-12.5 MG tablet [Pharmacy Med Name: LISINOPRIL-HCTZ 20/12.5MG TABLETS] 60 tablet 5     Sig: TAKE 2 TABLETS BY MOUTH DAILY   Last Written Prescription Date:  6-10-19  Last Fill Quantity: 60,  # refills: 5   Last office visit: 10/10/2019 with prescribing provider:  8-22-19   Future Office Visit:        Diuretics (Including Combos) Protocol Passed - 12/27/2019 12:05 PM        Passed - Blood pressure under 140/90 in past 12 months     BP Readings from Last 3 Encounters:   10/10/19 109/74   08/22/19 122/64   07/25/19 118/79                 Passed - Recent (12 mo) or future (30 days) visit within the authorizing provider's specialty     Patient has had an office visit with the authorizing provider or a provider within the authorizing providers department within the previous 12 mos or has a future within next 30 days. See \"Patient Info\" tab in inbasket, or \"Choose Columns\" in Meds & Orders section of the refill encounter.              Passed - Medication is active on med list        Passed - Patient is age 18 or older        Passed - Normal serum creatinine on file in past 12 months     Recent Labs   Lab Test 07/25/19  1247   CR 0.71              Passed - Normal serum potassium on file in past 12 months     Recent Labs   Lab Test 07/25/19  1247   POTASSIUM 3.4                    Passed - Normal serum sodium on file in past 12 months     Recent Labs   Lab Test 07/25/19  1247                   "

## 2020-01-28 ENCOUNTER — TELEPHONE (OUTPATIENT)
Dept: FAMILY MEDICINE | Facility: CLINIC | Age: 60
End: 2020-01-28

## 2020-01-28 DIAGNOSIS — I10 HYPERTENSION GOAL BP (BLOOD PRESSURE) < 140/90: ICD-10-CM

## 2020-01-28 NOTE — TELEPHONE ENCOUNTER
Reason for Call:  Other call back    Detailed comments: Patient states that he will be going on Cobra for one month.  He states he can't afford to get his meds then for next month and wants a refill on his lisinopril; amlodipine; and the carvedilol.  Pt was told that the lisinopril and amlodipine has more refills left and that the carvedilol would need another refill. I believe the patient wants his meds filled this month, before next month when he is on COBRA otherwise he can't afford to take his meds.  Please give him a call to discuss and advise.    Phone Number Patient can be reached at: Cell number on file:    Telephone Information:   Mobile [phone]       Best Time: a nytime    Can we leave a detailed message on this number? YES    Call taken on 1/28/2020 at 12:57 PM by Tiana Flood

## 2020-01-30 RX ORDER — CARVEDILOL 25 MG/1
TABLET ORAL
Qty: 60 TABLET | Refills: 3 | Status: SHIPPED | OUTPATIENT
Start: 2020-01-30 | End: 2020-06-03

## 2020-01-30 NOTE — TELEPHONE ENCOUNTER
Attempt # 1  Called [phone] (home)     Did patient answer the phone: No, left a message on voicemail to return call to triage line at [phone].    Yamile PerezRN,BSN  [facility]

## 2020-01-30 NOTE — TELEPHONE ENCOUNTER
I have sent over the refill for carvedilol.  He will have to discuss whether EKG get any of these through his current insurance.  If he has problems getting meds filled he can apply for assistance through [facility] [place] if he lives in [facility].  I believe they will give him 300 dollars for this.  Social work can give him the info on how to apply.

## 2020-01-31 NOTE — TELEPHONE ENCOUNTER
Attempt # 2  Called [phone] (home)     Did patient answer the phone: No, left a message on voicemail to return call to triage line at [phone].    Yamile PerezRN,BSN  [facility]

## 2020-02-03 NOTE — TELEPHONE ENCOUNTER
I called and spoke to patient by phone.   He states he can't afford the COBRA insurance after his work insurance ended (he is not employed).    He says he has since applied for and was accepted by [facility] so will get meds covered by them.   I advised he has refills at pharmacy of all the requested meds now, call pharmacy directly for refills.      Patient verbalized understanding of and agreement with plan.    Anastacia Ulrich RN  [facility]

## 2020-02-04 ENCOUNTER — TRANSFERRED RECORDS (OUTPATIENT)
Dept: HEALTH INFORMATION MANAGEMENT | Facility: CLINIC | Age: 60
End: 2020-02-04

## 2020-02-24 ENCOUNTER — HEALTH MAINTENANCE LETTER (OUTPATIENT)
Age: 60
End: 2020-02-24

## 2020-03-06 ENCOUNTER — TRANSFERRED RECORDS (OUTPATIENT)
Dept: HEALTH INFORMATION MANAGEMENT | Facility: CLINIC | Age: 60
End: 2020-03-06

## 2020-04-14 ENCOUNTER — TELEPHONE (OUTPATIENT)
Dept: BEHAVIORAL HEALTH | Facility: CLINIC | Age: 60
End: 2020-04-14

## 2020-04-14 NOTE — TELEPHONE ENCOUNTER
Community Paramedic Social Needs Outreach  [facility]/Voicemail       Clinical Data: Community Paramedic Outreach    Outreach attempted x 1.      Left message on patient's voicemail with call back information and requested return call.    Community Paramedic Plan will try to reach patient again in 1-2 business days.

## 2020-04-17 ENCOUNTER — TELEPHONE (OUTPATIENT)
Dept: BEHAVIORAL HEALTH | Facility: CLINIC | Age: 60
End: 2020-04-17

## 2020-04-17 NOTE — TELEPHONE ENCOUNTER
Community Paramedic Social Needs Outreach  [facility]/Voicemail       Clinical Data: Community Paramedic Outreach    Outreach attempted x 2.      Left message on patient's voicemail with call back information and requested return call.    Community Paramedic Plan will do no further outreaches at this time.

## 2020-04-27 DIAGNOSIS — I10 ESSENTIAL HYPERTENSION WITH GOAL BLOOD PRESSURE LESS THAN 140/90: ICD-10-CM

## 2020-04-27 RX ORDER — LISINOPRIL AND HYDROCHLOROTHIAZIDE 12.5; 2 MG/1; MG/1
2 TABLET ORAL DAILY
Qty: 60 TABLET | Refills: 3 | Status: SHIPPED | OUTPATIENT
Start: 2020-04-27 | End: 2020-06-03

## 2020-04-27 NOTE — TELEPHONE ENCOUNTER
"Last Written Prescription Date:  12-27-19  Last Fill Quantity: 60,  # refills: 3   Last office visit: 10/10/2019 with prescribing provider:  8-22-9   Future Office Visit:  Requested Prescriptions   Pending Prescriptions Disp Refills     lisinopril-hydrochlorothiazide (ZESTORETIC) 20-12.5 MG tablet 60 tablet 3     Sig: Take 2 tablets by mouth daily       Diuretics (Including Combos) Protocol Passed - 4/27/2020  1:44 PM        Passed - Blood pressure under 140/90 in past 12 months     BP Readings from Last 3 Encounters:   10/10/19 109/74   08/22/19 122/64   07/25/19 118/79                 Passed - Recent (12 mo) or future (30 days) visit within the authorizing provider's specialty     Patient has had an office visit with the authorizing provider or a provider within the authorizing providers department within the previous 12 mos or has a future within next 30 days. See \"Patient Info\" tab in inbasket, or \"Choose Columns\" in Meds & Orders section of the refill encounter.              Passed - Medication is active on med list        Passed - Patient is age 18 or older        Passed - Normal serum creatinine on file in past 12 months     Recent Labs   Lab Test 07/25/19  1247   CR 0.71              Passed - Normal serum potassium on file in past 12 months     Recent Labs   Lab Test 07/25/19  1247   POTASSIUM 3.4                    Passed - Normal serum sodium on file in past 12 months     Recent Labs   Lab Test 07/25/19  1247                ACE Inhibitors (Including Combos) Protocol Passed - 4/27/2020  1:44 PM        Passed - Blood pressure under 140/90 in past 12 months     BP Readings from Last 3 Encounters:   10/10/19 109/74   08/22/19 122/64   07/25/19 118/79                 Passed - Recent (12 mo) or future (30 days) visit within the authorizing provider's specialty     Patient has had an office visit with the authorizing provider or a provider within the authorizing providers department within the previous 12 " "mos or has a future within next 30 days. See \"Patient Info\" tab in inbasket, or \"Choose Columns\" in Meds & Orders section of the refill encounter.              Passed - Medication is active on med list        Passed - Patient is age 18 or older        Passed - Normal serum creatinine on file in past 12 months     Recent Labs   Lab Test 07/25/19  1247   CR 0.71       Ok to refill medication if creatinine is low          Passed - Normal serum potassium on file in past 12 months     Recent Labs   Lab Test 07/25/19  1247   POTASSIUM 3.4                "

## 2020-05-20 ENCOUNTER — TRANSFERRED RECORDS (OUTPATIENT)
Dept: HEALTH INFORMATION MANAGEMENT | Facility: CLINIC | Age: 60
End: 2020-05-20

## 2020-05-20 ENCOUNTER — VIRTUAL VISIT (OUTPATIENT)
Dept: FAMILY MEDICINE | Facility: CLINIC | Age: 60
End: 2020-05-20
Payer: COMMERCIAL

## 2020-05-20 DIAGNOSIS — R60.0 BILATERAL LOWER EXTREMITY EDEMA: Primary | ICD-10-CM

## 2020-05-20 DIAGNOSIS — R73.01 IMPAIRED FASTING GLUCOSE: ICD-10-CM

## 2020-05-20 DIAGNOSIS — I10 HYPERTENSION GOAL BP (BLOOD PRESSURE) < 140/90: ICD-10-CM

## 2020-05-20 DIAGNOSIS — E66.811 OBESITY (BMI 30.0-34.9): ICD-10-CM

## 2020-05-20 PROBLEM — E66.01 MORBID OBESITY (H): Status: RESOLVED | Noted: 2017-11-02 | Resolved: 2020-05-20

## 2020-05-20 PROBLEM — E66.09 NON MORBID OBESITY DUE TO EXCESS CALORIES: Status: RESOLVED | Noted: 2017-09-14 | Resolved: 2020-05-20

## 2020-05-20 PROCEDURE — 99214 OFFICE O/P EST MOD 30 MIN: CPT | Mod: 95 | Performed by: FAMILY MEDICINE

## 2020-05-20 RX ORDER — CARVEDILOL 25 MG/1
TABLET ORAL
Qty: 60 TABLET | Refills: 3 | Status: CANCELLED | OUTPATIENT
Start: 2020-05-20

## 2020-05-20 RX ORDER — FUROSEMIDE 20 MG
20 TABLET ORAL DAILY
Qty: 30 TABLET | Refills: 1 | Status: SHIPPED | OUTPATIENT
Start: 2020-05-20 | End: 2021-06-16

## 2020-05-20 RX ORDER — AMLODIPINE BESYLATE 10 MG/1
5 TABLET ORAL DAILY
Qty: 90 TABLET | Refills: 0 | COMMUNITY
Start: 2020-05-20 | End: 2020-06-03 | Stop reason: DRUGHIGH

## 2020-05-20 NOTE — PROGRESS NOTES
"Thien Zarate is a 59 year old male who is being evaluated via a billable telephone visit.      The patient has been notified of following:     \"This telephone visit will be conducted via a call between you and your physician/provider. We have found that certain health care needs can be provided without the need for a physical exam.  This service lets us provide the care you need with a short phone conversation.  If a prescription is necessary we can send it directly to your pharmacy.  If lab work is needed we can place an order for that and you can then stop by our lab to have the test done at a later time.    Telephone visits are billed at different rates depending on your insurance coverage. During this emergency period, for some insurers they may be billed the same as an in-person visit.  Please reach out to your insurance provider with any questions.    If during the course of the call the physician/provider feels a telephone visit is not appropriate, you will not be charged for this service.\"    Patient has given verbal consent for Telephone visit?  Yes    What phone number would you like to be contacted at? [phone]    How would you like to obtain your AVS? [facility]    Subjective     Thien Zarate is a 59 year old male who presents via phone visit today for the following health issues:    HPI  Bilateral leg swelling below knees      Duration: Off and on since August. Last week it was bad. Had a follow up with [facility] ortho today and was told to follow up with PCP.    Description (location/character/radiation): off and on since bilateral knee replacement in August 2019    Intensity:  Moderate -- last week he wasn't able to put on his work boots.     Accompanying signs and symptoms:     History (similar episodes/previous evaluation): Off and on swelling    Precipitating or alleviating factors:     Therapies tried and outcome: Elevation, compression socks that he wears occasionally. Doesn't seem " like the swelling gets better.        He actually has had leg swelling for a number of years, even prior to his knee surgery last summer.  Lower leg swelling gets better at times and worse at times.  It is symmetric.  No unusual shortness of breath.  He does have obesity.  He is on baseline medications as below, including amlodipine 10 mg daily.    Patient Active Problem List   Diagnosis     Hypertension goal BP (blood pressure) < 140/90     Hyperlipidemia LDL goal <130     Advanced directives, counseling/discussion     Non morbid obesity due to excess calories     Morbid obesity (H)     History of gout     Past Surgical History:   Procedure Laterality Date     AS TOTAL KNEE ARTHROPLASTY Bilateral 2019     DISCECTOMY LUMBAR POSTERIOR MICROSCOPIC ONE LEVEL Left 3/24/2015    Procedure: DISCECTOMY LUMBAR POSTERIOR MICROSCOPIC ONE LEVEL;  Surgeon: Cricket Stone MD;  Location: [facility] OR     HC SACROPLASTY      and also laminectomy, unsure what level     left carpal tunnel release  10/2018     Left MMT arthroscopy  2012     ORTHOPEDIC SURGERY  2011    Rt knee     ORTHOPEDIC SURGERY  3/9/2012    Lt knee     Rt MMT arthroscopy         Social History     Tobacco Use     Smoking status: Former Smoker     Years: 30.00     Types: Cigars     Last attempt to quit: 6/3/1979     Years since quittin.9     Smokeless tobacco: Never Used   Substance Use Topics     Alcohol use: Yes     Family History   Problem Relation Age of Onset     Cardiovascular Father      Heart Disease Sister          Current Outpatient Medications   Medication Sig Dispense Refill     Acetaminophen (TYLENOL 8 HOUR PO) Take  by mouth. As needed       amLODIPine (NORVASC) 10 MG tablet TAKE 1 TABLET(10 MG) BY MOUTH DAILY 90 tablet 1     aspirin (ASA) 325 MG tablet Take 325 mg by mouth daily       carvedilol (COREG) 25 MG tablet TAKE 1 TABLET(25 MG) BY MOUTH TWICE DAILY WITH MEALS 60 tablet 3     indomethacin (INDOCIN) 25 MG capsule TAKE 2 CAPSULE BY  "MOUTH TWICE DAILY AS NEEDED WITH MEALS 30 capsule 0     lisinopril-hydrochlorothiazide (ZESTORETIC) 20-12.5 MG tablet Take 2 tablets by mouth daily 60 tablet 3     Allergies   Allergen Reactions     Penicillins Unknown     Other reaction(s): *Unknown - Childhood Rxn     Shellfish Allergy        Reviewed and updated as needed this visit by Provider         Review of Systems   Constitutional, HEENT, cardiovascular, pulmonary, gi and gu systems are negative, except as otherwise noted.       Objective   Reported vitals:  There were no vitals taken for this visit.     This visit is being conducted \"virtually\" via telephone rather than \"in person\" because of the current nationwide COVID-19 pandemic and the desire to limit potential exposures to illness for patients.    PSYCH: Alert and oriented times 3; coherent speech, normal   rate and volume, able to articulate logical thoughts, able   to abstract reason, no tangential thoughts, no hallucinations   or delusions  His affect is normal  RESP: No cough, no audible wheezing, able to talk in full sentences  Remainder of exam unable to be completed due to telephone visits    Diagnostic Test Results:  Labs reviewed in Epic        Assessment/Plan:    ICD-10-CM    1. Bilateral lower extremity edema  R60.0 furosemide (LASIX) 20 MG tablet   2. Hypertension goal BP (blood pressure) < 140/90  I10    3. Obesity (BMI 30.0-34.9)  E66.9    4. Impaired fasting glucose  R73.01      I suspect his leg swelling is multifactorial, but mainly related to his use of the amlodipine at 10 mg daily and also his obesity  We discussed options for treatment and I suggested he cut back the amlodipine to 5 mg daily and we will start him on furosemide 20 mg daily  Continue same carvedilol and lisinopril HCTZ  Plan a recheck in the office in 2 weeks for a face-to-face visit along with some fasting lab work    No follow-ups on file.      Phone call duration:  11 minutes    Amaury Solares MD      "

## 2020-06-03 ENCOUNTER — OFFICE VISIT (OUTPATIENT)
Dept: FAMILY MEDICINE | Facility: CLINIC | Age: 60
End: 2020-06-03
Payer: COMMERCIAL

## 2020-06-03 VITALS
DIASTOLIC BLOOD PRESSURE: 86 MMHG | SYSTOLIC BLOOD PRESSURE: 134 MMHG | BODY MASS INDEX: 36.1 KG/M2 | TEMPERATURE: 98 F | HEART RATE: 67 BPM | OXYGEN SATURATION: 98 % | WEIGHT: 275.31 LBS

## 2020-06-03 DIAGNOSIS — R73.01 IMPAIRED FASTING GLUCOSE: ICD-10-CM

## 2020-06-03 DIAGNOSIS — E78.5 HYPERLIPIDEMIA LDL GOAL <100: ICD-10-CM

## 2020-06-03 DIAGNOSIS — E66.01 MORBID OBESITY (H): ICD-10-CM

## 2020-06-03 DIAGNOSIS — Z11.4 SCREENING FOR HIV (HUMAN IMMUNODEFICIENCY VIRUS): ICD-10-CM

## 2020-06-03 DIAGNOSIS — Z96.653 HISTORY OF TOTAL KNEE ARTHROPLASTY, BILATERAL: ICD-10-CM

## 2020-06-03 DIAGNOSIS — R60.0 BILATERAL LEG EDEMA: Primary | ICD-10-CM

## 2020-06-03 DIAGNOSIS — Z87.39 HISTORY OF GOUT: ICD-10-CM

## 2020-06-03 DIAGNOSIS — Z86.0100 HISTORY OF COLONIC POLYPS: ICD-10-CM

## 2020-06-03 DIAGNOSIS — I10 HYPERTENSION GOAL BP (BLOOD PRESSURE) < 140/90: ICD-10-CM

## 2020-06-03 PROBLEM — E66.811 OBESITY (BMI 30.0-34.9): Status: RESOLVED | Noted: 2020-05-20 | Resolved: 2020-06-03

## 2020-06-03 LAB
ERYTHROCYTE [DISTWIDTH] IN BLOOD BY AUTOMATED COUNT: 13.2 % (ref 10–15)
HBA1C MFR BLD: 5.6 % (ref 0–5.6)
HCT VFR BLD AUTO: 42.2 % (ref 40–53)
HGB BLD-MCNC: 14.3 G/DL (ref 13.3–17.7)
MCH RBC QN AUTO: 30 PG (ref 26.5–33)
MCHC RBC AUTO-ENTMCNC: 33.9 G/DL (ref 31.5–36.5)
MCV RBC AUTO: 89 FL (ref 78–100)
PLATELET # BLD AUTO: 181 10E9/L (ref 150–450)
RBC # BLD AUTO: 4.77 10E12/L (ref 4.4–5.9)
WBC # BLD AUTO: 5 10E9/L (ref 4–11)

## 2020-06-03 PROCEDURE — 80061 LIPID PANEL: CPT | Performed by: FAMILY MEDICINE

## 2020-06-03 PROCEDURE — 99214 OFFICE O/P EST MOD 30 MIN: CPT | Performed by: FAMILY MEDICINE

## 2020-06-03 PROCEDURE — 80053 COMPREHEN METABOLIC PANEL: CPT | Performed by: FAMILY MEDICINE

## 2020-06-03 PROCEDURE — 85027 COMPLETE CBC AUTOMATED: CPT | Performed by: FAMILY MEDICINE

## 2020-06-03 PROCEDURE — 84550 ASSAY OF BLOOD/URIC ACID: CPT | Performed by: FAMILY MEDICINE

## 2020-06-03 PROCEDURE — 36415 COLL VENOUS BLD VENIPUNCTURE: CPT | Performed by: FAMILY MEDICINE

## 2020-06-03 PROCEDURE — 83036 HEMOGLOBIN GLYCOSYLATED A1C: CPT | Performed by: FAMILY MEDICINE

## 2020-06-03 RX ORDER — LISINOPRIL AND HYDROCHLOROTHIAZIDE 12.5; 2 MG/1; MG/1
2 TABLET ORAL DAILY
Qty: 60 TABLET | Refills: 5 | Status: SHIPPED | OUTPATIENT
Start: 2020-06-03 | End: 2021-03-11

## 2020-06-03 RX ORDER — AMLODIPINE BESYLATE 5 MG/1
5 TABLET ORAL DAILY
Qty: 30 TABLET | Refills: 5 | Status: SHIPPED | OUTPATIENT
Start: 2020-06-03 | End: 2021-04-05

## 2020-06-03 RX ORDER — CARVEDILOL 25 MG/1
TABLET ORAL
Qty: 60 TABLET | Refills: 5 | Status: SHIPPED | OUTPATIENT
Start: 2020-06-03 | End: 2021-02-01

## 2020-06-03 ASSESSMENT — PAIN SCALES - GENERAL: PAINLEVEL: MILD PAIN (3)

## 2020-06-03 NOTE — PROGRESS NOTES
Subjective     Thien Zarate is a 59 year old male who presents to clinic today for the following health issues:    HPI   Edema      Duration: Ongoing after knee replacement in August     Description (location/character/radiation): Bilateral knees    Intensity:  3/10    Accompanying signs and symptoms: Swelling, pain    History (similar episodes/previous evaluation): Ongoing    Precipitating or alleviating factors: Walking makes it worse    Therapies tried and outcome: Elevation, ice packs     Jennifer Grissom MA    I had spoken with the patient 2 weeks ago by phone.  He was having a lot of lower leg swelling.  We reduced his amlodipine from 10 mg daily to 5 mg daily and started him on furosemide 20 mg daily.  His leg swelling is much improved now.  It has almost resolved.  He did have bilateral knee replacements last August and he has been having some leg swelling since then.  He remains on other medication as below.  He is a new patient to me.  I see that he is overdue for colonoscopy.  He has a history of impaired fasting glucose and gout.  He is on HCTZ along with the lisinopril for hypertension, which could contribute to those conditions.  He has not been having any gout episodes recently that he is aware of.    Patient Active Problem List   Diagnosis     Hypertension goal BP (blood pressure) < 140/90     Hyperlipidemia LDL goal <100     Advanced directives, counseling/discussion     History of gout     Impaired fasting glucose     Morbid obesity (H)     Past Surgical History:   Procedure Laterality Date     AS TOTAL KNEE ARTHROPLASTY Bilateral 08/2019     DISCECTOMY LUMBAR POSTERIOR MICROSCOPIC ONE LEVEL Left 3/24/2015    Procedure: DISCECTOMY LUMBAR POSTERIOR MICROSCOPIC ONE LEVEL;  Surgeon: Cricket Stone MD;  Location: [facility] OR     HC SACROPLASTY      and also laminectomy, unsure what level     left carpal tunnel release  10/2018     Left MMT arthroscopy  2012     ORTHOPEDIC SURGERY  12/2011    Rt knee      ORTHOPEDIC SURGERY  3/9/2012    Lt knee     Rt MMT arthroscopy         Social History     Tobacco Use     Smoking status: Former Smoker     Years: 30.00     Types: Cigars     Last attempt to quit: 6/3/1979     Years since quittin.0     Smokeless tobacco: Never Used   Substance Use Topics     Alcohol use: Yes     Family History   Problem Relation Age of Onset     Cardiovascular Father      Heart Disease Sister          Current Outpatient Medications   Medication Sig Dispense Refill     Acetaminophen (TYLENOL 8 HOUR PO) Take  by mouth. As needed       amLODIPine (NORVASC) 10 MG tablet Take 0.5 tablets (5 mg) by mouth daily 90 tablet 0     aspirin (ASA) 325 MG tablet Take 325 mg by mouth daily       carvedilol (COREG) 25 MG tablet TAKE 1 TABLET(25 MG) BY MOUTH TWICE DAILY WITH MEALS 60 tablet 3     furosemide (LASIX) 20 MG tablet Take 1 tablet (20 mg) by mouth daily 30 tablet 1     lisinopril-hydrochlorothiazide (ZESTORETIC) 20-12.5 MG tablet Take 2 tablets by mouth daily 60 tablet 3     indomethacin (INDOCIN) 25 MG capsule TAKE 2 CAPSULE BY MOUTH TWICE DAILY AS NEEDED WITH MEALS (Patient not taking: Reported on 6/3/2020) 30 capsule 0     Allergies   Allergen Reactions     Penicillins Unknown     Other reaction(s): *Unknown - Childhood Rxn     Shellfish Allergy        Reviewed and updated as needed this visit by Provider         Review of Systems   Constitutional, HEENT, cardiovascular, pulmonary, gi and gu systems are negative, except as otherwise noted.      Objective    /86 (BP Location: Left arm, Patient Position: Chair, Cuff Size: Adult Large)   Pulse 67   Temp 98  F (36.7  C) (Oral)   Wt 124.9 kg (275 lb 5 oz)   SpO2 98%   BMI 36.10 kg/m    Body mass index is 36.1 kg/m .  Physical Exam   GENERAL: alert, no distress and obese  EXT: He has only trace lower extremity edema now bilaterally.  He has well-healed scars over both knees from his previous TKAs.    Diagnostic Test Results:  Labs  "reviewed in Epic        Assessment & Plan       ICD-10-CM    1. Bilateral leg edema  R60.0    2. Hypertension goal BP (blood pressure) < 140/90  I10 CBC with platelets     Comprehensive metabolic panel     carvedilol (COREG) 25 MG tablet     lisinopril-hydrochlorothiazide (ZESTORETIC) 20-12.5 MG tablet     amLODIPine (NORVASC) 5 MG tablet   3. Impaired fasting glucose  R73.01 Hemoglobin A1c   4. Morbid obesity (H)  E66.01    5. Hyperlipidemia LDL goal <100  E78.5 Lipid panel reflex to direct LDL Fasting   6. History of gout  Z87.39 Uric acid   7. Screening for HIV (human immunodeficiency virus)  Z11.4 HIV Screening   8. History of colonic polyps  Z86.010 GASTROENTEROLOGY ADULT REF PROCEDURE ONLY   9. History of total knee arthroplasty, bilateral  Z96.653         BMI:   Estimated body mass index is 36.1 kg/m  as calculated from the following:    Height as of 10/10/19: 1.86 m (6' 1.23\").    Weight as of this encounter: 124.9 kg (275 lb 5 oz).   Weight management plan: Discussed healthy diet and exercise guidelines    His edema is much improved  We will have him finish out the 1 month supply of furosemide and then stop it  We will continue his amlodipine at the reduced dose of 5 mg daily  Continue same carvedilol  Discussed possibly discontinuing his HCTZ because of his history of impaired fasting glucose and gout, but we will check fasting labs today as above and see how those numbers look and continue his same lisinopril HCTZ for now (because he could use the HCTZ for blood pressure control and also for ongoing help of his edema going forward when he stops the furosemide)  Since he is about 2 years overdue for a colonoscopy, I advised him to proceed with that and he was agreeable to it, so a referral was made for that procedure  Plan a tentative recheck again in about 6 months    Return in about 6 months (around 12/3/2020) for BP Recheck.    Amaury Solares MD  [facility]    "

## 2020-06-04 LAB
ALBUMIN SERPL-MCNC: 4 G/DL (ref 3.4–5)
ALP SERPL-CCNC: 50 U/L (ref 40–150)
ALT SERPL W P-5'-P-CCNC: 27 U/L (ref 0–70)
ANION GAP SERPL CALCULATED.3IONS-SCNC: 9 MMOL/L (ref 3–14)
AST SERPL W P-5'-P-CCNC: 21 U/L (ref 0–45)
BILIRUB SERPL-MCNC: 0.7 MG/DL (ref 0.2–1.3)
BUN SERPL-MCNC: 16 MG/DL (ref 7–30)
CALCIUM SERPL-MCNC: 9.1 MG/DL (ref 8.5–10.1)
CHLORIDE SERPL-SCNC: 104 MMOL/L (ref 94–109)
CHOLEST SERPL-MCNC: 219 MG/DL
CO2 SERPL-SCNC: 28 MMOL/L (ref 20–32)
CREAT SERPL-MCNC: 0.57 MG/DL (ref 0.66–1.25)
GFR SERPL CREATININE-BSD FRML MDRD: >90 ML/MIN/{1.73_M2}
GLUCOSE SERPL-MCNC: 120 MG/DL (ref 70–99)
HDLC SERPL-MCNC: 51 MG/DL
LDLC SERPL CALC-MCNC: 124 MG/DL
NONHDLC SERPL-MCNC: 168 MG/DL
POTASSIUM SERPL-SCNC: 3.1 MMOL/L (ref 3.4–5.3)
PROT SERPL-MCNC: 7.3 G/DL (ref 6.8–8.8)
SODIUM SERPL-SCNC: 141 MMOL/L (ref 133–144)
TRIGL SERPL-MCNC: 218 MG/DL
URATE SERPL-MCNC: 9.9 MG/DL (ref 3.5–7.2)

## 2020-06-04 NOTE — RESULT ENCOUNTER NOTE
Andrew,  Your blood sugars and cholesterol values remain mildly elevated. Your potassium is a little low, probably from the water pill you have been taking. It might be best to stop that for now and just save the rest of them.   Your uric acid level is high, so we will have to watch for signs of gout in the future and perhaps make some medication adjustments if that becomes an issue.    Amaury Solares MD

## 2020-07-01 DIAGNOSIS — M1A.0710 IDIOPATHIC CHRONIC GOUT OF RIGHT FOOT WITHOUT TOPHUS: ICD-10-CM

## 2020-07-01 NOTE — TELEPHONE ENCOUNTER
Last Written Prescription Date:  9-5-19  Last Fill Quantity: 30,  # refills: 0   Last office visit: 6/3/2020 with prescribing provider:  6-3-2020   Future Office Visit:  Requested Prescriptions   Pending Prescriptions Disp Refills     indomethacin (INDOCIN) 25 MG capsule 30 capsule 0     Sig: TAKE 2 CAPSULE BY MOUTH TWICE DAILY AS NEEDED WITH MEALS       There is no refill protocol information for this order

## 2020-07-03 RX ORDER — INDOMETHACIN 25 MG/1
CAPSULE ORAL
Qty: 30 CAPSULE | Refills: 0 | Status: SHIPPED | OUTPATIENT
Start: 2020-07-03 | End: 2020-09-28

## 2020-07-15 DIAGNOSIS — Z11.59 ENCOUNTER FOR SCREENING FOR OTHER VIRAL DISEASES: Primary | ICD-10-CM

## 2020-07-29 RX ORDER — SODIUM, POTASSIUM,MAG SULFATES 17.5-3.13G
1 SOLUTION, RECONSTITUTED, ORAL ORAL SEE ADMIN INSTRUCTIONS
Qty: 2 BOTTLE | Refills: 0 | Status: SHIPPED | OUTPATIENT
Start: 2020-07-29 | End: 2021-05-05

## 2020-07-29 RX ORDER — BISACODYL 5 MG/1
5 TABLET, DELAYED RELEASE ORAL SEE ADMIN INSTRUCTIONS
Qty: 1 TABLET | Refills: 0 | Status: SHIPPED | OUTPATIENT
Start: 2020-07-29 | End: 2021-06-16

## 2020-08-10 DIAGNOSIS — Z11.59 ENCOUNTER FOR SCREENING FOR OTHER VIRAL DISEASES: ICD-10-CM

## 2020-08-10 LAB
SARS-COV-2 RNA SPEC QL NAA+PROBE: NOT DETECTED
SPECIMEN SOURCE: NORMAL

## 2020-08-10 PROCEDURE — U0003 INFECTIOUS AGENT DETECTION BY NUCLEIC ACID (DNA OR RNA); SEVERE ACUTE RESPIRATORY SYNDROME CORONAVIRUS 2 (SARS-COV-2) (CORONAVIRUS DISEASE [COVID-19]), AMPLIFIED PROBE TECHNIQUE, MAKING USE OF HIGH THROUGHPUT TECHNOLOGIES AS DESCRIBED BY CMS-2020-01-R: HCPCS | Performed by: INTERNAL MEDICINE

## 2020-08-13 ENCOUNTER — SURGERY (OUTPATIENT)
Age: 60
End: 2020-08-13
Payer: COMMERCIAL

## 2020-08-13 ENCOUNTER — HOSPITAL ENCOUNTER (OUTPATIENT)
Facility: AMBULATORY SURGERY CENTER | Age: 60
Discharge: HOME | End: 2020-08-13
Attending: INTERNAL MEDICINE | Admitting: INTERNAL MEDICINE
Payer: COMMERCIAL

## 2020-08-13 VITALS
DIASTOLIC BLOOD PRESSURE: 95 MMHG | OXYGEN SATURATION: 94 % | SYSTOLIC BLOOD PRESSURE: 147 MMHG | HEART RATE: 57 BPM | TEMPERATURE: 97.2 F | RESPIRATION RATE: 16 BRPM

## 2020-08-13 DIAGNOSIS — Z12.11 SPECIAL SCREENING FOR MALIGNANT NEOPLASMS, COLON: Primary | ICD-10-CM

## 2020-08-13 LAB — COLONOSCOPY: NORMAL

## 2020-08-13 PROCEDURE — G8918 PT W/O PREOP ORDER IV AB PRO: HCPCS

## 2020-08-13 PROCEDURE — G8907 PT DOC NO EVENTS ON DISCHARG: HCPCS

## 2020-08-13 PROCEDURE — 45380 COLONOSCOPY AND BIOPSY: CPT | Mod: XS

## 2020-08-13 PROCEDURE — 45385 COLONOSCOPY W/LESION REMOVAL: CPT | Mod: 33 | Performed by: INTERNAL MEDICINE

## 2020-08-13 PROCEDURE — 45385 COLONOSCOPY W/LESION REMOVAL: CPT

## 2020-08-13 PROCEDURE — 88305 TISSUE EXAM BY PATHOLOGIST: CPT | Performed by: INTERNAL MEDICINE

## 2020-08-13 RX ORDER — NALOXONE HYDROCHLORIDE 0.4 MG/ML
.1-.4 INJECTION, SOLUTION INTRAMUSCULAR; INTRAVENOUS; SUBCUTANEOUS
Status: DISCONTINUED | OUTPATIENT
Start: 2020-08-13 | End: 2020-08-14 | Stop reason: HOSPADM

## 2020-08-13 RX ORDER — FENTANYL CITRATE 50 UG/ML
INJECTION, SOLUTION INTRAMUSCULAR; INTRAVENOUS PRN
Status: DISCONTINUED | OUTPATIENT
Start: 2020-08-13 | End: 2020-08-13 | Stop reason: HOSPADM

## 2020-08-13 RX ORDER — FLUMAZENIL 0.1 MG/ML
0.2 INJECTION, SOLUTION INTRAVENOUS
Status: SHIPPED | OUTPATIENT
Start: 2020-08-13 | End: 2020-08-13

## 2020-08-13 RX ORDER — LIDOCAINE 40 MG/G
CREAM TOPICAL
Status: DISCONTINUED | OUTPATIENT
Start: 2020-08-13 | End: 2020-08-14 | Stop reason: HOSPADM

## 2020-08-13 RX ORDER — ONDANSETRON 2 MG/ML
4 INJECTION INTRAMUSCULAR; INTRAVENOUS EVERY 6 HOURS PRN
Status: DISCONTINUED | OUTPATIENT
Start: 2020-08-13 | End: 2020-08-14 | Stop reason: HOSPADM

## 2020-08-13 RX ORDER — ONDANSETRON 2 MG/ML
4 INJECTION INTRAMUSCULAR; INTRAVENOUS
Status: DISCONTINUED | OUTPATIENT
Start: 2020-08-13 | End: 2020-08-14 | Stop reason: HOSPADM

## 2020-08-13 RX ORDER — ONDANSETRON 4 MG/1
4 TABLET, ORALLY DISINTEGRATING ORAL EVERY 6 HOURS PRN
Status: DISCONTINUED | OUTPATIENT
Start: 2020-08-13 | End: 2020-08-14 | Stop reason: HOSPADM

## 2020-08-13 RX ADMIN — FENTANYL CITRATE 25 MCG: 50 INJECTION, SOLUTION INTRAMUSCULAR; INTRAVENOUS at 08:24

## 2020-08-13 RX ADMIN — FENTANYL CITRATE 50 MCG: 50 INJECTION, SOLUTION INTRAMUSCULAR; INTRAVENOUS at 08:20

## 2020-08-18 LAB — COPATH REPORT: NORMAL

## 2020-09-25 DIAGNOSIS — M1A.0710 IDIOPATHIC CHRONIC GOUT OF RIGHT FOOT WITHOUT TOPHUS: ICD-10-CM

## 2020-09-28 RX ORDER — INDOMETHACIN 25 MG/1
CAPSULE ORAL
Qty: 30 CAPSULE | Refills: 0 | Status: SHIPPED | OUTPATIENT
Start: 2020-09-28 | End: 2023-08-08

## 2020-09-28 NOTE — TELEPHONE ENCOUNTER
"Requested Prescriptions   Pending Prescriptions Disp Refills    indomethacin (INDOCIN) 25 MG capsule [Pharmacy Med Name: INDOMETHACIN 25MG CAPSULES] 30 capsule 0     Sig: TAKE 2 CAPSULES BY MOUTH TWICE DAILY WITH MEALS AS NEEDED       Gout Agents Protocol Failed - 9/25/2020  3:04 PM        Failed - Has Uric Acid on file in past 12 months and value is less than 6     Recent Labs   Lab Test 06/03/20  1002   URIC 9.9*     If level is 6mg/dL or greater, ok to refill one time and refer to provider.           Failed - Normal serum creatinine on file in the past 12 months     Recent Labs   Lab Test 06/03/20  1002   CR 0.57*       Ok to refill medication if creatinine is low          Passed - CBC on file in past 12 months     Recent Labs   Lab Test 06/03/20  1002   WBC 5.0   RBC 4.77   HGB 14.3   HCT 42.2                    Passed - ALT on file in past 12 months     Recent Labs   Lab Test 06/03/20  1002   ALT 27             Passed - Recent (12 mo) or future (30 days) visit within the authorizing provider's specialty     Patient has had an office visit with the authorizing provider or a provider within the authorizing providers department within the previous 12 mos or has a future within next 30 days. See \"Patient Info\" tab in inbasket, or \"Choose Columns\" in Meds & Orders section of the refill encounter.              Passed - Medication is active on med list        Passed - Patient is age 18 or older       NSAID Medications Failed - 9/25/2020  3:04 PM        Failed - Blood pressure under 140/90 in past 12 months     BP Readings from Last 3 Encounters:   08/13/20 (!) 147/95   06/03/20 134/86   10/10/19 109/74                 Failed - Normal serum creatinine on file in past 12 months     Recent Labs   Lab Test 06/03/20  1002   CR 0.57*       Ok to refill medication if creatinine is low          Passed - Normal ALT on file in past 12 months     Recent Labs   Lab Test 06/03/20  1002   ALT 27             Passed - Normal " "AST on file in past 12 months     Recent Labs   Lab Test 06/03/20  1002   AST 21             Passed - Recent (12 mo) or future (30 days) visit within the authorizing provider's specialty     Patient has had an office visit with the authorizing provider or a provider within the authorizing providers department within the previous 12 mos or has a future within next 30 days. See \"Patient Info\" tab in inbasket, or \"Choose Columns\" in Meds & Orders section of the refill encounter.              Passed - Patient is age 6-64 years        Passed - Normal CBC on file in past 12 months     Recent Labs   Lab Test 06/03/20  1002   WBC 5.0   RBC 4.77   HGB 14.3   HCT 42.2                    Passed - Medication is active on med list           Routing refill request to provider for review/approval because:  Failed protocol.  Yamile Perez RN,BSN  [facility]\    "

## 2020-12-13 ENCOUNTER — HEALTH MAINTENANCE LETTER (OUTPATIENT)
Age: 60
End: 2020-12-13

## 2021-01-29 DIAGNOSIS — I10 HYPERTENSION GOAL BP (BLOOD PRESSURE) < 140/90: ICD-10-CM

## 2021-02-01 RX ORDER — CARVEDILOL 25 MG/1
TABLET ORAL
Qty: 60 TABLET | Refills: 5 | Status: SHIPPED | OUTPATIENT
Start: 2021-02-01 | End: 2021-06-16

## 2021-03-08 DIAGNOSIS — I10 HYPERTENSION GOAL BP (BLOOD PRESSURE) < 140/90: ICD-10-CM

## 2021-03-10 NOTE — TELEPHONE ENCOUNTER
Routing refill request to provider for review/approval because:  BP Readings from Last 3 Encounters:   08/13/20 (!) 147/95   06/03/20 134/86   10/10/19 109/74     Was due in Dec for recheck.  Tiffanie Cavanaugh RN

## 2021-03-11 RX ORDER — LISINOPRIL AND HYDROCHLOROTHIAZIDE 12.5; 2 MG/1; MG/1
2 TABLET ORAL DAILY
Qty: 60 TABLET | Refills: 1 | Status: SHIPPED | OUTPATIENT
Start: 2021-03-11 | End: 2021-05-17

## 2021-04-02 DIAGNOSIS — I10 HYPERTENSION GOAL BP (BLOOD PRESSURE) < 140/90: ICD-10-CM

## 2021-04-05 RX ORDER — AMLODIPINE BESYLATE 5 MG/1
TABLET ORAL
Qty: 30 TABLET | Refills: 0 | Status: SHIPPED | OUTPATIENT
Start: 2021-04-05 | End: 2021-05-05

## 2021-04-05 NOTE — TELEPHONE ENCOUNTER
Routing refill request to provider for review/approval because:  BP Readings from Last 3 Encounters:   08/13/20 (!) 147/95   06/03/20 134/86   10/10/19 109/74

## 2021-04-17 ENCOUNTER — HEALTH MAINTENANCE LETTER (OUTPATIENT)
Age: 61
End: 2021-04-17

## 2021-05-04 DIAGNOSIS — I10 HYPERTENSION GOAL BP (BLOOD PRESSURE) < 140/90: ICD-10-CM

## 2021-05-05 RX ORDER — AMLODIPINE BESYLATE 5 MG/1
TABLET ORAL
Qty: 30 TABLET | Refills: 0 | Status: SHIPPED | OUTPATIENT
Start: 2021-05-05 | End: 2021-06-09

## 2021-05-05 NOTE — TELEPHONE ENCOUNTER
Routing refill request to provider for review/approval because:  Labs not current:  Cr  BP failing    Creatinine   Date Value Ref Range Status   06/03/2020 0.57 (L) 0.66 - 1.25 mg/dL Final     BP Readings from Last 3 Encounters:   08/13/20 (!) 147/95   06/03/20 134/86   10/10/19 109/74

## 2021-05-13 DIAGNOSIS — I10 HYPERTENSION GOAL BP (BLOOD PRESSURE) < 140/90: ICD-10-CM

## 2021-05-17 RX ORDER — LISINOPRIL AND HYDROCHLOROTHIAZIDE 12.5; 2 MG/1; MG/1
2 TABLET ORAL DAILY
Qty: 60 TABLET | Refills: 0 | Status: SHIPPED | OUTPATIENT
Start: 2021-05-17 | End: 2021-06-16

## 2021-05-17 NOTE — TELEPHONE ENCOUNTER
Routing refill request to provider for review/approval because:  Labs not current:  Creatinine and potassium  BP    Creatinine   Date Value Ref Range Status   06/03/2020 0.57 (L) 0.66 - 1.25 mg/dL Final     Potassium   Date Value Ref Range Status   06/03/2020 3.1 (L) 3.4 - 5.3 mmol/L Final       BP Readings from Last 3 Encounters:   08/13/20 (!) 147/95   06/03/20 134/86   10/10/19 109/74             Pending Prescriptions:                       Disp   Refills    lisinopril-hydrochlorothiazide (ZESTORETIC*60 tab*1        Sig: TAKE 2 TABLETS BY MOUTH DAILY        Aakash Boyd RN

## 2021-06-03 DIAGNOSIS — I10 HYPERTENSION GOAL BP (BLOOD PRESSURE) < 140/90: ICD-10-CM

## 2021-06-07 NOTE — TELEPHONE ENCOUNTER
Team, please call pt to schedule appt for BP follow up. Thanks.    Catherine Damon RN  [facility]

## 2021-06-07 NOTE — TELEPHONE ENCOUNTER
Called patient and left VM to call clinic. Please help schedule BP follow up appointment if patient returns call.  Catherine BECKHAM CMA ([facility])

## 2021-06-09 RX ORDER — AMLODIPINE BESYLATE 5 MG/1
5 TABLET ORAL DAILY
Qty: 30 TABLET | Refills: 0 | Status: SHIPPED | OUTPATIENT
Start: 2021-06-09 | End: 2021-06-16

## 2021-06-09 NOTE — TELEPHONE ENCOUNTER
Patient has appointment 6/16/2021 with provider.  Catherine BECKHAM CMA ([facility])

## 2021-06-09 NOTE — TELEPHONE ENCOUNTER
Called patient and left message to schedule an appointment for blood pressure recheck. Needs appointment for refills.  Loretta Cedeno CMA

## 2021-06-09 NOTE — TELEPHONE ENCOUNTER
BP Readings from Last 3 Encounters:   08/13/20 (!) 147/95   06/03/20 134/86   10/10/19 109/74     Last Written Prescription Date:  5/5/21  Last Fill Quantity: 30,  # refills: 0   Last office visit: 6/3/2020 with prescribing provider: Dr. Solares   Future Office Visit:   Next 5 appointments (look out 90 days)    Jun 16, 2021 10:00 AM  Office Visit with Amaury Solares MD  [facility] [address] [address]  [phone]         Routing refill request to provider for review/approval because:  Labs out of range:  BP not at goal or currently updated  Patient needs to be seen because it has been more than one year since last office visit    Med pended with reminder to keep appt. Please advise.    Nola Singleton, RN, BSN  [facility]

## 2021-06-14 DIAGNOSIS — I10 HYPERTENSION GOAL BP (BLOOD PRESSURE) < 140/90: ICD-10-CM

## 2021-06-15 NOTE — TELEPHONE ENCOUNTER
Routing refill request to provider for review/approval because:  Labs not current:  Creatinine, potassium, and sodium  BP    Creatinine   Date Value Ref Range Status   06/03/2020 0.57 (L) 0.66 - 1.25 mg/dL Final     Potassium   Date Value Ref Range Status   06/03/2020 3.1 (L) 3.4 - 5.3 mmol/L Final     Sodium   Date Value Ref Range Status   06/03/2020 141 133 - 144 mmol/L Final     BP Readings from Last 3 Encounters:   08/13/20 (!) 147/95   06/03/20 134/86   10/10/19 109/74             Pending Prescriptions:                       Disp   Refills    lisinopril-hydrochlorothiazide (ZESTORETIC*60 tab*0        Sig: TAKE 2 TABLETS BY MOUTH DAILY        Aakash Boyd RN

## 2021-06-16 ENCOUNTER — OFFICE VISIT (OUTPATIENT)
Dept: FAMILY MEDICINE | Facility: CLINIC | Age: 61
End: 2021-06-16
Payer: COMMERCIAL

## 2021-06-16 VITALS
OXYGEN SATURATION: 97 % | SYSTOLIC BLOOD PRESSURE: 159 MMHG | DIASTOLIC BLOOD PRESSURE: 98 MMHG | BODY MASS INDEX: 37.08 KG/M2 | WEIGHT: 279.8 LBS | TEMPERATURE: 97.7 F | HEART RATE: 66 BPM | HEIGHT: 73 IN

## 2021-06-16 DIAGNOSIS — R73.01 IMPAIRED FASTING GLUCOSE: ICD-10-CM

## 2021-06-16 DIAGNOSIS — Z87.39 HISTORY OF GOUT: ICD-10-CM

## 2021-06-16 DIAGNOSIS — Z86.0100 HISTORY OF COLONIC POLYPS: ICD-10-CM

## 2021-06-16 DIAGNOSIS — E66.01 MORBID OBESITY (H): ICD-10-CM

## 2021-06-16 DIAGNOSIS — E78.5 HYPERLIPIDEMIA LDL GOAL <100: ICD-10-CM

## 2021-06-16 DIAGNOSIS — I10 HYPERTENSION GOAL BP (BLOOD PRESSURE) < 140/90: Primary | ICD-10-CM

## 2021-06-16 DIAGNOSIS — Z11.4 SCREENING FOR HIV (HUMAN IMMUNODEFICIENCY VIRUS): ICD-10-CM

## 2021-06-16 LAB
ANION GAP SERPL CALCULATED.3IONS-SCNC: 4 MMOL/L (ref 3–14)
BUN SERPL-MCNC: 16 MG/DL (ref 7–30)
CALCIUM SERPL-MCNC: 9.1 MG/DL (ref 8.5–10.1)
CHLORIDE SERPL-SCNC: 103 MMOL/L (ref 94–109)
CHOLEST SERPL-MCNC: 218 MG/DL
CO2 SERPL-SCNC: 33 MMOL/L (ref 20–32)
CREAT SERPL-MCNC: 0.66 MG/DL (ref 0.66–1.25)
ERYTHROCYTE [DISTWIDTH] IN BLOOD BY AUTOMATED COUNT: 13.2 % (ref 10–15)
GFR SERPL CREATININE-BSD FRML MDRD: >90 ML/MIN/{1.73_M2}
GLUCOSE SERPL-MCNC: 115 MG/DL (ref 70–99)
HBA1C MFR BLD: 5.4 % (ref 0–5.6)
HCT VFR BLD AUTO: 41 % (ref 40–53)
HDLC SERPL-MCNC: 48 MG/DL
HGB BLD-MCNC: 13.9 G/DL (ref 13.3–17.7)
HIV 1+2 AB+HIV1 P24 AG SERPL QL IA: NONREACTIVE
LDLC SERPL CALC-MCNC: 125 MG/DL
MCH RBC QN AUTO: 31.3 PG (ref 26.5–33)
MCHC RBC AUTO-ENTMCNC: 33.9 G/DL (ref 31.5–36.5)
MCV RBC AUTO: 92 FL (ref 78–100)
NONHDLC SERPL-MCNC: 170 MG/DL
PLATELET # BLD AUTO: 189 10E9/L (ref 150–450)
POTASSIUM SERPL-SCNC: 3.5 MMOL/L (ref 3.4–5.3)
RBC # BLD AUTO: 4.44 10E12/L (ref 4.4–5.9)
SODIUM SERPL-SCNC: 140 MMOL/L (ref 133–144)
TRIGL SERPL-MCNC: 225 MG/DL
URATE SERPL-MCNC: 7.8 MG/DL (ref 3.5–7.2)
WBC # BLD AUTO: 5.2 10E9/L (ref 4–11)

## 2021-06-16 PROCEDURE — 80061 LIPID PANEL: CPT | Performed by: FAMILY MEDICINE

## 2021-06-16 PROCEDURE — 36415 COLL VENOUS BLD VENIPUNCTURE: CPT | Performed by: FAMILY MEDICINE

## 2021-06-16 PROCEDURE — 83036 HEMOGLOBIN GLYCOSYLATED A1C: CPT | Performed by: FAMILY MEDICINE

## 2021-06-16 PROCEDURE — 85027 COMPLETE CBC AUTOMATED: CPT | Performed by: FAMILY MEDICINE

## 2021-06-16 PROCEDURE — 84550 ASSAY OF BLOOD/URIC ACID: CPT | Performed by: FAMILY MEDICINE

## 2021-06-16 PROCEDURE — 87389 HIV-1 AG W/HIV-1&-2 AB AG IA: CPT | Performed by: FAMILY MEDICINE

## 2021-06-16 PROCEDURE — 99214 OFFICE O/P EST MOD 30 MIN: CPT | Performed by: FAMILY MEDICINE

## 2021-06-16 PROCEDURE — 80048 BASIC METABOLIC PNL TOTAL CA: CPT | Performed by: FAMILY MEDICINE

## 2021-06-16 RX ORDER — LISINOPRIL AND HYDROCHLOROTHIAZIDE 12.5; 2 MG/1; MG/1
2 TABLET ORAL DAILY
Qty: 60 TABLET | Refills: 0 | OUTPATIENT
Start: 2021-06-16

## 2021-06-16 RX ORDER — AMLODIPINE BESYLATE 5 MG/1
5 TABLET ORAL DAILY
Qty: 90 TABLET | Refills: 0 | Status: SHIPPED | OUTPATIENT
Start: 2021-06-16 | End: 2021-07-09

## 2021-06-16 RX ORDER — CARVEDILOL 25 MG/1
TABLET ORAL
Qty: 180 TABLET | Refills: 0 | Status: SHIPPED | OUTPATIENT
Start: 2021-06-16 | End: 2021-09-21

## 2021-06-16 RX ORDER — LISINOPRIL AND HYDROCHLOROTHIAZIDE 12.5; 2 MG/1; MG/1
2 TABLET ORAL DAILY
Qty: 180 TABLET | Refills: 0 | Status: SHIPPED | OUTPATIENT
Start: 2021-06-16 | End: 2021-09-21

## 2021-06-16 RX ORDER — SPIRONOLACTONE 50 MG/1
50 TABLET, FILM COATED ORAL 2 TIMES DAILY
Qty: 180 TABLET | Refills: 0 | Status: SHIPPED | OUTPATIENT
Start: 2021-06-16 | End: 2021-09-21

## 2021-06-16 ASSESSMENT — MIFFLIN-ST. JEOR: SCORE: 2145.42

## 2021-06-16 NOTE — RESULT ENCOUNTER NOTE
Andrew,  Your blood sugar is still running high, but not into a diabetes range.  Electrolytes and kidney tests look fine.  Blood counts are normal.  Uric acid is lower than last time, so that is good.  Your cholesterol values remain elevated and in a range that would benefit from cholesterol-lowering medicine.  I can talk to you more about that at next visit.  I think it would be helpful to start spironolactone 50 mg twice a day to help with your blood pressure control and to help keep your potassium up.  I therefore sent a prescription for that medication to your pharmacy.  Please add this medicine to your other pills that you have been taking and then return to see me in about 2 months for recheck on your blood pressure and potassium.    Amaury Solares MD

## 2021-06-16 NOTE — PROGRESS NOTES
"    Assessment & Plan     .    ICD-10-CM    1. Hypertension goal BP (blood pressure) < 140/90  I10 amLODIPine (NORVASC) 5 MG tablet     carvedilol (COREG) 25 MG tablet     lisinopril-hydrochlorothiazide (ZESTORETIC) 20-12.5 MG tablet     CBC with platelets   2. Morbid obesity (H)  E66.01    3. History of gout  Z87.39 Uric acid   4. Hyperlipidemia LDL goal <100  E78.5 Lipid panel reflex to direct LDL Fasting   5. Impaired fasting glucose  R73.01 BASIC METABOLIC PANEL     Hemoglobin A1c   6. History of colonic polyps  Z86.010    7. Screening for HIV (human immunodeficiency virus)  Z11.4 HIV Antigen Antibody Combo     His blood pressure is elevated today and he is overdue for lab work  We will check fasting labs today as above  He was low on potassium last year -- given his history of hypokalemia and the elevated blood pressure, I will likely add spironolactone to his current blood pressure regimen, but I will wait to do so until his lab results are available later today  I will inform him of his lab results when available and any medication adjustments then based on them and we will tentatively plan a follow-up visit in about 2 months      BMI:   Estimated body mass index is 36.45 kg/m  as calculated from the following:    Height as of this encounter: 1.866 m (6' 1.47\").    Weight as of this encounter: 126.9 kg (279 lb 12.8 oz).   Weight management plan: Discussed healthy diet and exercise guidelines      Return in about 2 months (around 8/16/2021) for BP Recheck.    Amaury Solares MD  [facility] MARY Neumann is a 60 year old who presents for the following health issues     HPI     Hypertension Follow-up      Do you check your blood pressure regularly outside of the clinic? No     Are you following a low salt diet? Yes    Are your blood pressures ever more than 140 on the top number (systolic) OR more   than 90 on the bottom number (diastolic), for example 140/90?       How many " servings of fruits and vegetables do you eat daily?  0-1    On average, how many sweetened beverages do you drink each day (Examples: soda, juice, sweet tea, etc.  Do NOT count diet or artificially sweetened beverages)?   1    How many days per week do you exercise enough to make your heart beat faster? 3 or less    How many minutes a day do you exercise enough to make your heart beat faster? 10 - 19    How many days per week do you miss taking your medication? 0    We have made some medication adjustments last year including decreasing his amlodipine dose from 10 mg daily to 5 mg daily because of lower extremity swelling.  He does get occasional mild lower extremity edema, but it has remained much improved and is basically a nonissue for him.  He remains on carvedilol and lisinopril HCTZ for blood pressure control as well.  He gets a lot of walking in every day at work.  He estimates he walks 4 to 5 miles per day doing his job.  He is trying to eat healthy, but has trouble losing weight.  He has not had any outside blood pressure readings checked in recent months.  He does not have a home blood pressure cuff.    He did get his colonoscopy done last year and had removal of some more polyps.  A repeat exam was recommended in 3 years.    Patient Active Problem List   Diagnosis     Hypertension goal BP (blood pressure) < 140/90     Hyperlipidemia LDL goal <100     Advanced directives, counseling/discussion     History of gout     Impaired fasting glucose     Morbid obesity (H)     History of colonic polyps     History of total knee arthroplasty, bilateral     Current Outpatient Medications   Medication     amLODIPine (NORVASC) 5 MG tablet     carvedilol (COREG) 25 MG tablet     indomethacin (INDOCIN) 25 MG capsule     lisinopril-hydrochlorothiazide (ZESTORETIC) 20-12.5 MG tablet     Acetaminophen (TYLENOL 8 HOUR PO)     aspirin (ASA) 325 MG tablet     No current facility-administered medications for this visit.   "        Review of Systems   Constitutional, HEENT, cardiovascular, pulmonary, gi and gu systems are negative, except as otherwise noted.      Objective    BP (!) 159/98 (BP Location: Right arm, Patient Position: Sitting, Cuff Size: Adult Large)   Pulse 66   Temp 97.7  F (36.5  C) (Oral)   Ht 1.866 m (6' 1.47\")   Wt 126.9 kg (279 lb 12.8 oz)   SpO2 97%   BMI 36.45 kg/m    Body mass index is 36.45 kg/m .  Physical Exam   GENERAL: alert, no distress and obese  MS: He has well-healed surgical scars over both knees.  He has trace lower extremity edema.    Previous lab results were reviewed.        "

## 2021-07-08 DIAGNOSIS — I10 HYPERTENSION GOAL BP (BLOOD PRESSURE) < 140/90: ICD-10-CM

## 2021-07-09 RX ORDER — AMLODIPINE BESYLATE 5 MG/1
TABLET ORAL
Qty: 30 TABLET | Refills: 1 | Status: SHIPPED | OUTPATIENT
Start: 2021-07-09 | End: 2021-09-29

## 2021-07-09 NOTE — TELEPHONE ENCOUNTER
"Routing refill request to provider for review/approval because:  Blood Pressure out of range      Requested Prescriptions   Pending Prescriptions Disp Refills     amLODIPine (NORVASC) 5 MG tablet [Pharmacy Med Name: AMLODIPINE BESYLATE 5MG TABLETS] 30 tablet      Sig: TAKE 1 TABLET BY MOUTH DAILY       Calcium Channel Blockers Protocol  Failed - 7/8/2021  3:54 AM        Failed - Blood pressure under 140/90 in past 12 months     BP Readings from Last 3 Encounters:   06/16/21 (!) 159/98   08/13/20 (!) 147/95   06/03/20 134/86                 Passed - Recent (12 mo) or future (30 days) visit within the authorizing provider's specialty     Patient has had an office visit with the authorizing provider or a provider within the authorizing providers department within the previous 12 mos or has a future within next 30 days. See \"Patient Info\" tab in inbasket, or \"Choose Columns\" in Meds & Orders section of the refill encounter.              Passed - Medication is active on med list        Passed - Patient is age 18 or older        Passed - Normal serum creatinine on file in past 12 months     Recent Labs   Lab Test 06/16/21  1035   CR 0.66       Ok to refill medication if creatinine is low             Zahira Calvert RN on 7/9/2021 at 4:06 PM    "

## 2021-09-15 DIAGNOSIS — I10 HYPERTENSION GOAL BP (BLOOD PRESSURE) < 140/90: ICD-10-CM

## 2021-09-17 NOTE — TELEPHONE ENCOUNTER
Pt was due in August for recheck B/P and labs after starting spironolactone 50 mg.  Please schedule appt with PCP.  Tiffanie Cavanaugh RN

## 2021-09-17 NOTE — TELEPHONE ENCOUNTER
Called and left patient voicemail regarding message below.  If he calls back please assist in scheduling.  Jennifer Grissom MA

## 2021-09-21 ENCOUNTER — ALLIED HEALTH/NURSE VISIT (OUTPATIENT)
Dept: FAMILY MEDICINE | Facility: CLINIC | Age: 61
End: 2021-09-21
Payer: COMMERCIAL

## 2021-09-21 VITALS — SYSTOLIC BLOOD PRESSURE: 108 MMHG | DIASTOLIC BLOOD PRESSURE: 60 MMHG

## 2021-09-21 DIAGNOSIS — I10 HYPERTENSION GOAL BP (BLOOD PRESSURE) < 140/90: ICD-10-CM

## 2021-09-21 PROCEDURE — 99207 PR NO CHARGE NURSE ONLY: CPT

## 2021-09-21 RX ORDER — CARVEDILOL 25 MG/1
TABLET ORAL
Qty: 60 TABLET | Refills: 0 | Status: SHIPPED | OUTPATIENT
Start: 2021-09-21 | End: 2021-09-29

## 2021-09-21 RX ORDER — CARVEDILOL 25 MG/1
TABLET ORAL
Qty: 60 TABLET | Refills: 0 | Status: CANCELLED | OUTPATIENT
Start: 2021-09-21

## 2021-09-21 RX ORDER — LISINOPRIL AND HYDROCHLOROTHIAZIDE 12.5; 2 MG/1; MG/1
2 TABLET ORAL DAILY
Qty: 60 TABLET | Refills: 0 | Status: CANCELLED | OUTPATIENT
Start: 2021-09-21

## 2021-09-21 RX ORDER — SPIRONOLACTONE 50 MG/1
TABLET, FILM COATED ORAL
Qty: 60 TABLET | Refills: 0 | Status: SHIPPED | OUTPATIENT
Start: 2021-09-21 | End: 2021-09-29

## 2021-09-21 RX ORDER — SPIRONOLACTONE 50 MG/1
TABLET, FILM COATED ORAL
Qty: 60 TABLET | Refills: 0 | Status: CANCELLED | OUTPATIENT
Start: 2021-09-21

## 2021-09-21 RX ORDER — LISINOPRIL AND HYDROCHLOROTHIAZIDE 12.5; 2 MG/1; MG/1
2 TABLET ORAL DAILY
Qty: 60 TABLET | Refills: 0 | Status: SHIPPED | OUTPATIENT
Start: 2021-09-21 | End: 2021-10-22

## 2021-09-21 RX ORDER — AMLODIPINE BESYLATE 5 MG/1
5 TABLET ORAL DAILY
Qty: 30 TABLET | Refills: 1 | Status: CANCELLED | OUTPATIENT
Start: 2021-09-21

## 2021-09-21 NOTE — TELEPHONE ENCOUNTER
Patient is scheduled for a nurse only BP recheck on 9/21/21. Do you want him to schedule an appointment with you?  Loretta Cedeno CMA

## 2021-09-26 ENCOUNTER — HEALTH MAINTENANCE LETTER (OUTPATIENT)
Age: 61
End: 2021-09-26

## 2021-09-29 ENCOUNTER — OFFICE VISIT (OUTPATIENT)
Dept: FAMILY MEDICINE | Facility: CLINIC | Age: 61
End: 2021-09-29
Payer: COMMERCIAL

## 2021-09-29 VITALS
SYSTOLIC BLOOD PRESSURE: 108 MMHG | HEART RATE: 70 BPM | BODY MASS INDEX: 33.87 KG/M2 | DIASTOLIC BLOOD PRESSURE: 69 MMHG | OXYGEN SATURATION: 98 % | TEMPERATURE: 97.8 F | WEIGHT: 260 LBS

## 2021-09-29 DIAGNOSIS — R73.01 IMPAIRED FASTING GLUCOSE: ICD-10-CM

## 2021-09-29 DIAGNOSIS — E78.5 HYPERLIPIDEMIA LDL GOAL <100: ICD-10-CM

## 2021-09-29 DIAGNOSIS — E66.811 OBESITY (BMI 30.0-34.9): ICD-10-CM

## 2021-09-29 DIAGNOSIS — I10 HYPERTENSION GOAL BP (BLOOD PRESSURE) < 140/90: Primary | ICD-10-CM

## 2021-09-29 PROBLEM — E66.01 MORBID OBESITY (H): Status: RESOLVED | Noted: 2020-06-03 | Resolved: 2021-09-29

## 2021-09-29 LAB
ALBUMIN SERPL-MCNC: 4 G/DL (ref 3.4–5)
ALP SERPL-CCNC: 42 U/L (ref 40–150)
ALT SERPL W P-5'-P-CCNC: 27 U/L (ref 0–70)
ANION GAP SERPL CALCULATED.3IONS-SCNC: 2 MMOL/L (ref 3–14)
AST SERPL W P-5'-P-CCNC: 14 U/L (ref 0–45)
BILIRUB SERPL-MCNC: 0.6 MG/DL (ref 0.2–1.3)
BUN SERPL-MCNC: 29 MG/DL (ref 7–30)
CALCIUM SERPL-MCNC: 9.5 MG/DL (ref 8.5–10.1)
CHLORIDE BLD-SCNC: 107 MMOL/L (ref 94–109)
CO2 SERPL-SCNC: 28 MMOL/L (ref 20–32)
CREAT SERPL-MCNC: 1.32 MG/DL (ref 0.66–1.25)
GFR SERPL CREATININE-BSD FRML MDRD: 58 ML/MIN/1.73M2
GLUCOSE BLD-MCNC: 115 MG/DL (ref 70–99)
POTASSIUM BLD-SCNC: 4.8 MMOL/L (ref 3.4–5.3)
PROT SERPL-MCNC: 7.8 G/DL (ref 6.8–8.8)
SODIUM SERPL-SCNC: 137 MMOL/L (ref 133–144)

## 2021-09-29 PROCEDURE — 80053 COMPREHEN METABOLIC PANEL: CPT | Performed by: FAMILY MEDICINE

## 2021-09-29 PROCEDURE — 36415 COLL VENOUS BLD VENIPUNCTURE: CPT | Performed by: FAMILY MEDICINE

## 2021-09-29 PROCEDURE — 99214 OFFICE O/P EST MOD 30 MIN: CPT | Performed by: FAMILY MEDICINE

## 2021-09-29 RX ORDER — ROSUVASTATIN CALCIUM 10 MG/1
10 TABLET, COATED ORAL DAILY
Qty: 90 TABLET | Refills: 2 | Status: SHIPPED | OUTPATIENT
Start: 2021-09-29 | End: 2022-03-30

## 2021-09-29 RX ORDER — CARVEDILOL 25 MG/1
TABLET ORAL
Qty: 180 TABLET | Refills: 1 | Status: SHIPPED | OUTPATIENT
Start: 2021-09-29 | End: 2021-10-22

## 2021-09-29 RX ORDER — AMLODIPINE BESYLATE 5 MG/1
5 TABLET ORAL DAILY
Qty: 90 TABLET | Refills: 1 | Status: SHIPPED | OUTPATIENT
Start: 2021-09-29 | End: 2022-03-25

## 2021-09-29 RX ORDER — SPIRONOLACTONE 50 MG/1
TABLET, FILM COATED ORAL
Qty: 180 TABLET | Refills: 1 | Status: SHIPPED | OUTPATIENT
Start: 2021-09-29 | End: 2021-10-22

## 2021-09-29 NOTE — RESULT ENCOUNTER NOTE
Andrew,  Your potassium and other electrolytes look nice and healthy now.  Your blood sugar is stable.  Your creatinine kidney test was elevated this time, so we will keep an eye on it.Please continue with your spironolactone, carvedilol, and amlodipine as you have been doing.  You can stay off the lisinopril HCTZ.  Please start the new rosuvastatin to help with your cholesterol as well.I would advise a recheck in about 6 months with a general physical and some repeat fasting lab work.    Amaury Solares MD

## 2021-09-29 NOTE — Clinical Note
It looks like someone ordered an HIV test on his blood that was drawn today.  He already had that done in June.  Please cancel the HIV test ordered for today.

## 2021-09-29 NOTE — PROGRESS NOTES
Assessment & Plan     .    ICD-10-CM    1. Hypertension goal BP (blood pressure) < 140/90  I10 amLODIPine (NORVASC) 5 MG tablet     carvedilol (COREG) 25 MG tablet     spironolactone (ALDACTONE) 50 MG tablet     Comprehensive metabolic panel (BMP + Alb, Alk Phos, ALT, AST, Total. Bili, TP)     Comprehensive metabolic panel (BMP + Alb, Alk Phos, ALT, AST, Total. Bili, TP)   2. Hyperlipidemia LDL goal <100  E78.5 rosuvastatin (CRESTOR) 10 MG tablet   3. Impaired fasting glucose  R73.01    4. Obesity (BMI 30.0-34.9)  E66.9      His blood pressure seems adequately controlled today without the lisinopril HCTZ, so we will plan to continue without that medication  He was advised to continue the spironolactone, amlodipine, and carvedilol  We will check a fasting BMP today with special attention to his electrolytes and kidney tests, as well as blood sugar    I also reviewed his lipid panel results and the rationale for using a statin and he was open to that, so we will start him on rosuvastatin 10 mg daily  He was encouraged on continued diet and exercise for ongoing weight loss    Return in about 6 months (around 3/29/2022) for Physical Exam, Lab Work, Routine Visit.    Amaury Solares MD  [facility] MARY Neumann is a 61 year old who presents for the following health issues     HPI     Hypertension Follow-up      Do you check your blood pressure regularly outside of the clinic? No     Are you following a low salt diet? Yes    Are your blood pressures ever more than 140 on the top number (systolic) OR more   than 90 on the bottom number (diastolic), for example 140/90?       How many servings of fruits and vegetables do you eat daily?  0-1    On average, how many sweetened beverages do you drink each day (Examples: soda, juice, sweet tea, etc.  Do NOT count diet or artificially sweetened beverages)?   0-1    How many days per week do you exercise enough to make your heart beat faster?  Walks a lot at work. Walks about 6 miles a day at work    How many minutes a day do you exercise enough to make your heart beat faster?     How many days per week do you miss taking your medication? 0, Discuss lisinopril-hydrochlorothiazide. Sweating.    I last saw him a few months ago.  His blood pressure was running high, so he added spironolactone 50 mg twice a day to his amlodipine, carvedilol, and lisinopril HCTZ.  At times in recent weeks he has had a lot of sweating and he thought that may be related to the lisinopril HCTZ, so he held the lisinopril HCTZ and his sweating got better.  He remains on the other 3 blood pressure meds.  He does have a history of hyperlipidemia and impaired fasting glucose and obesity as well.  He has been trying to exercise a bit more than he used to and he has been losing some weight.    Patient Active Problem List   Diagnosis     Hypertension goal BP (blood pressure) < 140/90     Hyperlipidemia LDL goal <100     Advanced directives, counseling/discussion     History of gout     Impaired fasting glucose     Morbid obesity (H)     History of colonic polyps     History of total knee arthroplasty, bilateral     Current Outpatient Medications   Medication     Acetaminophen (TYLENOL 8 HOUR PO)     amLODIPine (NORVASC) 5 MG tablet     carvedilol (COREG) 25 MG tablet     spironolactone (ALDACTONE) 50 MG tablet     aspirin (ASA) 325 MG tablet     indomethacin (INDOCIN) 25 MG capsule     lisinopril-hydrochlorothiazide (ZESTORETIC) 20-12.5 MG tablet     No current facility-administered medications for this visit.         Review of Systems   Constitutional, HEENT, cardiovascular, pulmonary, gi and gu systems are negative, except as otherwise noted.      Objective    /69 (BP Location: Left arm, Patient Position: Sitting, Cuff Size: Adult Large)   Pulse 70   Temp 97.8  F (36.6  C) (Oral)   Wt 117.9 kg (260 lb)   SpO2 98%   BMI 33.87 kg/m    Body mass index is 33.87 kg/m .  Physical  Exam   GENERAL: alert, no distress and over weight    Office Visit on 06/16/2021   Component Date Value Ref Range Status     HIV Antigen Antibody Combo 06/16/2021 Nonreactive  NR^Nonreactive     Final    HIV-1 p24 Ag & HIV-1/HIV-2 Ab Not Detected     Sodium 06/16/2021 140  133 - 144 mmol/L Final     Potassium 06/16/2021 3.5  3.4 - 5.3 mmol/L Final     Chloride 06/16/2021 103  94 - 109 mmol/L Final     Carbon Dioxide 06/16/2021 33* 20 - 32 mmol/L Final     Anion Gap 06/16/2021 4  3 - 14 mmol/L Final     Glucose 06/16/2021 115* 70 - 99 mg/dL Final    Fasting specimen     Urea Nitrogen 06/16/2021 16  7 - 30 mg/dL Final     Creatinine 06/16/2021 0.66  0.66 - 1.25 mg/dL Final     GFR Estimate 06/16/2021 >90  >60 mL/min/[1.73_m2] Final    Comment: Non  GFR Calc  Starting 12/18/2018, serum creatinine based estimated GFR (eGFR) will be   calculated using the Chronic Kidney Disease Epidemiology Collaboration   (CKD-EPI) equation.       GFR Estimate If Black 06/16/2021 >90  >60 mL/min/[1.73_m2] Final    Comment:  GFR Calc  Starting 12/18/2018, serum creatinine based estimated GFR (eGFR) will be   calculated using the Chronic Kidney Disease Epidemiology Collaboration   (CKD-EPI) equation.       Calcium 06/16/2021 9.1  8.5 - 10.1 mg/dL Final     Hemoglobin A1C 06/16/2021 5.4  0 - 5.6 % Final    Comment: Normal <5.7% Prediabetes 5.7-6.4%  Diabetes 6.5% or higher - adopted from ADA   consensus guidelines.       Uric Acid 06/16/2021 7.8* 3.5 - 7.2 mg/dL Final     Cholesterol 06/16/2021 218* <200 mg/dL Final    Desirable:       <200 mg/dl     Triglycerides 06/16/2021 225* <150 mg/dL Final    Comment: Borderline high:  150-199 mg/dl  High:             200-499 mg/dl  Very high:       >499 mg/dl  Fasting specimen       HDL Cholesterol 06/16/2021 48  >39 mg/dL Final     LDL Cholesterol Calculated 06/16/2021 125* <100 mg/dL Final    Comment: Above desirable:  100-129 mg/dl  Borderline High:  130-159  mg/dL  High:             160-189 mg/dL  Very high:       >189 mg/dl       Non HDL Cholesterol 06/16/2021 170* <130 mg/dL Final    Comment: Above Desirable:  130-159 mg/dl  Borderline high:  160-189 mg/dl  High:             190-219 mg/dl  Very high:       >219 mg/dl       WBC 06/16/2021 5.2  4.0 - 11.0 10e9/L Final     RBC Count 06/16/2021 4.44  4.4 - 5.9 10e12/L Final     Hemoglobin 06/16/2021 13.9  13.3 - 17.7 g/dL Final     Hematocrit 06/16/2021 41.0  40.0 - 53.0 % Final     MCV 06/16/2021 92  78 - 100 fl Final     MCH 06/16/2021 31.3  26.5 - 33.0 pg Final     MCHC 06/16/2021 33.9  31.5 - 36.5 g/dL Final     RDW 06/16/2021 13.2  10.0 - 15.0 % Final     Platelet Count 06/16/2021 189  150 - 450 10e9/L Final     The 10-year ASCVD risk score (Alia QURESHI Jr., et al., 2013) is: 8.4%    Values used to calculate the score:      Age: 61 years      Sex: Male      Is Non- : No      Diabetic: No      Tobacco smoker: No      Systolic Blood Pressure: 108 mmHg      Is BP treated: Yes      HDL Cholesterol: 48 mg/dL      Total Cholesterol: 218 mg/dL

## 2021-10-19 DIAGNOSIS — I10 HYPERTENSION GOAL BP (BLOOD PRESSURE) < 140/90: ICD-10-CM

## 2021-10-22 RX ORDER — LISINOPRIL AND HYDROCHLOROTHIAZIDE 12.5; 2 MG/1; MG/1
2 TABLET ORAL DAILY
Qty: 60 TABLET | Refills: 0 | OUTPATIENT
Start: 2021-10-22

## 2021-10-22 RX ORDER — SPIRONOLACTONE 50 MG/1
TABLET, FILM COATED ORAL
Qty: 180 TABLET | Refills: 1 | Status: SHIPPED | OUTPATIENT
Start: 2021-10-22 | End: 2022-03-30

## 2021-10-22 RX ORDER — CARVEDILOL 25 MG/1
TABLET ORAL
Qty: 180 TABLET | Refills: 1 | Status: SHIPPED | OUTPATIENT
Start: 2021-10-22 | End: 2022-03-30

## 2021-10-22 NOTE — TELEPHONE ENCOUNTER
Per result note below lisinopril/HCTZ should be removed from med list, prescription denied to pharmacy and removed from med list.   Prescriptions for Coreg and Spironolactone approved per [facility] Refill Protocol.      Result note:  Your potassium and other electrolytes look nice and healthy now.  Your blood sugar is stable.  Your creatinine kidney test was elevated this time, so we will keep an eye on it.  Please continue with your spironolactone, carvedilol, and amlodipine as you have been doing.  You can stay off the lisinopril HCTZ.  Please start the new rosuvastatin to help with your cholesterol as well.  I would advise a recheck in about 6 months with a general physical and some repeat fasting lab work.     Amaury Roy RN

## 2022-01-20 NOTE — TELEPHONE ENCOUNTER
Head,  normocephalic,  atraumatic,  Face,  Face within normal limits,  Ears,  External ears within normal limits,  Nose/Nasopharynx,  External nose  normal appearance,  nares patent,  no nasal discharge Routing refill request to provider for review/approval because:  Failed protocol.  Yamile Perez RN,BSN  [facility]

## 2022-03-24 DIAGNOSIS — I10 HYPERTENSION GOAL BP (BLOOD PRESSURE) < 140/90: ICD-10-CM

## 2022-03-25 RX ORDER — AMLODIPINE BESYLATE 5 MG/1
TABLET ORAL
Qty: 90 TABLET | Refills: 0 | Status: SHIPPED | OUTPATIENT
Start: 2022-03-25 | End: 2022-03-30

## 2022-03-25 NOTE — TELEPHONE ENCOUNTER
"Routing refill request to provider for review/approval because:  Labs out of range:  creatinine     Requested Prescriptions   Pending Prescriptions Disp Refills    amLODIPine (NORVASC) 5 MG tablet [Pharmacy Med Name: AMLODIPINE BESYLATE 5MG TABLETS] 90 tablet 1     Sig: TAKE 1 TABLET(5 MG) BY MOUTH DAILY        Calcium Channel Blockers Protocol  Failed - 3/24/2022  3:46 AM        Failed - Normal serum creatinine on file in past 12 months     Recent Labs   Lab Test 09/29/21  1145   CR 1.32*       Ok to refill medication if creatinine is low          Passed - Blood pressure under 140/90 in past 12 months       BP Readings from Last 3 Encounters:   09/29/21 108/69   09/21/21 108/60   06/16/21 (!) 159/98                 Passed - Recent (12 mo) or future (30 days) visit within the authorizing provider's specialty     Patient has had an office visit with the authorizing provider or a provider within the authorizing providers department within the previous 12 mos or has a future within next 30 days. See \"Patient Info\" tab in inbasket, or \"Choose Columns\" in Meds & Orders section of the refill encounter.              Passed - Medication is active on med list        Passed - Patient is age 18 or older              Cami Castro RN   [facility]     "

## 2022-03-30 ENCOUNTER — OFFICE VISIT (OUTPATIENT)
Dept: FAMILY MEDICINE | Facility: CLINIC | Age: 62
End: 2022-03-30
Payer: COMMERCIAL

## 2022-03-30 VITALS
DIASTOLIC BLOOD PRESSURE: 70 MMHG | BODY MASS INDEX: 36.84 KG/M2 | OXYGEN SATURATION: 97 % | WEIGHT: 278 LBS | SYSTOLIC BLOOD PRESSURE: 112 MMHG | HEIGHT: 73 IN | HEART RATE: 69 BPM | TEMPERATURE: 98.4 F

## 2022-03-30 DIAGNOSIS — Z00.00 ROUTINE GENERAL MEDICAL EXAMINATION AT A HEALTH CARE FACILITY: Primary | ICD-10-CM

## 2022-03-30 DIAGNOSIS — Z87.39 HISTORY OF GOUT: ICD-10-CM

## 2022-03-30 DIAGNOSIS — E78.5 HYPERLIPIDEMIA LDL GOAL <100: ICD-10-CM

## 2022-03-30 DIAGNOSIS — Z86.0100 HISTORY OF COLONIC POLYPS: ICD-10-CM

## 2022-03-30 DIAGNOSIS — R73.01 IMPAIRED FASTING GLUCOSE: ICD-10-CM

## 2022-03-30 DIAGNOSIS — E66.01 MORBID OBESITY (H): ICD-10-CM

## 2022-03-30 DIAGNOSIS — I10 HYPERTENSION GOAL BP (BLOOD PRESSURE) < 140/90: ICD-10-CM

## 2022-03-30 DIAGNOSIS — Z96.653 HISTORY OF TOTAL KNEE ARTHROPLASTY, BILATERAL: ICD-10-CM

## 2022-03-30 PROBLEM — E66.811 OBESITY (BMI 30.0-34.9): Status: RESOLVED | Noted: 2020-05-20 | Resolved: 2022-03-30

## 2022-03-30 LAB
ALT SERPL W P-5'-P-CCNC: 42 U/L (ref 0–70)
ANION GAP SERPL CALCULATED.3IONS-SCNC: 8 MMOL/L (ref 3–14)
BUN SERPL-MCNC: 23 MG/DL (ref 7–30)
CALCIUM SERPL-MCNC: 9.4 MG/DL (ref 8.5–10.1)
CHLORIDE BLD-SCNC: 108 MMOL/L (ref 94–109)
CHOLEST SERPL-MCNC: 191 MG/DL
CO2 SERPL-SCNC: 23 MMOL/L (ref 20–32)
CREAT SERPL-MCNC: 1.05 MG/DL (ref 0.66–1.25)
ERYTHROCYTE [DISTWIDTH] IN BLOOD BY AUTOMATED COUNT: 13.5 % (ref 10–15)
FASTING STATUS PATIENT QL REPORTED: YES
GFR SERPL CREATININE-BSD FRML MDRD: 81 ML/MIN/1.73M2
GLUCOSE BLD-MCNC: 144 MG/DL (ref 70–99)
HBA1C MFR BLD: 5.4 % (ref 0–5.6)
HCT VFR BLD AUTO: 38 % (ref 40–53)
HDLC SERPL-MCNC: 60 MG/DL
HGB BLD-MCNC: 12.7 G/DL (ref 13.3–17.7)
LDLC SERPL CALC-MCNC: 91 MG/DL
MCH RBC QN AUTO: 31.5 PG (ref 26.5–33)
MCHC RBC AUTO-ENTMCNC: 33.4 G/DL (ref 31.5–36.5)
MCV RBC AUTO: 94 FL (ref 78–100)
NONHDLC SERPL-MCNC: 131 MG/DL
PLATELET # BLD AUTO: 212 10E3/UL (ref 150–450)
POTASSIUM BLD-SCNC: 4.6 MMOL/L (ref 3.4–5.3)
PSA SERPL-MCNC: 0.81 UG/L (ref 0–4)
RBC # BLD AUTO: 4.03 10E6/UL (ref 4.4–5.9)
SODIUM SERPL-SCNC: 139 MMOL/L (ref 133–144)
TRIGL SERPL-MCNC: 198 MG/DL
URATE SERPL-MCNC: 6.9 MG/DL (ref 3.5–7.2)
WBC # BLD AUTO: 8.3 10E3/UL (ref 4–11)

## 2022-03-30 PROCEDURE — 84550 ASSAY OF BLOOD/URIC ACID: CPT | Performed by: FAMILY MEDICINE

## 2022-03-30 PROCEDURE — 80061 LIPID PANEL: CPT | Performed by: FAMILY MEDICINE

## 2022-03-30 PROCEDURE — 84460 ALANINE AMINO (ALT) (SGPT): CPT | Performed by: FAMILY MEDICINE

## 2022-03-30 PROCEDURE — 90471 IMMUNIZATION ADMIN: CPT | Performed by: FAMILY MEDICINE

## 2022-03-30 PROCEDURE — 36415 COLL VENOUS BLD VENIPUNCTURE: CPT | Performed by: FAMILY MEDICINE

## 2022-03-30 PROCEDURE — 83036 HEMOGLOBIN GLYCOSYLATED A1C: CPT | Performed by: FAMILY MEDICINE

## 2022-03-30 PROCEDURE — 85027 COMPLETE CBC AUTOMATED: CPT | Performed by: FAMILY MEDICINE

## 2022-03-30 PROCEDURE — 90750 HZV VACC RECOMBINANT IM: CPT | Performed by: FAMILY MEDICINE

## 2022-03-30 PROCEDURE — 90472 IMMUNIZATION ADMIN EACH ADD: CPT | Performed by: FAMILY MEDICINE

## 2022-03-30 PROCEDURE — 80048 BASIC METABOLIC PNL TOTAL CA: CPT | Performed by: FAMILY MEDICINE

## 2022-03-30 PROCEDURE — 90715 TDAP VACCINE 7 YRS/> IM: CPT | Performed by: FAMILY MEDICINE

## 2022-03-30 PROCEDURE — 99396 PREV VISIT EST AGE 40-64: CPT | Mod: 25 | Performed by: FAMILY MEDICINE

## 2022-03-30 PROCEDURE — 99213 OFFICE O/P EST LOW 20 MIN: CPT | Mod: 25 | Performed by: FAMILY MEDICINE

## 2022-03-30 PROCEDURE — G0103 PSA SCREENING: HCPCS | Performed by: FAMILY MEDICINE

## 2022-03-30 RX ORDER — AMLODIPINE BESYLATE 5 MG/1
TABLET ORAL
Qty: 90 TABLET | Refills: 3 | Status: SHIPPED | OUTPATIENT
Start: 2022-03-30 | End: 2023-04-05

## 2022-03-30 RX ORDER — ROSUVASTATIN CALCIUM 10 MG/1
10 TABLET, COATED ORAL DAILY
Qty: 90 TABLET | Refills: 3 | Status: SHIPPED | OUTPATIENT
Start: 2022-03-30 | End: 2023-04-18

## 2022-03-30 RX ORDER — SPIRONOLACTONE 50 MG/1
TABLET, FILM COATED ORAL
Qty: 180 TABLET | Refills: 3 | Status: SHIPPED | OUTPATIENT
Start: 2022-03-30 | End: 2023-04-24

## 2022-03-30 RX ORDER — CARVEDILOL 25 MG/1
TABLET ORAL
Qty: 180 TABLET | Refills: 3 | Status: SHIPPED | OUTPATIENT
Start: 2022-03-30 | End: 2023-05-08

## 2022-03-30 ASSESSMENT — ENCOUNTER SYMPTOMS
CONSTIPATION: 0
MYALGIAS: 0
HEMATOCHEZIA: 0
HEARTBURN: 0
ABDOMINAL PAIN: 0
DIARRHEA: 0
SORE THROAT: 0
DYSURIA: 0
DIZZINESS: 0
SHORTNESS OF BREATH: 0
WEAKNESS: 0
PARESTHESIAS: 0
COUGH: 0
CHILLS: 0
PALPITATIONS: 0
ARTHRALGIAS: 1
FREQUENCY: 0
EYE PAIN: 0
JOINT SWELLING: 0
HEADACHES: 0
NERVOUS/ANXIOUS: 0
HEMATURIA: 0
FEVER: 0

## 2022-03-30 NOTE — PROGRESS NOTES
SUBJECTIVE:   CC: Thien Zarate is an 61 year old male who presents for a preventative health visit and follow-up on baseline health conditions.       Patient has been advised of split billing requirements and indicates understanding: Yes  Healthy Habits:     Getting at least 3 servings of Calcium per day:  Yes    Bi-annual eye exam:  Yes    Dental care twice a year:  NO    Sleep apnea or symptoms of sleep apnea:  None    Diet:  Regular (no restrictions)    Frequency of exercise:  2-3 days/week    Duration of exercise:  45-60 minutes    Taking medications regularly:  Yes    Medication side effects:  Not applicable    PHQ-2 Total Score: 0    Additional concerns today:  No    He has been on rosuvastatin for hyperlipidemia since his last visit and did come in fasting today so we could check that.  He is also on spironolactone for blood pressure control in addition to his other medications below.  He seems to be tolerating these medicines well.    Today's PHQ-2 Score:   PHQ-2 (  [employer]) 3/30/2022   Q1: Little interest or pleasure in doing things 0   Q2: Feeling down, depressed or hopeless 0   PHQ-2 Score 0   PHQ-2 Total Score (12-17 Years)- Positive if 3 or more points; Administer PHQ-A if positive -   Q1: Little interest or pleasure in doing things Not at all   Q2: Feeling down, depressed or hopeless Not at all   PHQ-2 Score 0       Abuse: Current or Past(Physical, Sexual or Emotional)- No  Do you feel safe in your environment? Yes        Social History     Tobacco Use     Smoking status: Former Smoker     Years: 30.00     Types: Cigars     Quit date: 6/3/1979     Years since quittin.8     Smokeless tobacco: Never Used   Substance Use Topics     Alcohol use: Yes     If you drink alcohol do you typically have >3 drinks per day or >7 drinks per week? Yes      Alcohol Use 3/30/2022   Prescreen: >3 drinks/day or >7 drinks/week? Yes   Prescreen: >3 drinks/day or >7 drinks/week? -   AUDIT SCORE  5     AUDIT -  Alcohol Use Disorders Identification Test - Reproduced from the World Health Organization Audit 2001 (Second Edition) 3/30/2022   1.  How often do you have a drink containing alcohol? 2 to 3 times a week   2.  How many drinks containing alcohol do you have on a typical day when you are drinking? 3 or 4   3.  How often do you have five or more drinks on one occasion? Less than monthly   4.  How often during the last year have you found that you were not able to stop drinking once you had started? Never   5.  How often during the last year have you failed to do what was normally expected of you because of drinking? Never   6.  How often during the last year have you needed a first drink in the morning to get yourself going after a heavy drinking session? Never   7.  How often during the last year have you had a feeling of guilt or remorse after drinking? Never   8.  How often during the last year have you been unable to remember what happened the night before because of your drinking? Never   9.  Have you or someone else been injured because of your drinking? No   10. Has a relative, friend, doctor or other health care worker been concerned about your drinking or suggested you cut down? No   TOTAL SCORE 5       Last PSA: No results found for: PSA    Reviewed orders with patient. Reviewed health maintenance and updated orders accordingly - Yes  Patient Active Problem List   Diagnosis     Hypertension goal BP (blood pressure) < 140/90     Hyperlipidemia LDL goal <100     Advanced directives, counseling/discussion     History of gout     Impaired fasting glucose     History of colonic polyps     History of total knee arthroplasty, bilateral     Morbid obesity (H)     Past Surgical History:   Procedure Laterality Date     AS TOTAL KNEE ARTHROPLASTY Bilateral 08/2019     COLONOSCOPY N/A 8/13/2020    Procedure: Colonoscopy, With Polypectomy And Biopsy;  Surgeon: Alisson Waller DO;  Location: [facility] OR     COLONOSCOPY WITH  CO2 INSUFFLATION N/A 2020    Procedure: COLONOSCOPY, WITH CO2 INSUFFLATION;  Surgeon: Alisson Waller DO;  Location: [facility] OR     DISCECTOMY LUMBAR POSTERIOR MICROSCOPIC ONE LEVEL Left 3/24/2015    Procedure: DISCECTOMY LUMBAR POSTERIOR MICROSCOPIC ONE LEVEL;  Surgeon: Cricket Stone MD;  Location: [facility] OR     left carpal tunnel release  10/2018     Left MMT arthroscopy  2012     ORTHOPEDIC SURGERY  2011    Rt knee     ORTHOPEDIC SURGERY  3/9/2012    Lt knee     [place] SPINE SURGERY PROCEDURE UNLISTED      and also laminectomy, unsure what level     Rt MMT arthroscopy         Social History     Tobacco Use     Smoking status: Former Smoker     Years: 30.00     Types: Cigars     Quit date: 6/3/1979     Years since quittin.8     Smokeless tobacco: Never Used   Substance Use Topics     Alcohol use: Yes     Family History   Problem Relation Age of Onset     Cardiovascular Father      Heart Disease Sister          Current Outpatient Medications   Medication Sig Dispense Refill     Acetaminophen (TYLENOL 8 HOUR PO) Take  by mouth. As needed       amLODIPine (NORVASC) 5 MG tablet TAKE 1 TABLET(5 MG) BY MOUTH DAILY 90 tablet 3     carvedilol (COREG) 25 MG tablet TAKE 1 TABLET(25 MG) BY MOUTH TWICE DAILY WITH MEALS 180 tablet 3     rosuvastatin (CRESTOR) 10 MG tablet Take 1 tablet (10 mg) by mouth daily 90 tablet 3     spironolactone (ALDACTONE) 50 MG tablet TAKE 1 TABLET(50 MG) BY MOUTH TWICE DAILY 180 tablet 3     aspirin (ASA) 325 MG tablet Take 325 mg by mouth daily (Patient not taking: Reported on 2021)       indomethacin (INDOCIN) 25 MG capsule TAKE 2 CAPSULES BY MOUTH TWICE DAILY WITH MEALS AS NEEDED (Patient not taking: Reported on 2021) 30 capsule 0     Allergies   Allergen Reactions     Penicillins Unknown     Other reaction(s): *Unknown - Childhood Rxn     Shellfish Allergy        Reviewed and updated as needed this visit by clinical staff   Tobacco  Allergies  Meds   Med Hx  Surg Hx   "Fam Hx  Soc Hx        Reviewed and updated as needed this visit by Provider                     Review of Systems   Constitutional: Negative for chills and fever.   HENT: Negative for congestion, ear pain, hearing loss and sore throat.    Eyes: Negative for pain and visual disturbance.   Respiratory: Negative for cough and shortness of breath.    Cardiovascular: Positive for peripheral edema. Negative for chest pain and palpitations.   Gastrointestinal: Negative for abdominal pain, constipation, diarrhea, heartburn and hematochezia.   Genitourinary: Negative for dysuria, frequency, genital sores, hematuria, impotence, penile discharge and urgency.   Musculoskeletal: Positive for arthralgias. Negative for joint swelling and myalgias.   Skin: Negative for rash.   Neurological: Negative for dizziness, weakness, headaches and paresthesias.   Psychiatric/Behavioral: Negative for mood changes. The patient is not nervous/anxious.          OBJECTIVE:   /70 (BP Location: Right arm, Patient Position: Sitting, Cuff Size: Adult Large)   Pulse 69   Temp 98.4  F (36.9  C) (Oral)   Ht 1.866 m (6' 1.47\")   Wt 126.1 kg (278 lb)   SpO2 97%   BMI 36.21 kg/m      Physical Exam  GENERAL: alert, no distress and obese  EYES: Eyes grossly normal to inspection, PERRL and conjunctivae and sclerae normal  HENT: Grossly normal  NECK: no adenopathy, no asymmetry, masses, or scars and thyroid normal to palpation  RESP: lungs clear to auscultation - no rales, rhonchi or wheezes  CV: regular rate and rhythm, normal S1 S2, no S3 or S4, no murmur, click or rub, no peripheral edema and peripheral pulses intact  ABDOMEN: soft, nontender, no hepatosplenomegaly, no masses   MS: no gross musculoskeletal defects noted, no edema.  Well-healed surgical scars over the knees from his previous TKAs.  SKIN: no suspicious lesions or rashes  NEURO: Normal strength and tone, mentation intact and speech normal  PSYCH: mentation appears normal, affect " normal/bright    Diagnostic Test Results:  Labs reviewed in Epic    ASSESSMENT/PLAN:       ICD-10-CM    1. Routine general medical examination at a health care facility  Z00.00 ZOSTER VACCINE RECOMBINANT (Shingrix)     TDAP VACCINE (Adacel, Boostrix)  [[id number]]     SCREENING QUESTIONS FOR ADULT IMMUNIZATIONS     Prostate Specific Antigen Screen     Prostate Specific Antigen Screen   2. Hypertension goal BP (blood pressure) < 140/90  I10 amLODIPine (NORVASC) 5 MG tablet     carvedilol (COREG) 25 MG tablet     spironolactone (ALDACTONE) 50 MG tablet     CBC with platelets     CBC with platelets   3. Hyperlipidemia LDL goal <100  E78.5 rosuvastatin (CRESTOR) 10 MG tablet     Lipid panel reflex to direct LDL Fasting     ALT     ALT     Lipid panel reflex to direct LDL Fasting   4. Impaired fasting glucose  R73.01 Basic metabolic panel     Hemoglobin A1c     Hemoglobin A1c     Basic metabolic panel   5. Morbid obesity (H)  E66.01    6. History of gout  Z87.39 Uric acid     Uric acid   7. History of colonic polyps  Z86.010    8. History of total knee arthroplasty, bilateral  Z96.653      Blood pressure and other vital signs look good  We discussed the above items  We will check fasting lab work today as above  Continue same baseline meds  He was encouraged on diet and exercise treatment for weight loss  He was advised to reduce his alcohol consumption  Plan a repeat colonoscopy in August of next year for his history of colon polyps  We will give him a tetanus booster and shingles vaccine today  He declined a COVID vaccine booster  He was advised to obtain a shingles booster dose in 2 to 6 months  Plan a recheck in 1 year, or sooner prn      COUNSELING:   Reviewed preventive health counseling, as reflected in patient instructions       Regular exercise       Healthy diet/nutrition       Immunizations    Vaccinated for: TDAP and Zoster          Estimated body mass index is 36.21 kg/m  as calculated from the following:     "Height as of this encounter: 1.866 m (6' 1.47\").    Weight as of this encounter: 126.1 kg (278 lb).     Weight management plan: Discussed healthy diet and exercise guidelines    He reports that he quit smoking about 42 years ago. His smoking use included cigars. He quit after 30.00 years of use. He has never used smokeless tobacco.      Counseling Resources:  ATP IV Guidelines  Pooled Cohorts Equation Calculator  FRAX Risk Assessment  ICSI Preventive Guidelines  Dietary Guidelines for Americans, 2010  USDA's MyPlate  ASA Prophylaxis  Lung CA Screening    Amaury Solares MD  [facility]  "

## 2022-03-30 NOTE — RESULT ENCOUNTER NOTE
Andrew,  Your lab work shows that your cholesterol values are nicely improved from the past.  Your uric acid level is lower and improved.  Your liver and kidney tests and PSA all look fine.  Blood counts are okay.  Your blood sugar is significantly higher than the past, however, and is getting into a mild diabetes range.  I would really encourage you to cut down on alcohol intake and work hard on healthy eating and regular exercise to lose some weight.I would suggest another recheck in 1 year (or sooner if/as needed).    Amaury Solares MD

## 2022-04-19 ENCOUNTER — OFFICE VISIT (OUTPATIENT)
Dept: FAMILY MEDICINE | Facility: CLINIC | Age: 62
End: 2022-04-19
Payer: COMMERCIAL

## 2022-04-19 VITALS
HEART RATE: 65 BPM | TEMPERATURE: 98.7 F | DIASTOLIC BLOOD PRESSURE: 65 MMHG | BODY MASS INDEX: 37 KG/M2 | OXYGEN SATURATION: 97 % | SYSTOLIC BLOOD PRESSURE: 102 MMHG | WEIGHT: 284 LBS

## 2022-04-19 DIAGNOSIS — M25.522 LEFT ELBOW PAIN: Primary | ICD-10-CM

## 2022-04-19 PROCEDURE — 99213 OFFICE O/P EST LOW 20 MIN: CPT | Performed by: FAMILY MEDICINE

## 2022-04-19 RX ORDER — DICLOFENAC SODIUM 75 MG/1
75 TABLET, DELAYED RELEASE ORAL 2 TIMES DAILY
Qty: 30 TABLET | Refills: 1 | Status: SHIPPED | OUTPATIENT
Start: 2022-04-19 | End: 2023-08-08

## 2022-04-19 NOTE — PROGRESS NOTES
Assessment & Plan       ICD-10-CM    1. Left elbow pain  M25.522 diclofenac (VOLTAREN) 75 MG EC tablet     He appears to have an overuse inflammation of the left forearm and elbow with tendinitis and bursitis    I recommended relative rest, ice, and diclofenac 75 mg twice a day for up to 2 weeks    Return in about 2 weeks (around 5/3/2022) for a recheck if symptoms worsen or fail to improve.    Amaury Solares MD  [facility] MARY Neumann is a 61 year old who presents for the following health issues     Pain    History of Present Illness       Reason for visit:  Sore elbow  Symptom onset:  3-7 days ago  Symptoms include:  Swelling and pain  Symptom intensity:  Severe  Symptom progression:  Staying the same  Had these symptoms before:  No  What makes it worse:  No  What makes it better:  Ice    He eats 2-3 servings of fruits and vegetables daily.He consumes 1 sweetened beverage(s) daily.He exercises with enough effort to increase his heart rate 10 to 19 minutes per day.  He exercises with enough effort to increase his heart rate 4 days per week.   He is taking medications regularly.       Pain History:  When did you first notice your pain? - Acute Pain   Have you seen anyone else for your pain? No  Where in your body do you have pain? Musculoskeletal problem/pain  Onset/Duration: 3 days  Description  Location: elbow - left  Joint Swelling: YES  Redness: YES  Pain: YES  Warmth: YES  Intensity:  severe  Progression of Symptoms:  same and constant  Accompanying signs and symptoms:   Fevers: no  Numbness/tingling/weakness: no  History  Trauma to the area: Over use , chopping wood last Saturday  Recent illness:  no  Previous similar problem: no  Previous evaluation:  no  Precipitating or alleviating factors:  Aggravating factors include: lifting and overuse  Therapies tried and outcome: ice and Ibuprofen not helpful    His left elbow and arm are feeling fine until after he done all the  splitting and chopping of wood on Saturday.  That evening his elbow started hurting.  He has had some swelling and discomfort in the elbow and forearm region since then.  There is no specific injury or trauma.  He is on baseline medications as below.    Patient Active Problem List   Diagnosis     Hypertension goal BP (blood pressure) < 140/90     Hyperlipidemia LDL goal <100     Advanced directives, counseling/discussion     History of gout     Impaired fasting glucose     History of colonic polyps     History of total knee arthroplasty, bilateral     Morbid obesity (H)     Current Outpatient Medications   Medication     amLODIPine (NORVASC) 5 MG tablet     carvedilol (COREG) 25 MG tablet         rosuvastatin (CRESTOR) 10 MG tablet     spironolactone (ALDACTONE) 50 MG tablet     Acetaminophen (TYLENOL 8 HOUR PO)     aspirin (ASA) 325 MG tablet     indomethacin (INDOCIN) 25 MG capsule     No current facility-administered medications for this visit.           Review of Systems   Constitutional, HEENT, cardiovascular, pulmonary, gi and gu systems are negative, except as otherwise noted.      Objective    /65 (BP Location: Right arm, Patient Position: Sitting, Cuff Size: Adult Large)   Pulse 65   Temp 98.7  F (37.1  C) (Oral)   Wt 128.8 kg (284 lb)   SpO2 97%   BMI 37.00 kg/m    Body mass index is 37 kg/m .  Physical Exam   GENERAL: alert, no distress and over weight  MS: He has some generalized swelling of the left forearm up into the left elbow region.  There is warmth to the touch and minimal erythema.  There is tenderness to palpation over the left olecranon region with some mild swelling there as well.  It is difficult for him to fully extend his left elbow due to discomfort and swelling. He is not specifically tender over the left lateral epicondyle.  He has minimal increased discomfort with resistance to wrist extension or middle finger extension.

## 2022-04-22 DIAGNOSIS — I10 HYPERTENSION GOAL BP (BLOOD PRESSURE) < 140/90: ICD-10-CM

## 2022-04-24 RX ORDER — CARVEDILOL 25 MG/1
TABLET ORAL
Qty: 180 TABLET | Refills: 3 | OUTPATIENT
Start: 2022-04-24

## 2022-04-24 RX ORDER — SPIRONOLACTONE 50 MG/1
TABLET, FILM COATED ORAL
Qty: 180 TABLET | Refills: 3 | OUTPATIENT
Start: 2022-04-24

## 2022-04-25 NOTE — TELEPHONE ENCOUNTER
Duplicate request for Carvedilol (coreg) and Spironolactone. Both were sent for a year supply on 3/30/22.    Jasmin Roy RN

## 2022-10-06 NOTE — CONFIDENTIAL NOTE
Medication is being filled for 1 time refill only due to:  Patient needs to be seen because need recheck.   Please schedule.  Tiffanie Cavanaugh RN        
patient

## 2023-04-05 DIAGNOSIS — I10 HYPERTENSION GOAL BP (BLOOD PRESSURE) < 140/90: ICD-10-CM

## 2023-04-05 RX ORDER — AMLODIPINE BESYLATE 5 MG/1
TABLET ORAL
Qty: 90 TABLET | Refills: 0 | Status: SHIPPED | OUTPATIENT
Start: 2023-04-05 | End: 2023-07-05

## 2023-04-21 DIAGNOSIS — I10 HYPERTENSION GOAL BP (BLOOD PRESSURE) < 140/90: ICD-10-CM

## 2023-04-23 ENCOUNTER — HEALTH MAINTENANCE LETTER (OUTPATIENT)
Age: 63
End: 2023-04-23

## 2023-04-24 RX ORDER — SPIRONOLACTONE 50 MG/1
TABLET, FILM COATED ORAL
Qty: 180 TABLET | Refills: 0 | Status: SHIPPED | OUTPATIENT
Start: 2023-04-24 | End: 2023-07-17

## 2023-05-06 DIAGNOSIS — I10 HYPERTENSION GOAL BP (BLOOD PRESSURE) < 140/90: ICD-10-CM

## 2023-05-08 RX ORDER — CARVEDILOL 25 MG/1
TABLET ORAL
Qty: 180 TABLET | Refills: 0 | Status: SHIPPED | OUTPATIENT
Start: 2023-05-08 | End: 2023-08-07

## 2023-06-02 ENCOUNTER — HEALTH MAINTENANCE LETTER (OUTPATIENT)
Age: 63
End: 2023-06-02

## 2023-07-15 DIAGNOSIS — E78.5 HYPERLIPIDEMIA LDL GOAL <100: ICD-10-CM

## 2023-07-15 DIAGNOSIS — I10 HYPERTENSION GOAL BP (BLOOD PRESSURE) < 140/90: ICD-10-CM

## 2023-07-17 RX ORDER — SPIRONOLACTONE 50 MG/1
TABLET, FILM COATED ORAL
Qty: 60 TABLET | Refills: 0 | Status: SHIPPED | OUTPATIENT
Start: 2023-07-17 | End: 2023-08-08

## 2023-07-17 RX ORDER — ROSUVASTATIN CALCIUM 10 MG/1
10 TABLET, COATED ORAL DAILY
Qty: 30 TABLET | Refills: 0 | Status: SHIPPED | OUTPATIENT
Start: 2023-07-17 | End: 2023-08-08

## 2023-07-17 NOTE — TELEPHONE ENCOUNTER
Patient scheduled future appointment 08/08/23 but needs fill till then.    Please advise    Fatoumata Ramires

## 2023-08-07 DIAGNOSIS — I10 HYPERTENSION GOAL BP (BLOOD PRESSURE) < 140/90: ICD-10-CM

## 2023-08-07 RX ORDER — CARVEDILOL 25 MG/1
TABLET ORAL
Qty: 180 TABLET | Refills: 0 | Status: SHIPPED | OUTPATIENT
Start: 2023-08-07 | End: 2023-08-08

## 2023-08-08 ENCOUNTER — OFFICE VISIT (OUTPATIENT)
Dept: FAMILY MEDICINE | Facility: CLINIC | Age: 63
End: 2023-08-08
Payer: COMMERCIAL

## 2023-08-08 VITALS
WEIGHT: 292 LBS | HEART RATE: 64 BPM | HEIGHT: 73 IN | SYSTOLIC BLOOD PRESSURE: 132 MMHG | TEMPERATURE: 98.4 F | DIASTOLIC BLOOD PRESSURE: 87 MMHG | BODY MASS INDEX: 38.7 KG/M2

## 2023-08-08 DIAGNOSIS — R73.01 IMPAIRED FASTING GLUCOSE: ICD-10-CM

## 2023-08-08 DIAGNOSIS — K59.01 SLOW TRANSIT CONSTIPATION: ICD-10-CM

## 2023-08-08 DIAGNOSIS — M75.41 IMPINGEMENT SYNDROME OF SHOULDER REGION, RIGHT: ICD-10-CM

## 2023-08-08 DIAGNOSIS — Z86.0100 HISTORY OF COLONIC POLYPS: ICD-10-CM

## 2023-08-08 DIAGNOSIS — E11.9 TYPE 2 DIABETES MELLITUS WITHOUT COMPLICATION, WITHOUT LONG-TERM CURRENT USE OF INSULIN (H): Primary | ICD-10-CM

## 2023-08-08 DIAGNOSIS — I10 HYPERTENSION GOAL BP (BLOOD PRESSURE) < 140/90: Primary | ICD-10-CM

## 2023-08-08 DIAGNOSIS — E66.01 MORBID OBESITY (H): ICD-10-CM

## 2023-08-08 DIAGNOSIS — E78.5 HYPERLIPIDEMIA LDL GOAL <100: ICD-10-CM

## 2023-08-08 DIAGNOSIS — Z23 NEED FOR SHINGLES VACCINE: ICD-10-CM

## 2023-08-08 LAB
ALT SERPL W P-5'-P-CCNC: 25 U/L (ref 0–70)
ANION GAP SERPL CALCULATED.3IONS-SCNC: 12 MMOL/L (ref 7–15)
BUN SERPL-MCNC: 20.5 MG/DL (ref 8–23)
CALCIUM SERPL-MCNC: 9.4 MG/DL (ref 8.8–10.2)
CHLORIDE SERPL-SCNC: 104 MMOL/L (ref 98–107)
CHOLEST SERPL-MCNC: 155 MG/DL
CREAT SERPL-MCNC: 1.17 MG/DL (ref 0.67–1.17)
DEPRECATED HCO3 PLAS-SCNC: 21 MMOL/L (ref 22–29)
ERYTHROCYTE [DISTWIDTH] IN BLOOD BY AUTOMATED COUNT: 12.5 % (ref 10–15)
GFR SERPL CREATININE-BSD FRML MDRD: 70 ML/MIN/1.73M2
GLUCOSE SERPL-MCNC: 136 MG/DL (ref 70–99)
HBA1C MFR BLD: 6 % (ref 0–5.6)
HCT VFR BLD AUTO: 37.9 % (ref 40–53)
HDLC SERPL-MCNC: 48 MG/DL
HGB BLD-MCNC: 13 G/DL (ref 13.3–17.7)
LDLC SERPL CALC-MCNC: 59 MG/DL
MCH RBC QN AUTO: 31.6 PG (ref 26.5–33)
MCHC RBC AUTO-ENTMCNC: 34.3 G/DL (ref 31.5–36.5)
MCV RBC AUTO: 92 FL (ref 78–100)
NONHDLC SERPL-MCNC: 107 MG/DL
PLATELET # BLD AUTO: 181 10E3/UL (ref 150–450)
POTASSIUM SERPL-SCNC: 4.6 MMOL/L (ref 3.4–5.3)
RBC # BLD AUTO: 4.12 10E6/UL (ref 4.4–5.9)
SODIUM SERPL-SCNC: 137 MMOL/L (ref 136–145)
TRIGL SERPL-MCNC: 242 MG/DL
WBC # BLD AUTO: 6.4 10E3/UL (ref 4–11)

## 2023-08-08 PROCEDURE — 36415 COLL VENOUS BLD VENIPUNCTURE: CPT | Performed by: FAMILY MEDICINE

## 2023-08-08 PROCEDURE — 85027 COMPLETE CBC AUTOMATED: CPT | Performed by: FAMILY MEDICINE

## 2023-08-08 PROCEDURE — 83036 HEMOGLOBIN GLYCOSYLATED A1C: CPT | Performed by: FAMILY MEDICINE

## 2023-08-08 PROCEDURE — 80048 BASIC METABOLIC PNL TOTAL CA: CPT | Performed by: FAMILY MEDICINE

## 2023-08-08 PROCEDURE — 90750 HZV VACC RECOMBINANT IM: CPT | Performed by: FAMILY MEDICINE

## 2023-08-08 PROCEDURE — 90471 IMMUNIZATION ADMIN: CPT | Performed by: FAMILY MEDICINE

## 2023-08-08 PROCEDURE — 84460 ALANINE AMINO (ALT) (SGPT): CPT | Performed by: FAMILY MEDICINE

## 2023-08-08 PROCEDURE — 80061 LIPID PANEL: CPT | Performed by: FAMILY MEDICINE

## 2023-08-08 PROCEDURE — 99214 OFFICE O/P EST MOD 30 MIN: CPT | Mod: 25 | Performed by: FAMILY MEDICINE

## 2023-08-08 RX ORDER — ROSUVASTATIN CALCIUM 10 MG/1
10 TABLET, COATED ORAL DAILY
Qty: 90 TABLET | Refills: 3 | Status: SHIPPED | OUTPATIENT
Start: 2023-08-08 | End: 2024-08-12

## 2023-08-08 RX ORDER — AMLODIPINE BESYLATE 5 MG/1
TABLET ORAL
Qty: 90 TABLET | Refills: 3 | Status: SHIPPED | OUTPATIENT
Start: 2023-08-08 | End: 2024-10-02

## 2023-08-08 RX ORDER — CARVEDILOL 25 MG/1
TABLET ORAL
Qty: 180 TABLET | Refills: 3 | Status: SHIPPED | OUTPATIENT
Start: 2023-08-08 | End: 2024-08-12

## 2023-08-08 RX ORDER — SPIRONOLACTONE 50 MG/1
TABLET, FILM COATED ORAL
Qty: 180 TABLET | Refills: 3 | Status: SHIPPED | OUTPATIENT
Start: 2023-08-08 | End: 2024-08-15

## 2023-08-08 RX ORDER — ASPIRIN 81 MG/1
81 TABLET ORAL DAILY
COMMUNITY
Start: 2023-08-08

## 2023-08-08 ASSESSMENT — PAIN SCALES - GENERAL: PAINLEVEL: NO PAIN (0)

## 2023-08-08 NOTE — RESULT ENCOUNTER NOTE
Andrew,  Your cholesterol values remain nicely improved from previously.  The rest of your labs are fairly stable from the past, although your fasting blood sugar remains elevated above 125, which means you now meet criteria for type 2 diabetes.  I would recommend starting a low-dose aspirin of 81 mg daily for that and also meeting with one of our diabetes educators so that they can help advise you on diet and exercise treatment for this.  We do not need to add any further prescription medication at this time.  You can get the adult low-dose aspirin from any drugstore without a prescription.  I will have someone call you in the next few days to help you set up an appointment with one of our diabetes educators.  I would also recommend a follow-up visit with me in about 6 months to see how things are going with your blood sugars.    Amaury Solares MD

## 2023-08-08 NOTE — NURSING NOTE
Prior to immunization administration, verified patients identity using patient s name and date of birth. Please see Immunization Activity for additional information.     Screening Questionnaire for Adult Immunization    Are you sick today?   No   Do you have allergies to medications, food, a vaccine component or latex?   Yes   Have you ever had a serious reaction after receiving a vaccination?   No   Do you have a long-term health problem with heart, lung, kidney, or metabolic disease (e.g., diabetes), asthma, a blood disorder, no spleen, complement component deficiency, a cochlear implant, or a spinal fluid leak?  Are you on long-term aspirin therapy?   No   Do you have cancer, leukemia, HIV/AIDS, or any other immune system problem?   No   Do you have a parent, brother, or sister with an immune system problem?   No   In the past 3 months, have you taken medications that affect  your immune system, such as prednisone, other steroids, or anticancer drugs; drugs for the treatment of rheumatoid arthritis, Crohn s disease, or psoriasis; or have you had radiation treatments?   No   Have you had a seizure, or a brain or other nervous system problem?   No   During the past year, have you received a transfusion of blood or blood    products, or been given immune (gamma) globulin or antiviral drug?   No   For women: Are you pregnant or is there a chance you could become       pregnant during the next month?   No   Have you received any vaccinations in the past 4 weeks?   No     Immunization questionnaire was positive for at least one answer.  Notified Dr. Solares.      Patient instructed to remain in clinic for 15 minutes afterwards, and to report any adverse reactions.     Screening performed by Carley Jenkins CMA on 8/8/2023 at 9:37 AM.    VIS for Shingles given on same date of administration.  Staff signature/Title: Carley BECKHAM MA

## 2023-08-08 NOTE — PROGRESS NOTES
Patient has had his second fasting glucose above 125 this morning, therefore meeting criteria for type 2 diabetes.  I will refer him to diabetes education for further instruction on diet and exercise treatment for his diabetes.  I will also advise him to start low-dose aspirin and return in about 6 months for a follow-up visit.    Amaury Solares MD

## 2023-08-08 NOTE — PROGRESS NOTES
"  Assessment & Plan       ICD-10-CM    1. Hypertension goal BP (blood pressure) < 140/90  I10 amLODIPine (NORVASC) 5 MG tablet     carvedilol (COREG) 25 MG tablet     spironolactone (ALDACTONE) 50 MG tablet     Basic metabolic panel     CBC with platelets      2. Hyperlipidemia LDL goal <100  E78.5 rosuvastatin (CRESTOR) 10 MG tablet     ALT     Lipid panel reflex to direct LDL Fasting      3. Need for shingles vaccine  Z23       4. Impaired fasting glucose  R73.01 Hemoglobin A1c      5. Morbid obesity (H)  E66.01       6. History of colonic polyps  Z86.010 Colonoscopy Screening  Referral      7. Impingement syndrome of shoulder region, right  M75.41       8. Slow transit constipation  K59.01         We discussed the above items  We will check fasting lab work today as above  We will plan to continue his same baseline meds  He is due for his second shingles vaccine, so we will give him that today  Will refer him for 3-year follow-up colonoscopy  I recommended some stretching and strengthening exercises for his right shoulder  I offered him physical therapy for that, but if he wants to try to look up some exercises online first  I recommended plenty of fluids and fiber and exercise for his constipation  Discussed possible role for a stool softener, fiber supplement, or polyethylene glycol product if needed as well  Plan a tentative recheck in 1 year, or sooner prn         BMI:   Estimated body mass index is 38.52 kg/m  as calculated from the following:    Height as of this encounter: 1.854 m (6' 1\").    Weight as of this encounter: 132.5 kg (292 lb).   Weight management plan: Discussed healthy diet and exercise guidelines        Amaury Solares MD  [facility] MARY Neumann is a 63 year old, presenting for the following health issues:  Hypertension      8/8/2023     8:52 AM   Additional Questions   Roomed by cam   Accompanied by none         8/8/2023     8:52 AM   Patient " "Reported Additional Medications   Patient reports taking the following new medications none       History of Present Illness       Hypertension: He presents for follow up of hypertension.  He does not check blood pressure  regularly outside of the clinic. Outpatient blood pressures have not been over 140/90. He does not follow a low salt diet.     He eats 0-1 servings of fruits and vegetables daily.He consumes 1 sweetened beverage(s) daily.He exercises with enough effort to increase his heart rate 9 or less minutes per day.  He exercises with enough effort to increase his heart rate 3 or less days per week.   He is taking medications regularly.     He remains on amlodipine, carvedilol, and spironolactone for hypertension.  He takes rosuvastatin for hyperlipidemia.  He feels like these medicines work well for him.  He has had some mild shoulder discomfort recently and had questions about that.  He has also had some constipation.  Review of his chart shows that he is due for 3-year follow-up colonoscopy for history of colon polyps.    Patient Active Problem List   Diagnosis    Hypertension goal BP (blood pressure) < 140/90    Hyperlipidemia LDL goal <100    Advanced directives, counseling/discussion    History of gout    Impaired fasting glucose    History of colonic polyps    History of total knee arthroplasty, bilateral    Morbid obesity (H)     Current Outpatient Medications   Medication    amLODIPine (NORVASC) 5 MG tablet    carvedilol (COREG) 25 MG tablet    rosuvastatin (CRESTOR) 10 MG tablet    spironolactone (ALDACTONE) 50 MG tablet     No current facility-administered medications for this visit.           Review of Systems   Constitutional, HEENT, cardiovascular, pulmonary, gi and gu systems are negative, except as otherwise noted.      Objective    /87 (BP Location: Right arm, Patient Position: Sitting, Cuff Size: Adult Large)   Pulse 64   Temp 98.4  F (36.9  C) (Temporal)   Ht 1.854 m (6' 1\")   Wt " 132.5 kg (292 lb)   BMI 38.52 kg/m    Body mass index is 38.52 kg/m .  Physical Exam   GENERAL: alert, no distress, and obese  RESP: lungs clear to auscultation - no rales, rhonchi or wheezes  CV: regular rate and rhythm, normal S1 S2, no S3 or S4, no murmur, click or rub, trace peripheral edema   MS: He has full range of motion with his right shoulder.  No significant pain when reaching his right arm behind his back or with a supraspinatus empty can test.  He does have mildly positive impingement signs with discomfort when he crosses his right arm in front of his chest.    Prior laboratory test results were reviewed.  Prior colonoscopy report was reviewed.

## 2023-09-22 ENCOUNTER — TELEPHONE (OUTPATIENT)
Dept: GASTROENTEROLOGY | Facility: CLINIC | Age: 63
End: 2023-09-22
Payer: COMMERCIAL

## 2023-09-22 NOTE — TELEPHONE ENCOUNTER
"Endoscopy Scheduling Screen    Have you had a positive Covid test in the last 14 days?  No    Are you active on MyChart?   Yes    What insurance is in the chart?  Other:  [employer]    Ordering/Referring Provider:     Amaury Solares,      (If ordering provider performs procedure, schedule with ordering provider unless otherwise instructed. )    BMI: Estimated body mass index is 38.52 kg/m  as calculated from the following:    Height as of 8/8/23: 1.854 m (6' 1\").    Weight as of 8/8/23: 132.5 kg (292 lb).     Sedation Ordered  moderate sedation.   If patient BMI > 50 do not schedule in [facility].    If patient BMI > 45 do not schedule at [facility].    Are you taking methadone or Suboxone?  No    Are you taking any prescription medications for pain 3 or more times per week?   No    Do you have a history of malignant hyperthermia or adverse reaction to anesthesia?  No    (Females) Are you currently pregnant?   No     Have you been diagnosed or told you have pulmonary hypertension?   No    Do you have an LVAD?  No    Have you been told you have moderate to severe sleep apnea?  No    Have you been told you have COPD, asthma, or any other lung disease?  No    Do you have any heart conditions?  No     Have you ever had an organ transplant?   No    Have you ever had or are you awaiting a heart or lung transplant?   No    Have you had a stroke or transient ischemic attack (TIA aka \"mini stroke\" in the last 6 months?   No    Have you been diagnosed with or been told you have cirrhosis of the liver?   No    Are you currently on dialysis?   No    Do you need assistance transferring?   No    BMI: Estimated body mass index is 38.52 kg/m  as calculated from the following:    Height as of 8/8/23: 1.854 m (6' 1\").    Weight as of 8/8/23: 132.5 kg (292 lb).     Is patients BMI > 40 and scheduling location [facility]?  No    Do you take an injectable medication for weight loss or diabetes (excluding insulin)?  No    Do you take the medication " Naltrexone?  No    Do you take blood thinners?  No       Prep   Are you currently on dialysis or do you have chronic kidney disease?  No    Do you have a diagnosis of diabetes?  No    Do you have a diagnosis of cystic fibrosis (CF)?  No    On a regular basis do you go 3 -5 days between bowel movements?  Extended prep vaires for constipation    BMI > 40?  No    [facility] [address]  Phone: [phone] Fax: [phone]      Final Scheduling Details   Colonoscopy prep sent?  Golytely Extended Prep    Procedure scheduled  Colonoscopy    Surgeon:  Hayder     Date of procedure:  11/27/23     Pre-OP / PAC:   No - Not required for this site.    Location  [facility] - [facility] - Per order.    Sedation   Moderate Sedation - Per order.      Patient Reminders:   You will receive a call from a Nurse to review instructions and health history.  This assessment must be completed prior to your procedure.  Failure to complete the Nurse assessment may result in the procedure being cancelled.      On the day of your procedure, please designate an adult(s) who can drive you home stay with you for the next 24 hours. The medicines used in the exam will make you sleepy. You will not be able to drive.      You cannot take public transportation, ride share services, or non-medical taxi service without a responsible caregiver.  Medical transport services are allowed with the requirement that a responsible caregiver will receive you at your destination.  We require that drivers and caregivers are confirmed prior to your procedure.

## 2023-11-08 ENCOUNTER — TELEPHONE (OUTPATIENT)
Dept: GASTROENTEROLOGY | Facility: CLINIC | Age: 63
End: 2023-11-08
Payer: COMMERCIAL

## 2023-11-08 DIAGNOSIS — Z86.0100 HISTORY OF COLONIC POLYPS: Primary | ICD-10-CM

## 2023-11-08 RX ORDER — BISACODYL 5 MG/1
TABLET, DELAYED RELEASE ORAL
Qty: 4 TABLET | Refills: 0 | Status: SHIPPED | OUTPATIENT
Start: 2023-11-08

## 2023-11-08 NOTE — TELEPHONE ENCOUNTER
Pre visit planning completed.      Procedure details:    Patient scheduled for Colonoscopy  on 11/27/23.     Arrival time: 1100. Procedure time 1145    Pre op exam needed? N/A    Facility location: [facility]; [address], [place] [address]    Sedation type: Conscious sedation     Indication for procedure: Hx of polyps       Chart review:     Electronic implanted devices? No    Recent diagnosis of diverticulitis within the last 6 weeks? No    Diabetic? Yes. Not on diabetic medication      Medication review:    Anticoagulants? No    NSAIDS? No NSAID medications per patient's medication list.  RN will verify with pre-assessment call.    Other medication HOLDING recommendations:  N/A      Prep for procedure:     Bowel prep recommendation: Extended prep Golytely    Due to:  poor prep/inadequate bowel prep in the past.  Pt also reports constipation.     Prep instructions sent via [facility] Bowel prep script sent to      [place], [place] - [address]alba RN  Endoscopy Procedure Pre Assessment RN  [phone] option 4

## 2023-11-10 NOTE — TELEPHONE ENCOUNTER
Attempted to contact patient in order to complete pre assessment questions.     No answer. Left message to return call to [phone] option 4      Catherine Carpenter RN  Endoscopy Procedure Pre Assessment RN

## 2023-11-14 NOTE — TELEPHONE ENCOUNTER
Pre assessment completed for upcoming procedure.    Procedure details:    Arrival time and facility location reviewed.    Pre op exam needed? N/A    Designated  policy reviewed. Instructed to have someone stay 6 hours post procedure.     COVID policy reviewed.      Medication review:    Medications reviewed. Please see supporting documentation below. Holding recommendations discussed (if applicable).       Prep for procedure:     Reviewed procedure prep instructions.     Patient verbalized understanding and had no questions or concerns at this time.        Catherine David RN  Endoscopy Procedure Pre Assessment RN  [phone] option 4

## 2023-11-24 ENCOUNTER — ANESTHESIA EVENT (OUTPATIENT)
Dept: SURGERY | Facility: AMBULATORY SURGERY CENTER | Age: 63
End: 2023-11-24
Payer: COMMERCIAL

## 2023-11-24 NOTE — ANESTHESIA PREPROCEDURE EVALUATION
Anesthesia Pre-Procedure Evaluation    Patient: Thien Zarate   MRN: [id number] : 1960        Procedure : Procedure(s):  Colonoscopy with CO2 insufflation          Past Medical History:   Diagnosis Date    Arthritis     Gout     Hypertension     Type 2 diabetes mellitus without complication, without long-term current use of insulin (H) 2023      Past Surgical History:   Procedure Laterality Date    AS TOTAL KNEE ARTHROPLASTY Bilateral 2019    COLONOSCOPY N/A 2020    Procedure: Colonoscopy, With Polypectomy And Biopsy;  Surgeon: Alisson Waller DO;  Location: [facility] OR    COLONOSCOPY WITH CO2 INSUFFLATION N/A 2020    Procedure: COLONOSCOPY, WITH CO2 INSUFFLATION;  Surgeon: Alisson Waller DO;  Location: [facility] OR    DISCECTOMY LUMBAR POSTERIOR MICROSCOPIC ONE LEVEL Left 3/24/2015    Procedure: DISCECTOMY LUMBAR POSTERIOR MICROSCOPIC ONE LEVEL;  Surgeon: Cricket Stone MD;  Location: [facility] OR    left carpal tunnel release  10/2018    Left MMT arthroscopy  2012    ORTHOPEDIC SURGERY  2011    Rt knee    ORTHOPEDIC SURGERY  3/9/2012    Lt knee    DC SPINE SURGERY PROCEDURE UNLISTED      and also laminectomy, unsure what level    Rt MMT arthroscopy        Allergies   Allergen Reactions    Penicillins Unknown     Other reaction(s): *Unknown - Childhood Rxn    Shellfish Allergy       Social History     Tobacco Use    Smoking status: Former     Types: Cigars     Quit date: 6/3/1979     Years since quittin.5    Smokeless tobacco: Never   Substance Use Topics    Alcohol use: Yes      Wt Readings from Last 1 Encounters:   23 132.5 kg (292 lb)        Anesthesia Evaluation            ROS/MED HX  ENT/Pulmonary:  - neg pulmonary ROS     Neurologic:  - neg neurologic ROS     Cardiovascular:     (+)  hypertension- -   -  - -                                      METS/Exercise Tolerance: >4 METS    Hematologic:  - neg hematologic  ROS     Musculoskeletal:   (+)  arthritis,            "  GI/Hepatic:  - neg GI/hepatic ROS     Renal/Genitourinary:  - neg Renal ROS     Endo:     (+)  type II DM,             Obesity,       Psychiatric/Substance Use:  - neg psychiatric ROS     Infectious Disease:  - neg infectious disease ROS     Malignancy:  - neg malignancy ROS     Other:  - neg other ROS          Physical Exam    Airway  airway exam normal      Mallampati: I   TM distance: > 3 FB   Neck ROM: full   Mouth opening: > 3 cm    Respiratory Devices and Support         Dental       (+) Completely normal teeth      Cardiovascular   cardiovascular exam normal       Rhythm and rate: regular and normal     Pulmonary   pulmonary exam normal        breath sounds clear to auscultation           OUTSIDE LABS:  CBC:   Lab Results   Component Value Date    WBC 6.4 08/08/2023    WBC 8.3 03/30/2022    HGB 13.0 (L) 08/08/2023    HGB 12.7 (L) 03/30/2022    HCT 37.9 (L) 08/08/2023    HCT 38.0 (L) 03/30/2022     08/08/2023     03/30/2022     BMP:   Lab Results   Component Value Date     08/08/2023     03/30/2022    POTASSIUM 4.6 08/08/2023    POTASSIUM 4.6 03/30/2022    CHLORIDE 104 08/08/2023    CHLORIDE 108 03/30/2022    CO2 21 (L) 08/08/2023    CO2 23 03/30/2022    BUN 20.5 08/08/2023    BUN 23 03/30/2022    CR 1.17 08/08/2023    CR 1.05 03/30/2022     (H) 08/08/2023     (H) 03/30/2022     COAGS: No results found for: \"PTT\", \"INR\", \"FIBR\"  POC: No results found for: \"BGM\", \"HCG\", \"HCGS\"  HEPATIC:   Lab Results   Component Value Date    ALBUMIN 4.0 09/29/2021    PROTTOTAL 7.8 09/29/2021    ALT 25 08/08/2023    AST 14 09/29/2021    ALKPHOS 42 09/29/2021    BILITOTAL 0.6 09/29/2021     OTHER:   Lab Results   Component Value Date    A1C 6.0 (H) 08/08/2023    MARTÍN 9.4 08/08/2023    TSH 2.22 09/14/2017       Anesthesia Plan    ASA Status:  2    NPO Status:  NPO Appropriate    Anesthesia Type: MAC.     - Reason for MAC: Deep or markedly invasive procedure (G8)   Induction: Propofol. "   Maintenance: N/A.        Consents    Anesthesia Plan(s) and associated risks, benefits, and realistic alternatives discussed. Questions answered and patient/representative(s) expressed understanding.     - Discussed:     - Discussed with:  Patient       Use of blood products discussed: No .     Postoperative Care            Comments:           H&P reviewed: Unable to attach H&P to encounter due to EHR limitations. H&P Update: appropriate H&P reviewed, patient examined. No interval changes since H&P (within 30 days).         Jean-Pierre Solis, DO

## 2023-11-27 ENCOUNTER — ANESTHESIA (OUTPATIENT)
Dept: SURGERY | Facility: AMBULATORY SURGERY CENTER | Age: 63
End: 2023-11-27
Payer: COMMERCIAL

## 2023-11-27 ENCOUNTER — HOSPITAL ENCOUNTER (OUTPATIENT)
Facility: AMBULATORY SURGERY CENTER | Age: 63
Discharge: HOME | End: 2023-11-27
Attending: INTERNAL MEDICINE
Payer: COMMERCIAL

## 2023-11-27 VITALS — HEART RATE: 73 BPM

## 2023-11-27 VITALS
SYSTOLIC BLOOD PRESSURE: 120 MMHG | OXYGEN SATURATION: 98 % | HEART RATE: 68 BPM | DIASTOLIC BLOOD PRESSURE: 82 MMHG | TEMPERATURE: 97 F | RESPIRATION RATE: 16 BRPM

## 2023-11-27 LAB — COLONOSCOPY: NORMAL

## 2023-11-27 PROCEDURE — G8918 PT W/O PREOP ORDER IV AB PRO: HCPCS

## 2023-11-27 PROCEDURE — 88305 TISSUE EXAM BY PATHOLOGIST: CPT | Performed by: PATHOLOGY

## 2023-11-27 PROCEDURE — G8907 PT DOC NO EVENTS ON DISCHARG: HCPCS

## 2023-11-27 PROCEDURE — 45385 COLONOSCOPY W/LESION REMOVAL: CPT

## 2023-11-27 RX ORDER — NALOXONE HYDROCHLORIDE 0.4 MG/ML
0.2 INJECTION, SOLUTION INTRAMUSCULAR; INTRAVENOUS; SUBCUTANEOUS
Status: DISCONTINUED | OUTPATIENT
Start: 2023-11-27 | End: 2023-11-28 | Stop reason: HOSPADM

## 2023-11-27 RX ORDER — LIDOCAINE 40 MG/G
CREAM TOPICAL
Status: DISCONTINUED | OUTPATIENT
Start: 2023-11-27 | End: 2023-11-28 | Stop reason: HOSPADM

## 2023-11-27 RX ORDER — NALOXONE HYDROCHLORIDE 0.4 MG/ML
0.4 INJECTION, SOLUTION INTRAMUSCULAR; INTRAVENOUS; SUBCUTANEOUS
Status: DISCONTINUED | OUTPATIENT
Start: 2023-11-27 | End: 2023-11-28 | Stop reason: HOSPADM

## 2023-11-27 RX ORDER — ONDANSETRON 2 MG/ML
4 INJECTION INTRAMUSCULAR; INTRAVENOUS EVERY 6 HOURS PRN
Status: DISCONTINUED | OUTPATIENT
Start: 2023-11-27 | End: 2023-11-28 | Stop reason: HOSPADM

## 2023-11-27 RX ORDER — ONDANSETRON 2 MG/ML
4 INJECTION INTRAMUSCULAR; INTRAVENOUS
Status: DISCONTINUED | OUTPATIENT
Start: 2023-11-27 | End: 2023-11-28 | Stop reason: HOSPADM

## 2023-11-27 RX ORDER — FLUMAZENIL 0.1 MG/ML
0.2 INJECTION, SOLUTION INTRAVENOUS
Status: DISCONTINUED | OUTPATIENT
Start: 2023-11-27 | End: 2023-11-28 | Stop reason: HOSPADM

## 2023-11-27 RX ORDER — PROCHLORPERAZINE MALEATE 10 MG
10 TABLET ORAL EVERY 6 HOURS PRN
Status: DISCONTINUED | OUTPATIENT
Start: 2023-11-27 | End: 2023-11-28 | Stop reason: HOSPADM

## 2023-11-27 RX ORDER — ONDANSETRON 4 MG/1
4 TABLET, ORALLY DISINTEGRATING ORAL EVERY 6 HOURS PRN
Status: DISCONTINUED | OUTPATIENT
Start: 2023-11-27 | End: 2023-11-28 | Stop reason: HOSPADM

## 2023-11-27 RX ORDER — PROPOFOL 10 MG/ML
INJECTION, EMULSION INTRAVENOUS CONTINUOUS PRN
Status: DISCONTINUED | OUTPATIENT
Start: 2023-11-27 | End: 2023-11-27

## 2023-11-27 RX ORDER — SODIUM CHLORIDE, SODIUM LACTATE, POTASSIUM CHLORIDE, CALCIUM CHLORIDE 600; 310; 30; 20 MG/100ML; MG/100ML; MG/100ML; MG/100ML
INJECTION, SOLUTION INTRAVENOUS CONTINUOUS PRN
Status: DISCONTINUED | OUTPATIENT
Start: 2023-11-27 | End: 2023-11-27

## 2023-11-27 RX ORDER — LIDOCAINE HYDROCHLORIDE 20 MG/ML
INJECTION, SOLUTION INFILTRATION; PERINEURAL PRN
Status: DISCONTINUED | OUTPATIENT
Start: 2023-11-27 | End: 2023-11-27

## 2023-11-27 RX ADMIN — PROPOFOL 150 MCG/KG/MIN: 10 INJECTION, EMULSION INTRAVENOUS at 12:22

## 2023-11-27 RX ADMIN — PROPOFOL 100 MG: 10 INJECTION, EMULSION INTRAVENOUS at 12:24

## 2023-11-27 RX ADMIN — SODIUM CHLORIDE, SODIUM LACTATE, POTASSIUM CHLORIDE, CALCIUM CHLORIDE: 600; 310; 30; 20 INJECTION, SOLUTION INTRAVENOUS at 12:11

## 2023-11-27 RX ADMIN — LIDOCAINE HYDROCHLORIDE 80 MG: 20 INJECTION, SOLUTION INFILTRATION; PERINEURAL at 12:22

## 2023-11-27 NOTE — ANESTHESIA POSTPROCEDURE EVALUATION
Patient: Thien Zarate    Procedure: Procedure(s):  Colonoscopy with CO2 insufflation  COLONOSCOPY, FLEXIBLE, WITH LESION REMOVAL USING SNARE       Anesthesia Type:  MAC    Note:  Disposition: Outpatient   Postop Pain Control: Uneventful            Sign Out: Well controlled pain   PONV: No   Neuro/Psych: Uneventful            Sign Out: Acceptable/Baseline neuro status   Airway/Respiratory: Uneventful            Sign Out: Acceptable/Baseline resp. status   CV/Hemodynamics: Uneventful            Sign Out: Acceptable CV status; No obvious hypovolemia; No obvious fluid overload   Other NRE: NONE   DID A NON-ROUTINE EVENT OCCUR? No           Last vitals:  Vitals Value Taken Time   /82 11/27/23 1330   Temp 97  F (36.1  C) 11/27/23 1314   Pulse 68 11/27/23 1330   Resp 16 11/27/23 1330   SpO2 98 % 11/27/23 1330       Electronically Signed By: Jean-Pierre Solis DO  November 27, 2023  1:56 PM

## 2023-11-27 NOTE — H&P
ENDOSCOPY PRE-SEDATION H&P FOR OUTPATIENT PROCEDURES    Thien Zarate  [id number]  1960    Procedure: colonoscopy    Pre-procedure diagnosis: hx polyps    Past medical history:   Past Medical History:   Diagnosis Date    Arthritis     Gout     Hypertension     Type 2 diabetes mellitus without complication, without long-term current use of insulin (H) 08/08/2023       Past surgical history:   Past Surgical History:   Procedure Laterality Date    AS TOTAL KNEE ARTHROPLASTY Bilateral 08/2019    COLONOSCOPY N/A 8/13/2020    Procedure: Colonoscopy, With Polypectomy And Biopsy;  Surgeon: Alisson Waller DO;  Location: [facility]    COLONOSCOPY WITH CO2 INSUFFLATION N/A 8/13/2020    Procedure: COLONOSCOPY, WITH CO2 INSUFFLATION;  Surgeon: Alisson Waller DO;  Location: [facility] OR    DISCECTOMY LUMBAR POSTERIOR MICROSCOPIC ONE LEVEL Left 3/24/2015    Procedure: DISCECTOMY LUMBAR POSTERIOR MICROSCOPIC ONE LEVEL;  Surgeon: Cricket Stone MD;  Location: [facility] OR    left carpal tunnel release  10/2018    Left MMT arthroscopy  2012    ORTHOPEDIC SURGERY  12/2011    Rt knee    ORTHOPEDIC SURGERY  3/9/2012    Lt knee    [facility] SPINE SURGERY PROCEDURE UNLISTED      and also laminectomy, unsure what level    Rt MMT arthroscopy  2011       Current Outpatient Medications   Medication    amLODIPine (NORVASC) 5 MG tablet    aspirin 81 MG EC tablet    carvedilol (COREG) 25 MG tablet    rosuvastatin (CRESTOR) 10 MG tablet    spironolactone (ALDACTONE) 50 MG tablet    bisacodyl (DULCOLAX) 5 MG EC tablet    polyethylene glycol (GOLYTELY) 236 g suspension     Current Facility-Administered Medications   Medication    lidocaine (LMX4) kit    lidocaine 1 % 0.1-1 mL    ondansetron (ZOFRAN) injection 4 mg    sodium chloride (PF) 0.9% PF flush 3 mL    sodium chloride (PF) 0.9% PF flush 3 mL     Facility-Administered Medications Ordered in Other Encounters   Medication    lactated ringers infusion       Allergies   Allergen Reactions    Penicillins  Unknown     Other reaction(s): *Unknown - Childhood Rxn    Shellfish Allergy        History of Anesthesia/Sedation Problems: no    PHYSICAL EXAMINATION:  Constitutional: aaox3, cooperative, pleasant  Vitals reviewed: BP (!) 158/96   Pulse 69   Temp (!) 96.5  F (35.8  C) (Temporal)   Resp 16   SpO2 97%   Wt:   Wt Readings from Last 2 Encounters:   08/08/23 132.5 kg (292 lb)   04/19/22 128.8 kg (284 lb)      Eyes: Sclera anicteric/injected  Ears/nose/mouth/throat: Normal oropharynx without ulcers or exudate, mucus membranes moist, hearing intact  Neck: supple, thyroid normal size  CV: No edema  Respiratory: Unlabored breathing  Lymph: No submandibular, supraclavicular or inguinal lymphadenopathy  Abd: Nondistended, no masses, nontender  Skin: warm, perfused, no jaundice  Psych: Normal affect  MSK: normal movement on limited exam.    ASA Score: See Provation note    Assessment/Plan:     The patient is an appropriate candidate to receive sedation.    Informed consent was discussed with the patient/family, including the risks, benefits, potential complications and any alternative options associated with sedation.    Patient assessment completed just prior to sedation and while under constant observation by the provider. Condition determined to be adequate for proceeding with sedation.    The specific risks for the procedure were discussed with the patient at the time of informed consent and include but are not limited to perforation which could require surgery, missing significant neoplasm or lesion, hemorrhage and adverse sedative complication.      Marquis Singletary MD

## 2023-11-27 NOTE — ANESTHESIA CARE TRANSFER NOTE
Patient: Thien Zarate    Procedure: Procedure(s):  Colonoscopy with CO2 insufflation  COLONOSCOPY, FLEXIBLE, WITH LESION REMOVAL USING SNARE       Diagnosis: History of colonic polyps [Z86.010]  Diagnosis Additional Information: No value filed.    Anesthesia Type:   MAC     Note:    Oropharynx: oropharynx clear of all foreign objects and spontaneously breathing  Level of Consciousness: drowsy  Oxygen Supplementation: room air    Independent Airway: airway patency satisfactory and stable    Vital Signs Stable: post-procedure vital signs reviewed and stable  Report to RN Given: handoff report given  Patient transferred to: Phase II    Handoff Report: Identifed the Patient, Identified the Reponsible Provider, Reviewed the pertinent medical history, Discussed the surgical course, Reviewed Intra-OP anesthesia mangement and issues during anesthesia, Set expectations for post-procedure period and Allowed opportunity for questions and acknowledgement of understanding      Vitals:  Vitals Value Taken Time   BP     Temp     Pulse     Resp     SpO2         Electronically Signed By: ABBI Lazo CRNA  November 27, 2023  1:10 PM

## 2023-11-29 LAB
PATH REPORT.COMMENTS IMP SPEC: NORMAL
PATH REPORT.COMMENTS IMP SPEC: NORMAL
PATH REPORT.FINAL DX SPEC: NORMAL
PATH REPORT.GROSS SPEC: NORMAL
PATH REPORT.MICROSCOPIC SPEC OTHER STN: NORMAL
PATH REPORT.RELEVANT HX SPEC: NORMAL
PHOTO IMAGE: NORMAL

## 2023-12-03 ENCOUNTER — HEALTH MAINTENANCE LETTER (OUTPATIENT)
Age: 63
End: 2023-12-03

## 2024-04-21 ENCOUNTER — HEALTH MAINTENANCE LETTER (OUTPATIENT)
Age: 64
End: 2024-04-21

## 2024-06-30 ENCOUNTER — HEALTH MAINTENANCE LETTER (OUTPATIENT)
Age: 64
End: 2024-06-30

## 2024-08-10 DIAGNOSIS — E78.5 HYPERLIPIDEMIA LDL GOAL <100: ICD-10-CM

## 2024-08-10 DIAGNOSIS — I10 HYPERTENSION GOAL BP (BLOOD PRESSURE) < 140/90: ICD-10-CM

## 2024-08-12 RX ORDER — ROSUVASTATIN CALCIUM 10 MG/1
10 TABLET, COATED ORAL DAILY
Qty: 90 TABLET | Refills: 0 | Status: SHIPPED | OUTPATIENT
Start: 2024-08-12

## 2024-08-12 RX ORDER — CARVEDILOL 25 MG/1
TABLET ORAL
Qty: 180 TABLET | Refills: 0 | Status: SHIPPED | OUTPATIENT
Start: 2024-08-12

## 2024-08-15 DIAGNOSIS — I10 HYPERTENSION GOAL BP (BLOOD PRESSURE) < 140/90: ICD-10-CM

## 2024-08-15 RX ORDER — SPIRONOLACTONE 50 MG/1
TABLET, FILM COATED ORAL
Qty: 180 TABLET | Refills: 0 | Status: SHIPPED | OUTPATIENT
Start: 2024-08-15

## 2024-09-08 ENCOUNTER — HEALTH MAINTENANCE LETTER (OUTPATIENT)
Age: 64
End: 2024-09-08

## 2024-10-01 DIAGNOSIS — I10 HYPERTENSION GOAL BP (BLOOD PRESSURE) < 140/90: ICD-10-CM

## 2024-10-02 RX ORDER — AMLODIPINE BESYLATE 5 MG/1
TABLET ORAL
Qty: 30 TABLET | Refills: 0 | Status: SHIPPED | OUTPATIENT
Start: 2024-10-02 | End: 2024-10-31

## 2024-10-31 DIAGNOSIS — I10 HYPERTENSION GOAL BP (BLOOD PRESSURE) < 140/90: ICD-10-CM

## 2024-10-31 RX ORDER — AMLODIPINE BESYLATE 5 MG/1
TABLET ORAL
Qty: 30 TABLET | Refills: 0 | Status: SHIPPED | OUTPATIENT
Start: 2024-10-31 | End: 2024-11-05

## 2024-10-31 NOTE — TELEPHONE ENCOUNTER
Medication Question or Refill    Contacts       Contact Date/Time Type Contact Phone/Fax    10/31/2024 12:07 PM CDT Phone (Incoming) Thien Zarate (Self) [phone] (M)            What medication are you calling about (include dose and sig)?: amLODIPine (NORVASC) 5 MG tablet     Preferred Pharmacy:   [address] [address]  Phone: [phone] Fax: [phone]    Controlled Substance Agreement on file:   CSA -- Patient Level:    CSA: None found at the patient level.       Who prescribed the medication?: Dr. Solares    Do you need a refill? Yes    When did you use the medication last? today    Patient offered an appointment? No    Do you have any questions or concerns?  Yes: Patient has 2 pills left.  Is scheduled to see Dr. Solares on 11-5-24, but will need refill before appointment.    Could we send this information to you in [facility] or would you prefer to receive a phone call?:   Patient would prefer a phone call   Okay to leave a detailed message?: Yes at Cell number on file:    Telephone Information:   Mobile [phone]

## 2024-11-05 ENCOUNTER — OFFICE VISIT (OUTPATIENT)
Dept: FAMILY MEDICINE | Facility: CLINIC | Age: 64
End: 2024-11-05
Payer: COMMERCIAL

## 2024-11-05 VITALS
TEMPERATURE: 98 F | HEIGHT: 74 IN | HEART RATE: 67 BPM | DIASTOLIC BLOOD PRESSURE: 78 MMHG | BODY MASS INDEX: 36.83 KG/M2 | RESPIRATION RATE: 20 BRPM | SYSTOLIC BLOOD PRESSURE: 121 MMHG | OXYGEN SATURATION: 96 % | WEIGHT: 287 LBS

## 2024-11-05 DIAGNOSIS — Z12.5 SCREENING FOR PROSTATE CANCER: ICD-10-CM

## 2024-11-05 DIAGNOSIS — E11.9 TYPE 2 DIABETES MELLITUS WITHOUT COMPLICATION, WITHOUT LONG-TERM CURRENT USE OF INSULIN (H): ICD-10-CM

## 2024-11-05 DIAGNOSIS — I10 HYPERTENSION GOAL BP (BLOOD PRESSURE) < 140/90: ICD-10-CM

## 2024-11-05 DIAGNOSIS — E78.5 HYPERLIPIDEMIA LDL GOAL <100: ICD-10-CM

## 2024-11-05 DIAGNOSIS — Z00.00 ROUTINE GENERAL MEDICAL EXAMINATION AT A HEALTH CARE FACILITY: Primary | ICD-10-CM

## 2024-11-05 DIAGNOSIS — E66.01 MORBID OBESITY (H): ICD-10-CM

## 2024-11-05 DIAGNOSIS — Z23 NEED FOR PNEUMOCOCCAL 20-VALENT CONJUGATE VACCINATION: ICD-10-CM

## 2024-11-05 LAB
ANION GAP SERPL CALCULATED.3IONS-SCNC: 15 MMOL/L (ref 7–15)
BUN SERPL-MCNC: 23.5 MG/DL (ref 8–23)
CALCIUM SERPL-MCNC: 9.8 MG/DL (ref 8.8–10.4)
CHLORIDE SERPL-SCNC: 103 MMOL/L (ref 98–107)
CHOLEST SERPL-MCNC: 183 MG/DL
CREAT SERPL-MCNC: 1.13 MG/DL (ref 0.67–1.17)
CREAT UR-MCNC: 306 MG/DL
EGFRCR SERPLBLD CKD-EPI 2021: 73 ML/MIN/1.73M2
EST. AVERAGE GLUCOSE BLD GHB EST-MCNC: 131 MG/DL
FASTING STATUS PATIENT QL REPORTED: YES
FASTING STATUS PATIENT QL REPORTED: YES
GLUCOSE SERPL-MCNC: 160 MG/DL (ref 70–99)
HBA1C MFR BLD: 6.2 % (ref 0–5.6)
HCO3 SERPL-SCNC: 20 MMOL/L (ref 22–29)
HDLC SERPL-MCNC: 55 MG/DL
LDLC SERPL CALC-MCNC: 90 MG/DL
MICROALBUMIN UR-MCNC: <12 MG/L
MICROALBUMIN/CREAT UR: NORMAL MG/G{CREAT}
NONHDLC SERPL-MCNC: 128 MG/DL
POTASSIUM SERPL-SCNC: 4.7 MMOL/L (ref 3.4–5.3)
PSA SERPL DL<=0.01 NG/ML-MCNC: 1.32 NG/ML (ref 0–4.5)
SODIUM SERPL-SCNC: 138 MMOL/L (ref 135–145)
TRIGL SERPL-MCNC: 191 MG/DL

## 2024-11-05 PROCEDURE — 90471 IMMUNIZATION ADMIN: CPT | Performed by: FAMILY MEDICINE

## 2024-11-05 PROCEDURE — 99213 OFFICE O/P EST LOW 20 MIN: CPT | Mod: 25 | Performed by: FAMILY MEDICINE

## 2024-11-05 PROCEDURE — G0103 PSA SCREENING: HCPCS | Performed by: FAMILY MEDICINE

## 2024-11-05 PROCEDURE — 82570 ASSAY OF URINE CREATININE: CPT | Performed by: FAMILY MEDICINE

## 2024-11-05 PROCEDURE — 80048 BASIC METABOLIC PNL TOTAL CA: CPT | Performed by: FAMILY MEDICINE

## 2024-11-05 PROCEDURE — 99396 PREV VISIT EST AGE 40-64: CPT | Mod: 25 | Performed by: FAMILY MEDICINE

## 2024-11-05 PROCEDURE — 90677 PCV20 VACCINE IM: CPT | Performed by: FAMILY MEDICINE

## 2024-11-05 PROCEDURE — 82043 UR ALBUMIN QUANTITATIVE: CPT | Performed by: FAMILY MEDICINE

## 2024-11-05 PROCEDURE — 80061 LIPID PANEL: CPT | Performed by: FAMILY MEDICINE

## 2024-11-05 PROCEDURE — 36415 COLL VENOUS BLD VENIPUNCTURE: CPT | Performed by: FAMILY MEDICINE

## 2024-11-05 PROCEDURE — 83036 HEMOGLOBIN GLYCOSYLATED A1C: CPT | Performed by: FAMILY MEDICINE

## 2024-11-05 RX ORDER — CARVEDILOL 25 MG/1
TABLET ORAL
Qty: 180 TABLET | Refills: 3 | Status: SHIPPED | OUTPATIENT
Start: 2024-11-05

## 2024-11-05 RX ORDER — ASPIRIN 81 MG/1
81 TABLET ORAL DAILY
COMMUNITY
Start: 2024-11-05

## 2024-11-05 RX ORDER — SPIRONOLACTONE 50 MG/1
TABLET, FILM COATED ORAL
Qty: 180 TABLET | Refills: 3 | Status: SHIPPED | OUTPATIENT
Start: 2024-11-05

## 2024-11-05 RX ORDER — AMLODIPINE BESYLATE 5 MG/1
TABLET ORAL
Qty: 90 TABLET | Refills: 3 | Status: SHIPPED | OUTPATIENT
Start: 2024-11-05

## 2024-11-05 RX ORDER — ROSUVASTATIN CALCIUM 10 MG/1
10 TABLET, COATED ORAL DAILY
Qty: 90 TABLET | Refills: 3 | Status: SHIPPED | OUTPATIENT
Start: 2024-11-05

## 2024-11-05 SDOH — HEALTH STABILITY: PHYSICAL HEALTH: ON AVERAGE, HOW MANY DAYS PER WEEK DO YOU ENGAGE IN MODERATE TO STRENUOUS EXERCISE (LIKE A BRISK WALK)?: 5 DAYS

## 2024-11-05 ASSESSMENT — PAIN SCALES - GENERAL: PAINLEVEL_OUTOF10: NO PAIN (0)

## 2024-11-05 ASSESSMENT — SOCIAL DETERMINANTS OF HEALTH (SDOH): HOW OFTEN DO YOU GET TOGETHER WITH FRIENDS OR RELATIVES?: PATIENT DECLINED

## 2024-11-05 NOTE — RESULT ENCOUNTER NOTE
Andrew,  These laboratory tests confirm that you have type 2 diabetes, but it is under reasonable control, and the rest of your laboratory tests generally look fine and/or similar to the past.  It would be reasonable to manage your diabetes with diet and exercise treatment at this time, trying to achieve weight loss, so please follow through with the diabetes educator appointment when they reach out to you to schedule that.  Please continue your same medications in the meantime.    Amaury Solares MD

## 2024-11-05 NOTE — PROGRESS NOTES
"Preventive Care Visit  [facility]  Amaury Solares MD, Family Medicine  Nov 5, 2024      Assessment & Plan       ICD-10-CM    1. Routine general medical examination at a health care facility  Z00.00       2. Type 2 diabetes mellitus without complication, without long-term current use of insulin (H)  E11.9 Albumin Random Urine Quantitative with Creat Ratio     HEMOGLOBIN A1C     Lipid panel reflex to direct LDL Fasting     OPTOMETRY REFERRAL     FOOT EXAM     Adult Diabetes Education  Referral      3. Hypertension goal BP (blood pressure) < 140/90  I10 BASIC METABOLIC PANEL     amLODIPine (NORVASC) 5 MG tablet     carvedilol (COREG) 25 MG tablet     spironolactone (ALDACTONE) 50 MG tablet      4. Hyperlipidemia LDL goal <100  E78.5 rosuvastatin (CRESTOR) 10 MG tablet      5. Morbid obesity (H)  E66.01       6. Need for pneumococcal 20-valent conjugate vaccination  Z23       7. Screening for prostate cancer  Z12.5 Prostate Specific Antigen Screen        Blood pressure and other vital signs look fine  We discussed the above items  Will check fasting lab work today as above  We discussed his type 2 diabetes diagnosis in some detail and I will refer him to diabetes education for further instruction on managing this with diet and exercise treatment and also to help him with weight loss  He was advised to start the low-dose aspirin daily  He plans to get a COVID-vaccine and flu shot from work, but we will give him a Prevnar 20 vaccine today  Plan a tentative recheck in 6 to 12 months            BMI  Estimated body mass index is 37.35 kg/m  as calculated from the following:    Height as of this encounter: 1.867 m (6' 1.5\").    Weight as of this encounter: 130.2 kg (287 lb).       Counseling  Appropriate preventive services were addressed with this patient via screening, questionnaire, or discussion as appropriate for fall prevention, nutrition, physical activity, Tobacco-use cessation, social " engagement, weight loss and cognition.  Checklist reviewing preventive services available has been given to the patient.  Reviewed patient's diet, addressing concerns and/or questions.   The patient was instructed to see the dentist every 6 months.   The patient reports drinking more than 3 alcoholic drinks per day and/or more than 7 drhnks per week. The patient was counseled and given information about possible harmful effects of excessive alcohol intake.        Vale Neumann is a 64 year old, presenting for the following:  Physical    Weight issue  Sweating too much      11/5/2024     8:40 AM   Additional Questions   Roomed by cam   Accompanied by none         11/5/2024     8:40 AM   Patient Reported Additional Medications   Patient reports taking the following new medications none          HPI    He was diagnosed with diabetes mellitus type 2 in August of last year.  He does not recall ever getting the lab results informing him of that.  He never did follow-up with diabetes education.  He never did start the low-dose aspirin.  He remains on baseline medications as below.  He has struggled with his weight for years.  He has lost small amounts of weight periodically, but then gains it back.  He sweats quite a bit.  He does not get much physical activity outside of work.  He does walk about 4 to 5 miles per day or so in his job.      Health Care Directive  Patient does not have a Health Care Directive: Discussed advance care planning with patient; information given to patient to review.      11/5/2024   General Health   How would you rate your overall physical health? Good   Feel stress (tense, anxious, or unable to sleep) Not at all            11/5/2024   Nutrition   Three or more servings of calcium each day? (!) I DON'T KNOW   Diet: Other   If other, please elaborate: no diet   How many servings of fruit and vegetables per day? (!) 2-3   How many sweetened beverages each day? 0-1            11/5/2024    Exercise   Days per week of moderate/strenous exercise 5 days            11/5/2024   Social Factors   Frequency of gathering with friends or relatives Patient declined   Worry food won't last until get money to buy more No   Food not last or not have enough money for food? No   Do you have housing? (Housing is defined as stable permanent housing and does not include staying ouside in a car, in a tent, in an abandoned building, in an overnight shelter, or couch-surfing.) Yes   Are you worried about losing your housing? No   Lack of transportation? No   Unable to get utilities (heat,electricity)? No            11/5/2024   Fall Risk   Fallen 2 or more times in the past year? No    Trouble with walking or balance? No        Patient-reported          11/5/2024   Dental   Dentist two times every year? (!) NO            11/5/2024   TB Screening   Were you born outside of the [place]? No            Today's PHQ-2 Score:       11/5/2024     8:30 AM   PHQ-2 ( 1999 [employer])   Q1: Little interest or pleasure in doing things 0    Q2: Feeling down, depressed or hopeless 0    PHQ-2 Score 0    Q1: Little interest or pleasure in doing things Not at all   Q2: Feeling down, depressed or hopeless Not at all   PHQ-2 Score 0       Patient-reported           11/5/2024   Substance Use   Alcohol more than 3/day or more than 7/wk Yes   How often do you have a drink containing alcohol 2 to 3 times a week   How many alcohol drinks on typical day 3 or 4   How often do you have 5+ drinks at one occasion Less than monthly   Audit 2/3 Score 2   How often not able to stop drinking once started Never   How often failed to do what normally expected Never   How often needed first drink in am after a heavy drinking session Never   How often feeling of guilt or remorse after drinking Never   How often unable to remember what happened the night before Never   Have you or someone else been injured because of your drinking No   Has anyone been concerned or  suggested you cut down on drinking No   TOTAL SCORE - AUDIT 5   Do you use any other substances recreationally? No        Social History     Tobacco Use    Smoking status: Former     Types: Cigars     Quit date: 6/3/1979     Years since quittin.4     Passive exposure: Past    Smokeless tobacco: Never   Vaping Use    Vaping status: Never Used   Substance Use Topics    Alcohol use: Yes    Drug use: No     Comment: Hx of...           2024   STI Screening   New sexual partner(s) since last STI/HIV test? (!) DECLINE      Last PSA:   Prostate Specific Antigen Screen   Date Value Ref Range Status   2022 0.81 0.00 - 4.00 ug/L Final     ASCVD Risk   The 10-year ASCVD risk score (Vamsi HONG, et al., 2019) is: 18%    Values used to calculate the score:      Age: 64 years      Sex: Male      Is Non- : No      Diabetic: Yes      Tobacco smoker: No      Systolic Blood Pressure: 121 mmHg      Is BP treated: Yes      HDL Cholesterol: 48 mg/dL      Total Cholesterol: 155 mg/dL           Reviewed and updated as needed this visit by Provider                    Patient Active Problem List   Diagnosis    Hypertension goal BP (blood pressure) < 140/90    Hyperlipidemia LDL goal <100    History of gout    History of colonic polyps    History of total knee arthroplasty, bilateral    Morbid obesity (H)    Type 2 diabetes mellitus without complication, without long-term current use of insulin (H)     Past Surgical History:   Procedure Laterality Date    AS TOTAL KNEE ARTHROPLASTY Bilateral 2019    COLONOSCOPY N/A 2020    Procedure: Colonoscopy, With Polypectomy And Biopsy;  Surgeon: Alisson Waller DO;  Location: [facility] OR    COLONOSCOPY N/A 2023    Procedure: COLONOSCOPY, FLEXIBLE, WITH LESION REMOVAL USING SNARE;  Surgeon: Marquis Singletary MD;  Location: [facility] OR    COLONOSCOPY WITH CO2 INSUFFLATION N/A 2020    Procedure: COLONOSCOPY, WITH CO2 INSUFFLATION;  Surgeon:  Alisson Waller, ;  Location: [facility] OR    COLONOSCOPY WITH CO2 INSUFFLATION N/A 2023    Procedure: Colonoscopy with CO2 insufflation;  Surgeon: Marquis Singletary MD;  Location: [facility] OR    DISCECTOMY LUMBAR POSTERIOR MICROSCOPIC ONE LEVEL Left 3/24/2015    Procedure: DISCECTOMY LUMBAR POSTERIOR MICROSCOPIC ONE LEVEL;  Surgeon: Cricket Stone MD;  Location: [facility] OR    left carpal tunnel release  10/2018    Left MMT arthroscopy  2012    ORTHOPEDIC SURGERY  2011    Rt knee    ORTHOPEDIC SURGERY  3/9/2012    Lt knee    TN SPINE SURGERY PROCEDURE UNLISTED      and also laminectomy, unsure what level    Rt MMT arthroscopy         Social History     Tobacco Use    Smoking status: Former     Types: Cigars     Quit date: 6/3/1979     Years since quittin.4     Passive exposure: Past    Smokeless tobacco: Never   Substance Use Topics    Alcohol use: Yes     Family History   Problem Relation Age of Onset    Cardiovascular Father     Heart Disease Sister          Current Outpatient Medications   Medication Sig Dispense Refill    amLODIPine (NORVASC) 5 MG tablet Take 1 tablet (5 mg) by mouth once daily. 90 tablet 3    carvedilol (COREG) 25 MG tablet Take 1 tablet (25 mg) by mouth twice a day with meals. 180 tablet 3    rosuvastatin (CRESTOR) 10 MG tablet Take 1 tablet (10 mg) by mouth daily. 90 tablet 3    spironolactone (ALDACTONE) 50 MG tablet Take 1 tablet (50 mg) by mouth twice a day. 180 tablet 3    aspirin 81 MG EC tablet Take 1 tablet (81 mg) by mouth daily (Patient not taking: Reported on 2024)       Allergies   Allergen Reactions    Penicillins Unknown     Other reaction(s): *Unknown - Childhood Rxn    Shellfish Allergy          Review of Systems  Constitutional, HEENT, cardiovascular, pulmonary, gi and gu systems are negative, except as otherwise noted.     Objective    Exam  /78 (BP Location: Right arm, Patient Position: Chair, Cuff Size: Adult Large)   Pulse 67   Temp 98  F (36.7  " C) (Temporal)   Resp 20   Ht 1.867 m (6' 1.5\")   Wt 130.2 kg (287 lb)   SpO2 96%   BMI 37.35 kg/m     Estimated body mass index is 37.35 kg/m  as calculated from the following:    Height as of this encounter: 1.867 m (6' 1.5\").    Weight as of this encounter: 130.2 kg (287 lb).    Physical Exam  GENERAL: alert and no distress  EYES: Eyes grossly normal to inspection, PERRL and conjunctivae and sclerae normal  HENT: Grossly normal  NECK: no adenopathy, no asymmetry, masses, or scars  RESP: lungs clear to auscultation - no rales, rhonchi or wheezes  CV: regular rate and rhythm, normal S1 S2, no S3 or S4, no murmur, click or rub, no peripheral edema  ABDOMEN: soft, nontender, no hepatosplenomegaly, no masses.  MS: no gross musculoskeletal defects noted, no edema.  He has had bilateral TKAs  SKIN: no suspicious lesions or rashes.  He has numerous benign-appearing nevi on his chest and back.  He has one slightly raised skin lesion on his right upper chest/shoulder region that bothers him and he will may want to have that removed at some point.  NEURO: Normal strength and tone, sensation in feet is intact to light touch, mentation intact and speech normal  PSYCH: mentation appears normal, affect normal/bright    Laboratory tests from the summer last year were reviewed.    Signed Electronically by: Amaury Solares MD    "

## 2024-11-05 NOTE — NURSING NOTE
Prior to immunization administration, verified patients identity using patient s name and date of birth. Please see Immunization Activity for additional information.     Screening Questionnaire for Adult Immunization    Are you sick today?   No   Do you have allergies to medications, food, a vaccine component or latex?   Yes   Have you ever had a serious reaction after receiving a vaccination?   No   Do you have a long-term health problem with heart, lung, kidney, or metabolic disease (e.g., diabetes), asthma, a blood disorder, no spleen, complement component deficiency, a cochlear implant, or a spinal fluid leak?  Are you on long-term aspirin therapy?   Yes   Do you have cancer, leukemia, HIV/AIDS, or any other immune system problem?   No   Do you have a parent, brother, or sister with an immune system problem?   No   In the past 3 months, have you taken medications that affect  your immune system, such as prednisone, other steroids, or anticancer drugs; drugs for the treatment of rheumatoid arthritis, Crohn s disease, or psoriasis; or have you had radiation treatments?   No   Have you had a seizure, or a brain or other nervous system problem?   No   During the past year, have you received a transfusion of blood or blood    products, or been given immune (gamma) globulin or antiviral drug?   No   For women: Are you pregnant or is there a chance you could become       pregnant during the next month?   No   Have you received any vaccinations in the past 4 weeks?   No     Immunization questionnaire was positive for at least one answer.  Notified Dr. Solares.      Patient instructed to remain in clinic for 15 minutes afterwards, and to report any adverse reactions.     Screening performed by Aster Coronado CMA on 11/5/2024 at 9:26 AM.

## 2024-11-14 ENCOUNTER — OFFICE VISIT (OUTPATIENT)
Dept: FAMILY MEDICINE | Facility: CLINIC | Age: 64
End: 2024-11-14
Payer: COMMERCIAL

## 2024-11-14 VITALS
TEMPERATURE: 98 F | RESPIRATION RATE: 20 BRPM | BODY MASS INDEX: 36.96 KG/M2 | DIASTOLIC BLOOD PRESSURE: 82 MMHG | WEIGHT: 288 LBS | HEIGHT: 74 IN | SYSTOLIC BLOOD PRESSURE: 123 MMHG | HEART RATE: 54 BPM | OXYGEN SATURATION: 97 %

## 2024-11-14 DIAGNOSIS — L72.0 EPIDERMAL INCLUSION CYST: ICD-10-CM

## 2024-11-14 DIAGNOSIS — L73.8 SEBACEOUS GLAND HYPERPLASIA OF FACE: ICD-10-CM

## 2024-11-14 DIAGNOSIS — L98.9 SKIN LESION: Primary | ICD-10-CM

## 2024-11-14 DIAGNOSIS — D22.9 MULTIPLE BENIGN NEVI: ICD-10-CM

## 2024-11-14 PROCEDURE — 11300 SHAVE SKIN LESION 0.5 CM/<: CPT | Performed by: FAMILY MEDICINE

## 2024-11-14 PROCEDURE — 99213 OFFICE O/P EST LOW 20 MIN: CPT | Mod: 25 | Performed by: FAMILY MEDICINE

## 2024-11-14 ASSESSMENT — PAIN SCALES - GENERAL: PAINLEVEL_OUTOF10: NO PAIN (0)

## 2024-11-14 NOTE — PROGRESS NOTES
"  Assessment & Plan       ICD-10-CM    1. Skin lesion  L98.9 SHAV SKIN LESION TRUNK/ARM/LEG <=0.5 CM     Surgical Pathology Exam      2. Sebaceous gland hyperplasia of face  L73.8       3. Epidermal inclusion cyst  L72.0       4. Multiple benign nevi  D22.9         Routine wound care for the skin lesion removal site  I discussed sebaceous gland hyperplasia, epidermal inclusion cysts, and his benign nevi and reassured him about those  No specific treatment needed  I suggested he could use a salicylic acid cleanser on his face and that might to help prevent some \"blocked glands\" from occurring in the future  Recheck prn        Vale Neumann is a 64 year old, presenting for the following health issues:  Derm Problem      11/14/2024     1:33 PM   Additional Questions   Roomed by cam         11/14/2024     1:33 PM   Patient Reported Additional Medications   Patient reports taking the following new medications none     History of Present Illness       Reason for visit:  Ketosis removal  Symptom onset:  More than a month  Symptoms include:  None  Symptom intensity:  Mild  Symptom progression:  Staying the same  Had these symptoms before:  No  What makes it worse:  Nope  What makes it better:  Not working   He is taking medications regularly.     He returns today requesting removal of a raised skin growth on his anterior right shoulder.  It has been present for years, but it is raised and somewhat bothersome.  He would like it removed.  He also wants me to take a closer look at some spots on his face and on his trunk.  He had some questions about other skin growths today as well.    Patient Active Problem List   Diagnosis    Hypertension goal BP (blood pressure) < 140/90    Hyperlipidemia LDL goal <100    History of gout    History of colonic polyps    History of total knee arthroplasty, bilateral    Morbid obesity (H)    Type 2 diabetes mellitus without complication, without long-term current use of insulin (H) " "    Current Outpatient Medications   Medication Sig Dispense Refill    amLODIPine (NORVASC) 5 MG tablet Take 1 tablet (5 mg) by mouth once daily. 90 tablet 3    carvedilol (COREG) 25 MG tablet Take 1 tablet (25 mg) by mouth twice a day with meals. 180 tablet 3    rosuvastatin (CRESTOR) 10 MG tablet Take 1 tablet (10 mg) by mouth daily. 90 tablet 3    spironolactone (ALDACTONE) 50 MG tablet Take 1 tablet (50 mg) by mouth twice a day. 180 tablet 3    aspirin 81 MG EC tablet Take 1 tablet (81 mg) by mouth daily. (Patient not taking: Reported on 11/14/2024)       No current facility-administered medications for this visit.           Review of Systems  Noncontributory except as above.      Objective    /82 (BP Location: Left arm, Patient Position: Sitting, Cuff Size: Adult Large)   Pulse 54   Temp 98  F (36.7  C) (Temporal)   Resp 20   Ht 1.867 m (6' 1.5\")   Wt 130.6 kg (288 lb)   SpO2 97%   BMI 37.48 kg/m    Body mass index is 37.48 kg/m .  Physical Exam   GENERAL: alert and no distress  SKIN: He has a slightly raised pink-colored sharply demarcated roughly half centimeter diameter fleshy skin growth on his right anterior shoulder.  He has numerous other benign appearing nevi on his chest and abdomen and trunk which were noted.  He has a few areas of sebaceous gland hyperplasia on his face and a couple small epidermal inclusion cysts on his face as well that he had questions about.    After discussion with the patient, the right anterior shoulder skin lesion was cleansed, anesthetized with 1% lidocaine with epinephrine, and then excised with a scalpel.  The specimen was sent into pathology.  Hemostasis was obtained with the Hyfrecator and then it was bandaged.          Signed Electronically by: Amaury Solares MD    "

## 2024-12-05 ENCOUNTER — OFFICE VISIT (OUTPATIENT)
Dept: OPHTHALMOLOGY | Facility: CLINIC | Age: 64
End: 2024-12-05
Attending: FAMILY MEDICINE
Payer: COMMERCIAL

## 2024-12-05 DIAGNOSIS — E11.9 TYPE 2 DIABETES MELLITUS WITHOUT COMPLICATION, WITHOUT LONG-TERM CURRENT USE OF INSULIN (H): Primary | ICD-10-CM

## 2024-12-05 DIAGNOSIS — H52.4 PRESBYOPIA: ICD-10-CM

## 2024-12-05 DIAGNOSIS — Z01.00 EXAMINATION OF EYES AND VISION: ICD-10-CM

## 2024-12-05 PROCEDURE — 92004 COMPRE OPH EXAM NEW PT 1/>: CPT | Performed by: OPHTHALMOLOGY

## 2024-12-05 PROCEDURE — 92015 DETERMINE REFRACTIVE STATE: CPT | Performed by: OPHTHALMOLOGY

## 2024-12-05 ASSESSMENT — REFRACTION_MANIFEST
OD_AXIS: 065
OS_AXIS: 101
OS_CYLINDER: +0.50
OD_CYLINDER: +1.00
OD_SPHERE: PLANO
OS_SPHERE: +0.50
OD_CYLINDER: +1.25
METHOD_AUTOREFRACTION: 1
OS_SPHERE: PLANO
OD_AXIS: 070
OS_ADD: +2.50
OD_ADD: +2.50
OS_AXIS: 101
OS_CYLINDER: +0.25
OD_SPHERE: -0.25

## 2024-12-05 ASSESSMENT — VISUAL ACUITY
OD_SC+: -2
OD_SC: 20/25
OS_SC: 20/30
OS_SC+: -1
METHOD: SNELLEN - LINEAR

## 2024-12-05 ASSESSMENT — EXTERNAL EXAM - LEFT EYE: OS_EXAM: NORMAL

## 2024-12-05 ASSESSMENT — EXTERNAL EXAM - RIGHT EYE: OD_EXAM: NORMAL

## 2024-12-05 ASSESSMENT — CUP TO DISC RATIO
OS_RATIO: 0.3
OD_RATIO: 0.3

## 2024-12-05 ASSESSMENT — CONF VISUAL FIELD
OS_SUPERIOR_NASAL_RESTRICTION: 0
OD_NORMAL: 1
OS_SUPERIOR_TEMPORAL_RESTRICTION: 0
OD_SUPERIOR_TEMPORAL_RESTRICTION: 0
OS_NORMAL: 1
OS_INFERIOR_TEMPORAL_RESTRICTION: 0
OD_INFERIOR_NASAL_RESTRICTION: 0
OD_SUPERIOR_NASAL_RESTRICTION: 0
METHOD: COUNTING FINGERS
OS_INFERIOR_NASAL_RESTRICTION: 0
OD_INFERIOR_TEMPORAL_RESTRICTION: 0

## 2024-12-05 ASSESSMENT — TONOMETRY
OD_IOP_MMHG: 13
OS_IOP_MMHG: 14
IOP_METHOD: APPLANATION

## 2024-12-05 NOTE — PROGRESS NOTES
" Current Eye Medications:  None     Subjective:  Patient is presents today for a diabetic eye health exam. He wears over the counter readers +1.25 when needed for small print. He does not wear glasses for distance. His last eye exam was about 6 years ago and he has not noticed any changes in his vision since. He has a history of floaters but nothing bothersome. No flashing lights. No ocular pain or discomfort. Patient does not wear contacts, no history of eye surgeries. No other ocular concerns today.     Lab Results   Component Value Date    A1C 6.2 11/05/2024    A1C 6.0 08/08/2023    A1C 5.4 03/30/2022    A1C 5.4 06/16/2021    A1C 5.6 06/03/2020    A1C 5.3 03/06/2012         Objective:  See Ophthalmology Exam.       Assessment:  Baseline eye exam in patient with diabetes.  No diabetic retinopathy.      ICD-10-CM    1. Type 2 diabetes mellitus without complication, without long-term current use of insulin (H)  E11.9 OPTOMETRY REFERRAL      2. Examination of eyes and vision  Z01.00       3. Presbyopia  H52.4            Plan:  Glasses prescription given - optional  Could use up to a +2.50 OTC half glasses.  May use artificial tears up to four times a day (like Refresh Optive, Systane Balance, TheraTears, or generic artificial tears are ok. Avoid \"get the red out\" drops).  Possible posterior vitreous detachment (sudden onset large floater and/or flashing lights) both eyes discussed.     Call in August 2025 for an appointment in December 2025 for Complete Exam    Dr. Robb [phone]       "

## 2024-12-05 NOTE — Clinical Note
12/5/2024      Thien Zarate  [address] [facility] [address]      Dear Colleague,    Thank you for referring your patient, Thien Zarate, to the [facility]. Please see a copy of my visit note below.    No notes on file    Again, thank you for allowing me to participate in the care of your patient.        Sincerely,        Kevin Robb MD

## 2024-12-05 NOTE — PATIENT INSTRUCTIONS
"Glasses prescription given - optional  Could use up to a +2.50 OTC half glasses.  May use artificial tears up to four times a day (like Refresh Optive, Systane Balance, TheraTears, or generic artificial tears are ok. Avoid \"get the red out\" drops).  Possible posterior vitreous detachment (sudden onset large floater and/or flashing lights) both eyes discussed.     Call in August 2025 for an appointment in December 2025 for Complete Exam    Dr. Robb [phone]      Patient Education   Diabetes weakens the blood vessels all over the body, including the eyes. Damage to the blood vessels in the eyes can cause swelling or bleeding into part of the eye (called the retina). This is called diabetic retinopathy (MIRZA-tin--pu-thee). If not treated, this disease can cause vision loss or blindness.   Symptoms may include blurred or distorted vision, but many people have no symptoms. It's important to see your eye doctor regularly to check for problems.   Early treatment and good control can help protect your vision. Here are the things you can do to help prevent vision loss:      1. Keep your blood sugar levels under tight control.      2. Bring high blood pressure under control.      3. No smoking.      4. Have yearly dilated eye exams.       "

## (undated) DEVICE — KIT ENDO FIRST STEP DISINFECTANT 200ML W/POUCH EP-4

## (undated) DEVICE — PREP CHLORAPREP 26ML TINTED ORANGE  260815

## (undated) DEVICE — PAD CHUX UNDERPAD 23X24" 7136

## (undated) RX ORDER — SIMETHICONE 40MG/0.6ML
SUSPENSION, DROPS(FINAL DOSAGE FORM)(ML) ORAL
Status: DISPENSED
Start: 2020-08-13

## (undated) RX ORDER — FENTANYL CITRATE 50 UG/ML
INJECTION, SOLUTION INTRAMUSCULAR; INTRAVENOUS
Status: DISPENSED
Start: 2020-08-13

## (undated) RX ORDER — NALOXONE HYDROCHLORIDE 0.4 MG/ML
INJECTION, SOLUTION INTRAMUSCULAR; INTRAVENOUS; SUBCUTANEOUS
Status: DISPENSED
Start: 2020-08-13

## (undated) RX ORDER — ATROPINE SULFATE 0.4 MG/ML
AMPUL (ML) INJECTION
Status: DISPENSED
Start: 2020-08-13

## (undated) RX ORDER — PROPOFOL 10 MG/ML
INJECTION, EMULSION INTRAVENOUS
Status: DISPENSED
Start: 2023-11-27